# Patient Record
Sex: FEMALE | Race: WHITE | URBAN - METROPOLITAN AREA
[De-identification: names, ages, dates, MRNs, and addresses within clinical notes are randomized per-mention and may not be internally consistent; named-entity substitution may affect disease eponyms.]

---

## 2018-01-01 ENCOUNTER — OFFICE VISIT (OUTPATIENT)
Dept: ONCOLOGY | Facility: CLINIC | Age: 63
End: 2018-01-01
Attending: SURGERY
Payer: COMMERCIAL

## 2018-01-01 ENCOUNTER — APPOINTMENT (OUTPATIENT)
Dept: LAB | Facility: CLINIC | Age: 63
End: 2018-01-01
Attending: INTERNAL MEDICINE
Payer: COMMERCIAL

## 2018-01-01 ENCOUNTER — MYC MEDICAL ADVICE (OUTPATIENT)
Dept: ONCOLOGY | Facility: CLINIC | Age: 63
End: 2018-01-01

## 2018-01-01 ENCOUNTER — INFUSION THERAPY VISIT (OUTPATIENT)
Dept: ONCOLOGY | Facility: CLINIC | Age: 63
End: 2018-01-01
Attending: INTERNAL MEDICINE
Payer: COMMERCIAL

## 2018-01-01 ENCOUNTER — ONCOLOGY VISIT (OUTPATIENT)
Dept: ONCOLOGY | Facility: CLINIC | Age: 63
End: 2018-01-01
Attending: SURGERY
Payer: COMMERCIAL

## 2018-01-01 ENCOUNTER — DOCUMENTATION ONLY (OUTPATIENT)
Dept: GASTROENTEROLOGY | Facility: OUTPATIENT CENTER | Age: 63
End: 2018-01-01
Payer: COMMERCIAL

## 2018-01-01 ENCOUNTER — RADIANT APPOINTMENT (OUTPATIENT)
Dept: CT IMAGING | Facility: CLINIC | Age: 63
End: 2018-01-01
Attending: INTERNAL MEDICINE
Payer: COMMERCIAL

## 2018-01-01 ENCOUNTER — TELEPHONE (OUTPATIENT)
Dept: GASTROENTEROLOGY | Facility: OUTPATIENT CENTER | Age: 63
End: 2018-01-01

## 2018-01-01 ENCOUNTER — HOSPITAL ENCOUNTER (OUTPATIENT)
Dept: MRI IMAGING | Facility: CLINIC | Age: 63
Discharge: HOME | End: 2018-04-26
Attending: INTERNAL MEDICINE | Admitting: INTERNAL MEDICINE
Payer: COMMERCIAL

## 2018-01-01 ENCOUNTER — TELEPHONE (OUTPATIENT)
Dept: SURGERY | Facility: CLINIC | Age: 63
End: 2018-01-01

## 2018-01-01 ENCOUNTER — TELEPHONE (OUTPATIENT)
Dept: ONCOLOGY | Facility: CLINIC | Age: 63
End: 2018-01-01

## 2018-01-01 ENCOUNTER — ONCOLOGY VISIT (OUTPATIENT)
Dept: ONCOLOGY | Facility: CLINIC | Age: 63
End: 2018-01-01
Attending: PHYSICIAN ASSISTANT
Payer: COMMERCIAL

## 2018-01-01 ENCOUNTER — TRANSFERRED RECORDS (OUTPATIENT)
Dept: HEALTH INFORMATION MANAGEMENT | Facility: CLINIC | Age: 63
End: 2018-01-01

## 2018-01-01 ENCOUNTER — DOCUMENTATION ONLY (OUTPATIENT)
Dept: ONCOLOGY | Facility: CLINIC | Age: 63
End: 2018-01-01

## 2018-01-01 ENCOUNTER — DOCUMENTATION ONLY (OUTPATIENT)
Dept: OTHER | Facility: CLINIC | Age: 63
End: 2018-01-01

## 2018-01-01 ENCOUNTER — ANESTHESIA (OUTPATIENT)
Dept: SURGERY | Facility: AMBULATORY SURGERY CENTER | Age: 63
End: 2018-01-01

## 2018-01-01 ENCOUNTER — ANESTHESIA EVENT (OUTPATIENT)
Dept: SURGERY | Facility: AMBULATORY SURGERY CENTER | Age: 63
End: 2018-01-01

## 2018-01-01 ENCOUNTER — MYC MEDICAL ADVICE (OUTPATIENT)
Dept: SURGERY | Facility: CLINIC | Age: 63
End: 2018-01-01

## 2018-01-01 ENCOUNTER — SURGERY (OUTPATIENT)
Age: 63
End: 2018-01-01

## 2018-01-01 ENCOUNTER — HOSPITAL ENCOUNTER (OUTPATIENT)
Dept: PET IMAGING | Facility: CLINIC | Age: 63
Discharge: HOME | End: 2018-06-14
Attending: INTERNAL MEDICINE | Admitting: INTERNAL MEDICINE
Payer: COMMERCIAL

## 2018-01-01 ENCOUNTER — HOSPITAL ENCOUNTER (OUTPATIENT)
Facility: AMBULATORY SURGERY CENTER | Age: 63
End: 2018-05-02
Attending: SURGERY
Payer: COMMERCIAL

## 2018-01-01 ENCOUNTER — TELEPHONE (OUTPATIENT)
Dept: GASTROENTEROLOGY | Facility: CLINIC | Age: 63
End: 2018-01-01

## 2018-01-01 ENCOUNTER — HOSPITAL ENCOUNTER (OUTPATIENT)
Dept: NUCLEAR MEDICINE | Facility: CLINIC | Age: 63
Setting detail: NUCLEAR MEDICINE
Discharge: HOME | End: 2018-05-02
Attending: SURGERY | Admitting: SURGERY
Payer: COMMERCIAL

## 2018-01-01 ENCOUNTER — HEALTH MAINTENANCE LETTER (OUTPATIENT)
Age: 63
End: 2018-01-01

## 2018-01-01 ENCOUNTER — RADIANT APPOINTMENT (OUTPATIENT)
Dept: GENERAL RADIOLOGY | Facility: CLINIC | Age: 63
End: 2018-01-01
Attending: PHYSICIAN ASSISTANT
Payer: COMMERCIAL

## 2018-01-01 VITALS
DIASTOLIC BLOOD PRESSURE: 79 MMHG | HEART RATE: 68 BPM | WEIGHT: 202.2 LBS | RESPIRATION RATE: 16 BRPM | OXYGEN SATURATION: 97 % | SYSTOLIC BLOOD PRESSURE: 147 MMHG | TEMPERATURE: 97.9 F | BODY MASS INDEX: 32.64 KG/M2

## 2018-01-01 VITALS
TEMPERATURE: 99.1 F | HEART RATE: 86 BPM | SYSTOLIC BLOOD PRESSURE: 135 MMHG | WEIGHT: 205.6 LBS | OXYGEN SATURATION: 94 % | RESPIRATION RATE: 20 BRPM | BODY MASS INDEX: 33.04 KG/M2 | DIASTOLIC BLOOD PRESSURE: 85 MMHG | HEIGHT: 66 IN

## 2018-01-01 VITALS
TEMPERATURE: 98.8 F | HEART RATE: 79 BPM | OXYGEN SATURATION: 99 % | DIASTOLIC BLOOD PRESSURE: 90 MMHG | SYSTOLIC BLOOD PRESSURE: 140 MMHG | WEIGHT: 205.4 LBS | BODY MASS INDEX: 33.15 KG/M2 | RESPIRATION RATE: 16 BRPM

## 2018-01-01 VITALS
BODY MASS INDEX: 33.75 KG/M2 | HEIGHT: 66 IN | OXYGEN SATURATION: 98 % | HEART RATE: 65 BPM | SYSTOLIC BLOOD PRESSURE: 131 MMHG | WEIGHT: 210 LBS | TEMPERATURE: 97.4 F | DIASTOLIC BLOOD PRESSURE: 83 MMHG | RESPIRATION RATE: 14 BRPM

## 2018-01-01 VITALS
HEART RATE: 93 BPM | DIASTOLIC BLOOD PRESSURE: 72 MMHG | BODY MASS INDEX: 32.59 KG/M2 | SYSTOLIC BLOOD PRESSURE: 127 MMHG | RESPIRATION RATE: 16 BRPM | WEIGHT: 201.94 LBS | TEMPERATURE: 98.5 F | OXYGEN SATURATION: 93 %

## 2018-01-01 VITALS
BODY MASS INDEX: 32.6 KG/M2 | HEART RATE: 89 BPM | SYSTOLIC BLOOD PRESSURE: 127 MMHG | OXYGEN SATURATION: 93 % | WEIGHT: 202 LBS | DIASTOLIC BLOOD PRESSURE: 72 MMHG | TEMPERATURE: 98.5 F | RESPIRATION RATE: 16 BRPM

## 2018-01-01 VITALS
TEMPERATURE: 97.6 F | BODY MASS INDEX: 31.95 KG/M2 | OXYGEN SATURATION: 95 % | SYSTOLIC BLOOD PRESSURE: 140 MMHG | RESPIRATION RATE: 16 BRPM | WEIGHT: 198.8 LBS | DIASTOLIC BLOOD PRESSURE: 84 MMHG | HEART RATE: 74 BPM | HEIGHT: 66 IN

## 2018-01-01 VITALS
HEART RATE: 71 BPM | TEMPERATURE: 98.6 F | DIASTOLIC BLOOD PRESSURE: 83 MMHG | OXYGEN SATURATION: 97 % | BODY MASS INDEX: 33.04 KG/M2 | WEIGHT: 204.7 LBS | RESPIRATION RATE: 18 BRPM | SYSTOLIC BLOOD PRESSURE: 148 MMHG

## 2018-01-01 VITALS
TEMPERATURE: 99.4 F | WEIGHT: 207 LBS | RESPIRATION RATE: 18 BRPM | SYSTOLIC BLOOD PRESSURE: 135 MMHG | BODY MASS INDEX: 33.27 KG/M2 | OXYGEN SATURATION: 97 % | HEART RATE: 87 BPM | DIASTOLIC BLOOD PRESSURE: 85 MMHG | HEIGHT: 66 IN

## 2018-01-01 VITALS
OXYGEN SATURATION: 99 % | BODY MASS INDEX: 33.49 KG/M2 | TEMPERATURE: 98 F | HEART RATE: 74 BPM | RESPIRATION RATE: 18 BRPM | WEIGHT: 207.5 LBS | DIASTOLIC BLOOD PRESSURE: 94 MMHG | SYSTOLIC BLOOD PRESSURE: 161 MMHG

## 2018-01-01 VITALS
DIASTOLIC BLOOD PRESSURE: 93 MMHG | RESPIRATION RATE: 16 BRPM | OXYGEN SATURATION: 99 % | SYSTOLIC BLOOD PRESSURE: 143 MMHG | TEMPERATURE: 97.9 F | WEIGHT: 210.6 LBS | BODY MASS INDEX: 33.99 KG/M2 | HEART RATE: 65 BPM

## 2018-01-01 VITALS
TEMPERATURE: 99.8 F | BODY MASS INDEX: 33.43 KG/M2 | RESPIRATION RATE: 16 BRPM | SYSTOLIC BLOOD PRESSURE: 141 MMHG | OXYGEN SATURATION: 96 % | HEART RATE: 97 BPM | WEIGHT: 207 LBS | DIASTOLIC BLOOD PRESSURE: 91 MMHG

## 2018-01-01 VITALS
WEIGHT: 212.56 LBS | OXYGEN SATURATION: 96 % | TEMPERATURE: 98.8 F | BODY MASS INDEX: 34.16 KG/M2 | HEART RATE: 83 BPM | SYSTOLIC BLOOD PRESSURE: 140 MMHG | DIASTOLIC BLOOD PRESSURE: 81 MMHG | HEIGHT: 66 IN | RESPIRATION RATE: 16 BRPM

## 2018-01-01 VITALS
OXYGEN SATURATION: 96 % | SYSTOLIC BLOOD PRESSURE: 140 MMHG | TEMPERATURE: 98.4 F | WEIGHT: 201.4 LBS | BODY MASS INDEX: 32.51 KG/M2 | DIASTOLIC BLOOD PRESSURE: 86 MMHG | HEART RATE: 70 BPM

## 2018-01-01 VITALS
TEMPERATURE: 98.4 F | OXYGEN SATURATION: 95 % | SYSTOLIC BLOOD PRESSURE: 130 MMHG | BODY MASS INDEX: 33.2 KG/M2 | WEIGHT: 205.7 LBS | HEART RATE: 84 BPM | RESPIRATION RATE: 18 BRPM | DIASTOLIC BLOOD PRESSURE: 80 MMHG

## 2018-01-01 DIAGNOSIS — C43.9 METASTATIC MALIGNANT MELANOMA (H): ICD-10-CM

## 2018-01-01 DIAGNOSIS — C21.0 MELANOMA OF ANUS (H): Primary | ICD-10-CM

## 2018-01-01 DIAGNOSIS — F41.9 ANXIETY: ICD-10-CM

## 2018-01-01 DIAGNOSIS — E87.1 HYPONATREMIA: ICD-10-CM

## 2018-01-01 DIAGNOSIS — C21.0 MELANOMA OF ANUS (H): ICD-10-CM

## 2018-01-01 DIAGNOSIS — E05.90 HYPERTHYROIDISM: Primary | ICD-10-CM

## 2018-01-01 DIAGNOSIS — Z12.11 SPECIAL SCREENING FOR MALIGNANT NEOPLASMS, COLON: ICD-10-CM

## 2018-01-01 DIAGNOSIS — R53.83 FATIGUE, UNSPECIFIED TYPE: ICD-10-CM

## 2018-01-01 DIAGNOSIS — R05.9 COUGH: ICD-10-CM

## 2018-01-01 DIAGNOSIS — Z12.31 VISIT FOR SCREENING MAMMOGRAM: ICD-10-CM

## 2018-01-01 DIAGNOSIS — F41.9 ANXIETY: Primary | ICD-10-CM

## 2018-01-01 DIAGNOSIS — E03.2 DRUG-INDUCED HYPOTHYROIDISM: ICD-10-CM

## 2018-01-01 DIAGNOSIS — C21.0 MALIGNANT MELANOMA OF ANUS (H): ICD-10-CM

## 2018-01-01 DIAGNOSIS — C43.51 MALIGNANT MELANOMA OF ANAL SKIN (H): Primary | ICD-10-CM

## 2018-01-01 DIAGNOSIS — R05.3 PERSISTENT COUGH: ICD-10-CM

## 2018-01-01 DIAGNOSIS — C43.9 METASTATIC MELANOMA (H): ICD-10-CM

## 2018-01-01 DIAGNOSIS — C43.9 METASTATIC MALIGNANT MELANOMA (H): Primary | ICD-10-CM

## 2018-01-01 DIAGNOSIS — E03.2 DRUG-INDUCED HYPOTHYROIDISM: Primary | ICD-10-CM

## 2018-01-01 DIAGNOSIS — C43.9 METASTATIC MELANOMA (H): Primary | ICD-10-CM

## 2018-01-01 LAB
ACTH PLAS-MCNC: 15 PG/ML
ACTH PLAS-MCNC: 16 PG/ML
ALBUMIN SERPL-MCNC: 3.3 G/DL (ref 3.4–5)
ALBUMIN SERPL-MCNC: 3.3 G/DL (ref 3.4–5)
ALBUMIN SERPL-MCNC: 3.5 G/DL (ref 3.4–5)
ALBUMIN SERPL-MCNC: 3.6 G/DL (ref 3.4–5)
ALBUMIN SERPL-MCNC: 3.7 G/DL (ref 3.4–5)
ALBUMIN SERPL-MCNC: 3.7 G/DL (ref 3.4–5)
ALP SERPL-CCNC: 100 U/L (ref 40–150)
ALP SERPL-CCNC: 104 U/L (ref 40–150)
ALP SERPL-CCNC: 107 U/L (ref 40–150)
ALP SERPL-CCNC: 107 U/L (ref 40–150)
ALP SERPL-CCNC: 108 U/L (ref 40–150)
ALP SERPL-CCNC: 108 U/L (ref 40–150)
ALP SERPL-CCNC: 112 U/L (ref 40–150)
ALP SERPL-CCNC: 116 U/L (ref 40–150)
ALP SERPL-CCNC: 117 U/L (ref 40–150)
ALP SERPL-CCNC: 124 U/L (ref 40–150)
ALP SERPL-CCNC: 87 U/L (ref 40–150)
ALP SERPL-CCNC: 92 U/L (ref 40–150)
ALP SERPL-CCNC: 94 U/L (ref 40–150)
ALT SERPL W P-5'-P-CCNC: 17 U/L (ref 0–50)
ALT SERPL W P-5'-P-CCNC: 20 U/L (ref 0–50)
ALT SERPL W P-5'-P-CCNC: 21 U/L (ref 0–50)
ALT SERPL W P-5'-P-CCNC: 21 U/L (ref 0–50)
ALT SERPL W P-5'-P-CCNC: 22 U/L (ref 0–50)
ALT SERPL W P-5'-P-CCNC: 23 U/L (ref 0–50)
ALT SERPL W P-5'-P-CCNC: 23 U/L (ref 0–50)
ALT SERPL W P-5'-P-CCNC: 24 U/L (ref 0–50)
ALT SERPL W P-5'-P-CCNC: 24 U/L (ref 0–50)
ALT SERPL W P-5'-P-CCNC: 27 U/L (ref 0–50)
ALT SERPL W P-5'-P-CCNC: 29 U/L (ref 0–50)
ANION GAP SERPL CALCULATED.3IONS-SCNC: 10 MMOL/L (ref 3–14)
ANION GAP SERPL CALCULATED.3IONS-SCNC: 5 MMOL/L (ref 3–14)
ANION GAP SERPL CALCULATED.3IONS-SCNC: 6 MMOL/L (ref 3–14)
ANION GAP SERPL CALCULATED.3IONS-SCNC: 7 MMOL/L (ref 3–14)
ANION GAP SERPL CALCULATED.3IONS-SCNC: 7 MMOL/L (ref 3–14)
ANION GAP SERPL CALCULATED.3IONS-SCNC: 8 MMOL/L (ref 3–14)
ANION GAP SERPL CALCULATED.3IONS-SCNC: 9 MMOL/L (ref 3–14)
ANION GAP SERPL CALCULATED.3IONS-SCNC: 9 MMOL/L (ref 3–14)
AST SERPL W P-5'-P-CCNC: 11 U/L (ref 0–45)
AST SERPL W P-5'-P-CCNC: 13 U/L (ref 0–45)
AST SERPL W P-5'-P-CCNC: 13 U/L (ref 0–45)
AST SERPL W P-5'-P-CCNC: 14 U/L (ref 0–45)
AST SERPL W P-5'-P-CCNC: 15 U/L (ref 0–45)
AST SERPL W P-5'-P-CCNC: 15 U/L (ref 0–45)
AST SERPL W P-5'-P-CCNC: 16 U/L (ref 0–45)
AST SERPL W P-5'-P-CCNC: 16 U/L (ref 0–45)
AST SERPL W P-5'-P-CCNC: 18 U/L (ref 0–45)
AST SERPL W P-5'-P-CCNC: 19 U/L (ref 0–45)
AST SERPL W P-5'-P-CCNC: ABNORMAL U/L (ref 0–45)
BASOPHILS # BLD AUTO: 0 10E9/L (ref 0–0.2)
BASOPHILS # BLD AUTO: 0 10E9/L (ref 0–0.2)
BASOPHILS # BLD AUTO: 0.1 10E9/L (ref 0–0.2)
BASOPHILS NFR BLD AUTO: 0.6 %
BASOPHILS NFR BLD AUTO: 0.7 %
BASOPHILS NFR BLD AUTO: 0.7 %
BASOPHILS NFR BLD AUTO: 0.8 %
BASOPHILS NFR BLD AUTO: 0.9 %
BASOPHILS NFR BLD AUTO: 1 %
BILIRUB SERPL-MCNC: 0.3 MG/DL (ref 0.2–1.3)
BILIRUB SERPL-MCNC: 0.3 MG/DL (ref 0.2–1.3)
BILIRUB SERPL-MCNC: 0.4 MG/DL (ref 0.2–1.3)
BILIRUB SERPL-MCNC: 0.5 MG/DL (ref 0.2–1.3)
BILIRUB SERPL-MCNC: 0.5 MG/DL (ref 0.2–1.3)
BUN SERPL-MCNC: 18 MG/DL (ref 7–30)
BUN SERPL-MCNC: 18 MG/DL (ref 7–30)
BUN SERPL-MCNC: 19 MG/DL (ref 7–30)
BUN SERPL-MCNC: 20 MG/DL (ref 7–30)
BUN SERPL-MCNC: 20 MG/DL (ref 7–30)
BUN SERPL-MCNC: 21 MG/DL (ref 7–30)
BUN SERPL-MCNC: 22 MG/DL (ref 7–30)
BUN SERPL-MCNC: 23 MG/DL (ref 7–30)
CALCIUM SERPL-MCNC: 8.2 MG/DL (ref 8.5–10.1)
CALCIUM SERPL-MCNC: 8.3 MG/DL (ref 8.5–10.1)
CALCIUM SERPL-MCNC: 8.4 MG/DL (ref 8.5–10.1)
CALCIUM SERPL-MCNC: 8.5 MG/DL (ref 8.5–10.1)
CALCIUM SERPL-MCNC: 8.6 MG/DL (ref 8.5–10.1)
CALCIUM SERPL-MCNC: 8.6 MG/DL (ref 8.5–10.1)
CALCIUM SERPL-MCNC: 8.7 MG/DL (ref 8.5–10.1)
CALCIUM SERPL-MCNC: 8.7 MG/DL (ref 8.5–10.1)
CALCIUM SERPL-MCNC: 8.8 MG/DL (ref 8.5–10.1)
CALCIUM SERPL-MCNC: 8.8 MG/DL (ref 8.5–10.1)
CALCIUM SERPL-MCNC: 9 MG/DL (ref 8.5–10.1)
CALCIUM SERPL-MCNC: 9 MG/DL (ref 8.5–10.1)
CALCIUM SERPL-MCNC: 9.2 MG/DL (ref 8.5–10.1)
CHLORIDE SERPL-SCNC: 102 MMOL/L (ref 94–109)
CHLORIDE SERPL-SCNC: 103 MMOL/L (ref 94–109)
CHLORIDE SERPL-SCNC: 105 MMOL/L (ref 94–109)
CHLORIDE SERPL-SCNC: 107 MMOL/L (ref 94–109)
CHLORIDE SERPL-SCNC: 107 MMOL/L (ref 94–109)
CHLORIDE SERPL-SCNC: 108 MMOL/L (ref 94–109)
CHLORIDE SERPL-SCNC: 109 MMOL/L (ref 94–109)
CHLORIDE SERPL-SCNC: 110 MMOL/L (ref 94–109)
CHLORIDE SERPL-SCNC: 110 MMOL/L (ref 94–109)
CO2 SERPL-SCNC: 21 MMOL/L (ref 20–32)
CO2 SERPL-SCNC: 21 MMOL/L (ref 20–32)
CO2 SERPL-SCNC: 22 MMOL/L (ref 20–32)
CO2 SERPL-SCNC: 22 MMOL/L (ref 20–32)
CO2 SERPL-SCNC: 23 MMOL/L (ref 20–32)
CO2 SERPL-SCNC: 23 MMOL/L (ref 20–32)
CO2 SERPL-SCNC: 24 MMOL/L (ref 20–32)
CO2 SERPL-SCNC: 25 MMOL/L (ref 20–32)
CO2 SERPL-SCNC: 25 MMOL/L (ref 20–32)
CO2 SERPL-SCNC: 26 MMOL/L (ref 20–32)
CO2 SERPL-SCNC: 26 MMOL/L (ref 20–32)
COPATH REPORT: NORMAL
CORTIS SERPL-MCNC: 11.1 UG/DL (ref 4–22)
CREAT SERPL-MCNC: 0.68 MG/DL (ref 0.52–1.04)
CREAT SERPL-MCNC: 0.72 MG/DL (ref 0.52–1.04)
CREAT SERPL-MCNC: 0.73 MG/DL (ref 0.52–1.04)
CREAT SERPL-MCNC: 0.74 MG/DL (ref 0.52–1.04)
CREAT SERPL-MCNC: 0.75 MG/DL (ref 0.52–1.04)
CREAT SERPL-MCNC: 0.79 MG/DL (ref 0.52–1.04)
CREAT SERPL-MCNC: 0.84 MG/DL (ref 0.52–1.04)
CREAT SERPL-MCNC: 0.86 MG/DL (ref 0.52–1.04)
CREAT SERPL-MCNC: 0.88 MG/DL (ref 0.52–1.04)
CREAT SERPL-MCNC: 0.92 MG/DL (ref 0.52–1.04)
CREAT SERPL-MCNC: 0.94 MG/DL (ref 0.52–1.04)
CREAT SERPL-MCNC: 0.98 MG/DL (ref 0.52–1.04)
CREAT SERPL-MCNC: 1.01 MG/DL (ref 0.52–1.04)
DIFFERENTIAL METHOD BLD: ABNORMAL
DIFFERENTIAL METHOD BLD: ABNORMAL
DIFFERENTIAL METHOD BLD: NORMAL
EOSINOPHIL # BLD AUTO: 0.2 10E9/L (ref 0–0.7)
EOSINOPHIL # BLD AUTO: 0.3 10E9/L (ref 0–0.7)
EOSINOPHIL # BLD AUTO: 0.4 10E9/L (ref 0–0.7)
EOSINOPHIL # BLD AUTO: 0.5 10E9/L (ref 0–0.7)
EOSINOPHIL NFR BLD AUTO: 3.2 %
EOSINOPHIL NFR BLD AUTO: 3.2 %
EOSINOPHIL NFR BLD AUTO: 5.1 %
EOSINOPHIL NFR BLD AUTO: 5.3 %
EOSINOPHIL NFR BLD AUTO: 5.3 %
EOSINOPHIL NFR BLD AUTO: 5.4 %
EOSINOPHIL NFR BLD AUTO: 5.7 %
EOSINOPHIL NFR BLD AUTO: 6 %
EOSINOPHIL NFR BLD AUTO: 6.8 %
EOSINOPHIL NFR BLD AUTO: 7 %
EOSINOPHIL NFR BLD AUTO: 8.8 %
ERYTHROCYTE [DISTWIDTH] IN BLOOD BY AUTOMATED COUNT: 12.9 % (ref 10–15)
ERYTHROCYTE [DISTWIDTH] IN BLOOD BY AUTOMATED COUNT: 13 % (ref 10–15)
ERYTHROCYTE [DISTWIDTH] IN BLOOD BY AUTOMATED COUNT: 13.1 % (ref 10–15)
ERYTHROCYTE [DISTWIDTH] IN BLOOD BY AUTOMATED COUNT: 13.2 % (ref 10–15)
ERYTHROCYTE [DISTWIDTH] IN BLOOD BY AUTOMATED COUNT: 13.3 % (ref 10–15)
ERYTHROCYTE [DISTWIDTH] IN BLOOD BY AUTOMATED COUNT: 13.6 % (ref 10–15)
ERYTHROCYTE [DISTWIDTH] IN BLOOD BY AUTOMATED COUNT: 13.7 % (ref 10–15)
ERYTHROCYTE [DISTWIDTH] IN BLOOD BY AUTOMATED COUNT: 13.7 % (ref 10–15)
ERYTHROCYTE [DISTWIDTH] IN BLOOD BY AUTOMATED COUNT: 13.8 % (ref 10–15)
ERYTHROCYTE [DISTWIDTH] IN BLOOD BY AUTOMATED COUNT: 14.5 % (ref 10–15)
ERYTHROCYTE [DISTWIDTH] IN BLOOD BY AUTOMATED COUNT: 14.5 % (ref 10–15)
ERYTHROCYTE [DISTWIDTH] IN BLOOD BY AUTOMATED COUNT: NORMAL % (ref 10–15)
FLUAV H1 2009 PAND RNA SPEC QL NAA+PROBE: NEGATIVE
FLUAV H1 RNA SPEC QL NAA+PROBE: NEGATIVE
FLUAV H3 RNA SPEC QL NAA+PROBE: NEGATIVE
FLUAV RNA SPEC QL NAA+PROBE: NEGATIVE
FLUBV RNA SPEC QL NAA+PROBE: NEGATIVE
GFR SERPL CREATININE-BSD FRML MDRD: 56 ML/MIN/1.7M2
GFR SERPL CREATININE-BSD FRML MDRD: 58 ML/MIN/1.7M2
GFR SERPL CREATININE-BSD FRML MDRD: 61 ML/MIN/1.7M2
GFR SERPL CREATININE-BSD FRML MDRD: 62 ML/MIN/1.7M2
GFR SERPL CREATININE-BSD FRML MDRD: 66 ML/MIN/1.7M2
GFR SERPL CREATININE-BSD FRML MDRD: 67 ML/MIN/1.7M2
GFR SERPL CREATININE-BSD FRML MDRD: 70 ML/MIN/1.7M2
GFR SERPL CREATININE-BSD FRML MDRD: 74 ML/MIN/1.7M2
GFR SERPL CREATININE-BSD FRML MDRD: 78 ML/MIN/1.7M2
GFR SERPL CREATININE-BSD FRML MDRD: 80 ML/MIN/1.7M2
GFR SERPL CREATININE-BSD FRML MDRD: 82 ML/MIN/1.7M2
GFR SERPL CREATININE-BSD FRML MDRD: 82 ML/MIN/1.7M2
GFR SERPL CREATININE-BSD FRML MDRD: 88 ML/MIN/1.7M2
GLUCOSE BLDC GLUCOMTR-MCNC: 102 MG/DL (ref 70–99)
GLUCOSE SERPL-MCNC: 102 MG/DL (ref 70–99)
GLUCOSE SERPL-MCNC: 102 MG/DL (ref 70–99)
GLUCOSE SERPL-MCNC: 107 MG/DL (ref 70–99)
GLUCOSE SERPL-MCNC: 121 MG/DL (ref 70–99)
GLUCOSE SERPL-MCNC: 151 MG/DL (ref 70–99)
GLUCOSE SERPL-MCNC: 82 MG/DL (ref 70–99)
GLUCOSE SERPL-MCNC: 86 MG/DL (ref 70–99)
GLUCOSE SERPL-MCNC: 93 MG/DL (ref 70–99)
GLUCOSE SERPL-MCNC: 93 MG/DL (ref 70–99)
GLUCOSE SERPL-MCNC: 95 MG/DL (ref 70–99)
GLUCOSE SERPL-MCNC: 96 MG/DL (ref 70–99)
GLUCOSE SERPL-MCNC: 97 MG/DL (ref 70–99)
GLUCOSE SERPL-MCNC: 98 MG/DL (ref 70–99)
HADV DNA SPEC QL NAA+PROBE: NEGATIVE
HADV DNA SPEC QL NAA+PROBE: NEGATIVE
HCT VFR BLD AUTO: 40.3 % (ref 35–47)
HCT VFR BLD AUTO: 41.1 % (ref 35–47)
HCT VFR BLD AUTO: 41.6 % (ref 35–47)
HCT VFR BLD AUTO: 43.4 % (ref 35–47)
HCT VFR BLD AUTO: 43.7 % (ref 35–47)
HCT VFR BLD AUTO: 43.7 % (ref 35–47)
HCT VFR BLD AUTO: 44.4 % (ref 35–47)
HCT VFR BLD AUTO: 45.3 % (ref 35–47)
HCT VFR BLD AUTO: 45.6 % (ref 35–47)
HCT VFR BLD AUTO: 47.5 % (ref 35–47)
HCT VFR BLD AUTO: 48 % (ref 35–47)
HCT VFR BLD AUTO: NORMAL % (ref 35–47)
HGB BLD-MCNC: 13.2 G/DL (ref 11.7–15.7)
HGB BLD-MCNC: 13.4 G/DL (ref 11.7–15.7)
HGB BLD-MCNC: 13.6 G/DL (ref 11.7–15.7)
HGB BLD-MCNC: 14 G/DL (ref 11.7–15.7)
HGB BLD-MCNC: 14.1 G/DL (ref 11.7–15.7)
HGB BLD-MCNC: 14.3 G/DL (ref 11.7–15.7)
HGB BLD-MCNC: 14.4 G/DL (ref 11.7–15.7)
HGB BLD-MCNC: 14.5 G/DL (ref 11.7–15.7)
HGB BLD-MCNC: 15 G/DL (ref 11.7–15.7)
HGB BLD-MCNC: 15.3 G/DL (ref 11.7–15.7)
HGB BLD-MCNC: 15.5 G/DL (ref 11.7–15.7)
HGB BLD-MCNC: NORMAL G/DL (ref 11.7–15.7)
HMPV RNA SPEC QL NAA+PROBE: NEGATIVE
HPIV1 RNA SPEC QL NAA+PROBE: NEGATIVE
HPIV2 RNA SPEC QL NAA+PROBE: NEGATIVE
HPIV3 RNA SPEC QL NAA+PROBE: NEGATIVE
IMM GRANULOCYTES # BLD: 0 10E9/L (ref 0–0.4)
IMM GRANULOCYTES NFR BLD: 0.2 %
IMM GRANULOCYTES NFR BLD: 0.2 %
IMM GRANULOCYTES NFR BLD: 0.3 %
IMM GRANULOCYTES NFR BLD: 0.4 %
IMM GRANULOCYTES NFR BLD: 0.5 %
LYMPHOCYTES # BLD AUTO: 1.2 10E9/L (ref 0.8–5.3)
LYMPHOCYTES # BLD AUTO: 1.3 10E9/L (ref 0.8–5.3)
LYMPHOCYTES # BLD AUTO: 1.4 10E9/L (ref 0.8–5.3)
LYMPHOCYTES # BLD AUTO: 1.5 10E9/L (ref 0.8–5.3)
LYMPHOCYTES # BLD AUTO: 1.6 10E9/L (ref 0.8–5.3)
LYMPHOCYTES NFR BLD AUTO: 17.4 %
LYMPHOCYTES NFR BLD AUTO: 17.5 %
LYMPHOCYTES NFR BLD AUTO: 18.4 %
LYMPHOCYTES NFR BLD AUTO: 19.5 %
LYMPHOCYTES NFR BLD AUTO: 21.4 %
LYMPHOCYTES NFR BLD AUTO: 24 %
LYMPHOCYTES NFR BLD AUTO: 24.8 %
LYMPHOCYTES NFR BLD AUTO: 25.5 %
LYMPHOCYTES NFR BLD AUTO: 26.1 %
LYMPHOCYTES NFR BLD AUTO: 27 %
LYMPHOCYTES NFR BLD AUTO: 31.9 %
MCH RBC QN AUTO: 28.6 PG (ref 26.5–33)
MCH RBC QN AUTO: 28.6 PG (ref 26.5–33)
MCH RBC QN AUTO: 28.8 PG (ref 26.5–33)
MCH RBC QN AUTO: 28.9 PG (ref 26.5–33)
MCH RBC QN AUTO: 28.9 PG (ref 26.5–33)
MCH RBC QN AUTO: 29 PG (ref 26.5–33)
MCH RBC QN AUTO: 29 PG (ref 26.5–33)
MCH RBC QN AUTO: 29.4 PG (ref 26.5–33)
MCH RBC QN AUTO: 29.7 PG (ref 26.5–33)
MCH RBC QN AUTO: NORMAL PG (ref 26.5–33)
MCHC RBC AUTO-ENTMCNC: 31.9 G/DL (ref 31.5–36.5)
MCHC RBC AUTO-ENTMCNC: 32 G/DL (ref 31.5–36.5)
MCHC RBC AUTO-ENTMCNC: 32 G/DL (ref 31.5–36.5)
MCHC RBC AUTO-ENTMCNC: 32.3 G/DL (ref 31.5–36.5)
MCHC RBC AUTO-ENTMCNC: 32.4 G/DL (ref 31.5–36.5)
MCHC RBC AUTO-ENTMCNC: 32.6 G/DL (ref 31.5–36.5)
MCHC RBC AUTO-ENTMCNC: 32.6 G/DL (ref 31.5–36.5)
MCHC RBC AUTO-ENTMCNC: 32.7 G/DL (ref 31.5–36.5)
MCHC RBC AUTO-ENTMCNC: 32.8 G/DL (ref 31.5–36.5)
MCHC RBC AUTO-ENTMCNC: 32.9 G/DL (ref 31.5–36.5)
MCHC RBC AUTO-ENTMCNC: 32.9 G/DL (ref 31.5–36.5)
MCHC RBC AUTO-ENTMCNC: NORMAL G/DL (ref 31.5–36.5)
MCV RBC AUTO: 87 FL (ref 78–100)
MCV RBC AUTO: 88 FL (ref 78–100)
MCV RBC AUTO: 89 FL (ref 78–100)
MCV RBC AUTO: 90 FL (ref 78–100)
MCV RBC AUTO: 91 FL (ref 78–100)
MCV RBC AUTO: 91 FL (ref 78–100)
MCV RBC AUTO: NORMAL FL (ref 78–100)
MICROBIOLOGIST REVIEW: NORMAL
MONOCYTES # BLD AUTO: 0.3 10E9/L (ref 0–1.3)
MONOCYTES # BLD AUTO: 0.4 10E9/L (ref 0–1.3)
MONOCYTES # BLD AUTO: 0.5 10E9/L (ref 0–1.3)
MONOCYTES # BLD AUTO: 0.5 10E9/L (ref 0–1.3)
MONOCYTES NFR BLD AUTO: 3.8 %
MONOCYTES NFR BLD AUTO: 5.1 %
MONOCYTES NFR BLD AUTO: 5.7 %
MONOCYTES NFR BLD AUTO: 5.9 %
MONOCYTES NFR BLD AUTO: 5.9 %
MONOCYTES NFR BLD AUTO: 6 %
MONOCYTES NFR BLD AUTO: 6.1 %
MONOCYTES NFR BLD AUTO: 6.2 %
MONOCYTES NFR BLD AUTO: 6.2 %
MONOCYTES NFR BLD AUTO: 6.3 %
MONOCYTES NFR BLD AUTO: 6.4 %
NEUTROPHILS # BLD AUTO: 2.7 10E9/L (ref 1.6–8.3)
NEUTROPHILS # BLD AUTO: 3.3 10E9/L (ref 1.6–8.3)
NEUTROPHILS # BLD AUTO: 3.7 10E9/L (ref 1.6–8.3)
NEUTROPHILS # BLD AUTO: 3.8 10E9/L (ref 1.6–8.3)
NEUTROPHILS # BLD AUTO: 3.9 10E9/L (ref 1.6–8.3)
NEUTROPHILS # BLD AUTO: 4 10E9/L (ref 1.6–8.3)
NEUTROPHILS # BLD AUTO: 4 10E9/L (ref 1.6–8.3)
NEUTROPHILS # BLD AUTO: 5.2 10E9/L (ref 1.6–8.3)
NEUTROPHILS # BLD AUTO: 5.4 10E9/L (ref 1.6–8.3)
NEUTROPHILS # BLD AUTO: 5.5 10E9/L (ref 1.6–8.3)
NEUTROPHILS # BLD AUTO: 5.9 10E9/L (ref 1.6–8.3)
NEUTROPHILS NFR BLD AUTO: 55.4 %
NEUTROPHILS NFR BLD AUTO: 57.3 %
NEUTROPHILS NFR BLD AUTO: 60.1 %
NEUTROPHILS NFR BLD AUTO: 61.9 %
NEUTROPHILS NFR BLD AUTO: 62 %
NEUTROPHILS NFR BLD AUTO: 63 %
NEUTROPHILS NFR BLD AUTO: 65.6 %
NEUTROPHILS NFR BLD AUTO: 68.5 %
NEUTROPHILS NFR BLD AUTO: 70.2 %
NEUTROPHILS NFR BLD AUTO: 71.9 %
NEUTROPHILS NFR BLD AUTO: 72.3 %
NRBC # BLD AUTO: 0 10*3/UL
NRBC BLD AUTO-RTO: 0 /100
OSMOLALITY SERPL: 292 MMOL/KG (ref 280–301)
OSMOLALITY SERPL: 295 MMOL/KG (ref 280–301)
OSMOLALITY UR: 283 MMOL/KG (ref 100–1200)
PLATELET # BLD AUTO: 264 10E9/L (ref 150–450)
PLATELET # BLD AUTO: 272 10E9/L (ref 150–450)
PLATELET # BLD AUTO: 272 10E9/L (ref 150–450)
PLATELET # BLD AUTO: 283 10E9/L (ref 150–450)
PLATELET # BLD AUTO: 288 10E9/L (ref 150–450)
PLATELET # BLD AUTO: 288 10E9/L (ref 150–450)
PLATELET # BLD AUTO: 295 10E9/L (ref 150–450)
PLATELET # BLD AUTO: 299 10E9/L (ref 150–450)
PLATELET # BLD AUTO: 299 10E9/L (ref 150–450)
PLATELET # BLD AUTO: 304 10E9/L (ref 150–450)
PLATELET # BLD AUTO: 314 10E9/L (ref 150–450)
PLATELET # BLD AUTO: NORMAL 10E9/L (ref 150–450)
POTASSIUM SERPL-SCNC: 3.8 MMOL/L (ref 3.4–5.3)
POTASSIUM SERPL-SCNC: 3.8 MMOL/L (ref 3.4–5.3)
POTASSIUM SERPL-SCNC: 3.9 MMOL/L (ref 3.4–5.3)
POTASSIUM SERPL-SCNC: 3.9 MMOL/L (ref 3.4–5.3)
POTASSIUM SERPL-SCNC: 4 MMOL/L (ref 3.4–5.3)
POTASSIUM SERPL-SCNC: 4.2 MMOL/L (ref 3.4–5.3)
POTASSIUM SERPL-SCNC: 4.6 MMOL/L (ref 3.4–5.3)
POTASSIUM SERPL-SCNC: 5.8 MMOL/L (ref 3.4–5.3)
PROT SERPL-MCNC: 6.9 G/DL (ref 6.8–8.8)
PROT SERPL-MCNC: 7.1 G/DL (ref 6.8–8.8)
PROT SERPL-MCNC: 7.3 G/DL (ref 6.8–8.8)
PROT SERPL-MCNC: 7.3 G/DL (ref 6.8–8.8)
PROT SERPL-MCNC: 7.4 G/DL (ref 6.8–8.8)
PROT SERPL-MCNC: 7.4 G/DL (ref 6.8–8.8)
PROT SERPL-MCNC: 7.5 G/DL (ref 6.8–8.8)
PROT SERPL-MCNC: 7.5 G/DL (ref 6.8–8.8)
PROT SERPL-MCNC: 7.7 G/DL (ref 6.8–8.8)
RBC # BLD AUTO: 4.44 10E12/L (ref 3.8–5.2)
RBC # BLD AUTO: 4.63 10E12/L (ref 3.8–5.2)
RBC # BLD AUTO: 4.66 10E12/L (ref 3.8–5.2)
RBC # BLD AUTO: 4.84 10E12/L (ref 3.8–5.2)
RBC # BLD AUTO: 4.86 10E12/L (ref 3.8–5.2)
RBC # BLD AUTO: 4.97 10E12/L (ref 3.8–5.2)
RBC # BLD AUTO: 5 10E12/L (ref 3.8–5.2)
RBC # BLD AUTO: 5 10E12/L (ref 3.8–5.2)
RBC # BLD AUTO: 5.19 10E12/L (ref 3.8–5.2)
RBC # BLD AUTO: 5.35 10E12/L (ref 3.8–5.2)
RBC # BLD AUTO: 5.42 10E12/L (ref 3.8–5.2)
RBC # BLD AUTO: NORMAL 10E12/L (ref 3.8–5.2)
RHINOVIRUS RNA SPEC QL NAA+PROBE: NEGATIVE
RSV RNA SPEC QL NAA+PROBE: NEGATIVE
RSV RNA SPEC QL NAA+PROBE: NEGATIVE
SODIUM SERPL-SCNC: 132 MMOL/L (ref 133–144)
SODIUM SERPL-SCNC: 135 MMOL/L (ref 133–144)
SODIUM SERPL-SCNC: 135 MMOL/L (ref 133–144)
SODIUM SERPL-SCNC: 136 MMOL/L (ref 133–144)
SODIUM SERPL-SCNC: 138 MMOL/L (ref 133–144)
SODIUM SERPL-SCNC: 138 MMOL/L (ref 133–144)
SODIUM SERPL-SCNC: 139 MMOL/L (ref 133–144)
SODIUM SERPL-SCNC: 140 MMOL/L (ref 133–144)
SODIUM SERPL-SCNC: 141 MMOL/L (ref 133–144)
SODIUM SERPL-SCNC: 141 MMOL/L (ref 133–144)
SODIUM SERPL-SCNC: 142 MMOL/L (ref 133–144)
SODIUM UR-SCNC: 61 MMOL/L
SPECIMEN SOURCE: NORMAL
T4 FREE SERPL-MCNC: 0.24 NG/DL (ref 0.76–1.46)
T4 FREE SERPL-MCNC: 0.42 NG/DL (ref 0.76–1.46)
T4 FREE SERPL-MCNC: 0.96 NG/DL (ref 0.76–1.46)
T4 FREE SERPL-MCNC: 1 NG/DL (ref 0.76–1.46)
T4 FREE SERPL-MCNC: 1.08 NG/DL (ref 0.76–1.46)
T4 FREE SERPL-MCNC: 1.09 NG/DL (ref 0.76–1.46)
T4 FREE SERPL-MCNC: 1.8 NG/DL (ref 0.76–1.46)
T4 FREE SERPL-MCNC: 2.18 NG/DL (ref 0.76–1.46)
TSH SERPL DL<=0.005 MIU/L-ACNC: 0.01 MU/L (ref 0.4–4)
TSH SERPL DL<=0.005 MIU/L-ACNC: 0.4 MU/L (ref 0.4–4)
TSH SERPL DL<=0.005 MIU/L-ACNC: 105.94 MU/L (ref 0.4–4)
TSH SERPL DL<=0.005 MIU/L-ACNC: 2.39 MU/L (ref 0.4–4)
TSH SERPL DL<=0.005 MIU/L-ACNC: 23.6 MU/L (ref 0.4–4)
TSH SERPL DL<=0.005 MIU/L-ACNC: 24.67 MU/L (ref 0.4–4)
TSH SERPL DL<=0.005 MIU/L-ACNC: 32 MU/L (ref 0.4–4)
TSH SERPL DL<=0.005 MIU/L-ACNC: 34.49 MU/L (ref 0.4–4)
TSH SERPL DL<=0.005 MIU/L-ACNC: 62.86 MU/L (ref 0.4–4)
TSH SERPL DL<=0.005 MIU/L-ACNC: <0.01 MU/L (ref 0.4–4)
WBC # BLD AUTO: 4.9 10E9/L (ref 4–11)
WBC # BLD AUTO: 5.8 10E9/L (ref 4–11)
WBC # BLD AUTO: 5.9 10E9/L (ref 4–11)
WBC # BLD AUTO: 6 10E9/L (ref 4–11)
WBC # BLD AUTO: 6.3 10E9/L (ref 4–11)
WBC # BLD AUTO: 6.3 10E9/L (ref 4–11)
WBC # BLD AUTO: 6.4 10E9/L (ref 4–11)
WBC # BLD AUTO: 7.1 10E9/L (ref 4–11)
WBC # BLD AUTO: 7.7 10E9/L (ref 4–11)
WBC # BLD AUTO: 7.7 10E9/L (ref 4–11)
WBC # BLD AUTO: 8.5 10E9/L (ref 4–11)
WBC # BLD AUTO: NORMAL 10E9/L (ref 4–11)

## 2018-01-01 PROCEDURE — 99214 OFFICE O/P EST MOD 30 MIN: CPT | Mod: ZP | Performed by: INTERNAL MEDICINE

## 2018-01-01 PROCEDURE — 80053 COMPREHEN METABOLIC PANEL: CPT | Performed by: INTERNAL MEDICINE

## 2018-01-01 PROCEDURE — 25000128 H RX IP 250 OP 636: Mod: ZF | Performed by: INTERNAL MEDICINE

## 2018-01-01 PROCEDURE — 82962 GLUCOSE BLOOD TEST: CPT

## 2018-01-01 PROCEDURE — 84439 ASSAY OF FREE THYROXINE: CPT | Performed by: PHYSICIAN ASSISTANT

## 2018-01-01 PROCEDURE — 85025 COMPLETE CBC W/AUTO DIFF WBC: CPT | Performed by: INTERNAL MEDICINE

## 2018-01-01 PROCEDURE — 34300033 ZZH RX 343: Performed by: INTERNAL MEDICINE

## 2018-01-01 PROCEDURE — G0463 HOSPITAL OUTPT CLINIC VISIT: HCPCS | Mod: ZF

## 2018-01-01 PROCEDURE — 90471 IMMUNIZATION ADMIN: CPT

## 2018-01-01 PROCEDURE — 84439 ASSAY OF FREE THYROXINE: CPT | Performed by: INTERNAL MEDICINE

## 2018-01-01 PROCEDURE — 96413 CHEMO IV INFUSION 1 HR: CPT

## 2018-01-01 PROCEDURE — 83930 ASSAY OF BLOOD OSMOLALITY: CPT | Performed by: PHYSICIAN ASSISTANT

## 2018-01-01 PROCEDURE — A9552 F18 FDG: HCPCS | Performed by: INTERNAL MEDICINE

## 2018-01-01 PROCEDURE — 25000128 H RX IP 250 OP 636: Performed by: INTERNAL MEDICINE

## 2018-01-01 PROCEDURE — 36415 COLL VENOUS BLD VENIPUNCTURE: CPT

## 2018-01-01 PROCEDURE — 99214 OFFICE O/P EST MOD 30 MIN: CPT | Mod: ZP | Performed by: PHYSICIAN ASSISTANT

## 2018-01-01 PROCEDURE — 84443 ASSAY THYROID STIM HORMONE: CPT | Performed by: INTERNAL MEDICINE

## 2018-01-01 PROCEDURE — 82435 ASSAY OF BLOOD CHLORIDE: CPT | Performed by: INTERNAL MEDICINE

## 2018-01-01 PROCEDURE — G0009 ADMIN PNEUMOCOCCAL VACCINE: HCPCS

## 2018-01-01 PROCEDURE — 82947 ASSAY GLUCOSE BLOOD QUANT: CPT | Mod: ZF | Performed by: INTERNAL MEDICINE

## 2018-01-01 PROCEDURE — 80053 COMPREHEN METABOLIC PANEL: CPT | Performed by: PHYSICIAN ASSISTANT

## 2018-01-01 PROCEDURE — 25000128 H RX IP 250 OP 636: Mod: ZF | Performed by: PHYSICIAN ASSISTANT

## 2018-01-01 PROCEDURE — 83935 ASSAY OF URINE OSMOLALITY: CPT | Performed by: PHYSICIAN ASSISTANT

## 2018-01-01 PROCEDURE — 78816 PET IMAGE W/CT FULL BODY: CPT | Mod: PS

## 2018-01-01 PROCEDURE — 87633 RESP VIRUS 12-25 TARGETS: CPT | Performed by: PHYSICIAN ASSISTANT

## 2018-01-01 PROCEDURE — 84300 ASSAY OF URINE SODIUM: CPT | Performed by: PHYSICIAN ASSISTANT

## 2018-01-01 PROCEDURE — 84075 ASSAY ALKALINE PHOSPHATASE: CPT | Performed by: INTERNAL MEDICINE

## 2018-01-01 PROCEDURE — 34300033 ZZH RX 343: Performed by: SURGERY

## 2018-01-01 PROCEDURE — 84520 ASSAY OF UREA NITROGEN: CPT | Performed by: INTERNAL MEDICINE

## 2018-01-01 PROCEDURE — 84443 ASSAY THYROID STIM HORMONE: CPT | Performed by: PHYSICIAN ASSISTANT

## 2018-01-01 PROCEDURE — 82533 TOTAL CORTISOL: CPT | Performed by: PHYSICIAN ASSISTANT

## 2018-01-01 PROCEDURE — 40000556 ZZH STATISTIC PERIPHERAL IV START W US GUIDANCE: Mod: ZF

## 2018-01-01 PROCEDURE — 25000125 ZZHC RX 250: Mod: ZF | Performed by: RADIOLOGY

## 2018-01-01 PROCEDURE — 84155 ASSAY OF PROTEIN SERUM: CPT | Performed by: INTERNAL MEDICINE

## 2018-01-01 PROCEDURE — 90750 HZV VACC RECOMBINANT IM: CPT | Mod: ZF | Performed by: PHYSICIAN ASSISTANT

## 2018-01-01 PROCEDURE — 82565 ASSAY OF CREATININE: CPT | Performed by: INTERNAL MEDICINE

## 2018-01-01 PROCEDURE — 84132 ASSAY OF SERUM POTASSIUM: CPT | Performed by: INTERNAL MEDICINE

## 2018-01-01 PROCEDURE — 84295 ASSAY OF SERUM SODIUM: CPT | Performed by: INTERNAL MEDICINE

## 2018-01-01 PROCEDURE — 82024 ASSAY OF ACTH: CPT | Performed by: PHYSICIAN ASSISTANT

## 2018-01-01 PROCEDURE — 70553 MRI BRAIN STEM W/O & W/DYE: CPT

## 2018-01-01 PROCEDURE — 82247 BILIRUBIN TOTAL: CPT | Performed by: INTERNAL MEDICINE

## 2018-01-01 PROCEDURE — 82310 ASSAY OF CALCIUM: CPT | Performed by: INTERNAL MEDICINE

## 2018-01-01 PROCEDURE — 82040 ASSAY OF SERUM ALBUMIN: CPT | Performed by: INTERNAL MEDICINE

## 2018-01-01 PROCEDURE — 85025 COMPLETE CBC W/AUTO DIFF WBC: CPT | Performed by: PHYSICIAN ASSISTANT

## 2018-01-01 PROCEDURE — 25000581 ZZH RX MED A9270 GY (STAT IND- M) 250: Mod: ZF | Performed by: PHYSICIAN ASSISTANT

## 2018-01-01 PROCEDURE — 78195 LYMPH SYSTEM IMAGING: CPT

## 2018-01-01 PROCEDURE — 82374 ASSAY BLOOD CARBON DIOXIDE: CPT | Performed by: INTERNAL MEDICINE

## 2018-01-01 PROCEDURE — 90670 PCV13 VACCINE IM: CPT | Mod: ZF | Performed by: INTERNAL MEDICINE

## 2018-01-01 PROCEDURE — A9585 GADOBUTROL INJECTION: HCPCS | Performed by: INTERNAL MEDICINE

## 2018-01-01 PROCEDURE — 99213 OFFICE O/P EST LOW 20 MIN: CPT | Mod: ZP | Performed by: INTERNAL MEDICINE

## 2018-01-01 PROCEDURE — 84460 ALANINE AMINO (ALT) (SGPT): CPT | Performed by: INTERNAL MEDICINE

## 2018-01-01 PROCEDURE — A9520 TC99 TILMANOCEPT DIAG 0.5MCI: HCPCS | Performed by: SURGERY

## 2018-01-01 PROCEDURE — 40000141 ZZH STATISTIC PERIPHERAL IV START W/O US GUIDANCE: Mod: ZF

## 2018-01-01 RX ORDER — LIDOCAINE HYDROCHLORIDE AND EPINEPHRINE 10; 10 MG/ML; UG/ML
INJECTION, SOLUTION INFILTRATION; PERINEURAL PRN
Status: DISCONTINUED | OUTPATIENT
Start: 2018-01-01 | End: 2018-01-01 | Stop reason: HOSPADM

## 2018-01-01 RX ORDER — METHYLPREDNISOLONE SODIUM SUCCINATE 125 MG/2ML
125 INJECTION, POWDER, LYOPHILIZED, FOR SOLUTION INTRAMUSCULAR; INTRAVENOUS
Status: CANCELLED
Start: 2018-01-01

## 2018-01-01 RX ORDER — DIPHENHYDRAMINE HYDROCHLORIDE 50 MG/ML
50 INJECTION INTRAMUSCULAR; INTRAVENOUS
Status: CANCELLED
Start: 2018-01-01

## 2018-01-01 RX ORDER — EPINEPHRINE 0.3 MG/.3ML
0.3 INJECTION SUBCUTANEOUS EVERY 5 MIN PRN
Status: CANCELLED | OUTPATIENT
Start: 2018-01-01

## 2018-01-01 RX ORDER — MEPERIDINE HYDROCHLORIDE 25 MG/ML
25 INJECTION INTRAMUSCULAR; INTRAVENOUS; SUBCUTANEOUS EVERY 30 MIN PRN
Status: CANCELLED | OUTPATIENT
Start: 2018-01-01

## 2018-01-01 RX ORDER — ONDANSETRON 2 MG/ML
INJECTION INTRAMUSCULAR; INTRAVENOUS PRN
Status: DISCONTINUED | OUTPATIENT
Start: 2018-01-01 | End: 2018-01-01

## 2018-01-01 RX ORDER — ALBUTEROL SULFATE 0.83 MG/ML
2.5 SOLUTION RESPIRATORY (INHALATION)
Status: CANCELLED | OUTPATIENT
Start: 2018-01-01

## 2018-01-01 RX ORDER — EPINEPHRINE 1 MG/ML
0.3 INJECTION, SOLUTION INTRAMUSCULAR; SUBCUTANEOUS EVERY 5 MIN PRN
Status: CANCELLED | OUTPATIENT
Start: 2018-01-01

## 2018-01-01 RX ORDER — EPINEPHRINE 1 MG/ML
0.3 INJECTION, SOLUTION, CONCENTRATE INTRAVENOUS EVERY 5 MIN PRN
Status: CANCELLED | OUTPATIENT
Start: 2018-01-01

## 2018-01-01 RX ORDER — LORAZEPAM 0.5 MG/1
0.5 TABLET ORAL EVERY 4 HOURS PRN
Qty: 30 TABLET | Refills: 3 | Status: SHIPPED | OUTPATIENT
Start: 2018-01-01 | End: 2019-01-01

## 2018-01-01 RX ORDER — ACETAMINOPHEN 325 MG/1
650 TABLET ORAL
Status: DISCONTINUED | OUTPATIENT
Start: 2018-01-01 | End: 2018-01-01 | Stop reason: HOSPADM

## 2018-01-01 RX ORDER — PROPOFOL 10 MG/ML
INJECTION, EMULSION INTRAVENOUS CONTINUOUS PRN
Status: DISCONTINUED | OUTPATIENT
Start: 2018-01-01 | End: 2018-01-01

## 2018-01-01 RX ORDER — SODIUM CHLORIDE 9 MG/ML
1000 INJECTION, SOLUTION INTRAVENOUS CONTINUOUS PRN
Status: CANCELLED
Start: 2018-01-01

## 2018-01-01 RX ORDER — IOPAMIDOL 755 MG/ML
126 INJECTION, SOLUTION INTRAVASCULAR ONCE
Status: COMPLETED | OUTPATIENT
Start: 2018-01-01 | End: 2018-01-01

## 2018-01-01 RX ORDER — GUAIFENESIN 600 MG/1
TABLET, EXTENDED RELEASE ORAL
COMMUNITY
Start: 2018-01-01 | End: 2018-01-01

## 2018-01-01 RX ORDER — LORAZEPAM 2 MG/ML
0.5 INJECTION INTRAMUSCULAR EVERY 4 HOURS PRN
Status: CANCELLED
Start: 2018-01-01

## 2018-01-01 RX ORDER — FENTANYL CITRATE 50 UG/ML
INJECTION, SOLUTION INTRAMUSCULAR; INTRAVENOUS PRN
Status: DISCONTINUED | OUTPATIENT
Start: 2018-01-01 | End: 2018-01-01

## 2018-01-01 RX ORDER — ALBUTEROL SULFATE 90 UG/1
1-2 AEROSOL, METERED RESPIRATORY (INHALATION)
Status: CANCELLED
Start: 2018-01-01

## 2018-01-01 RX ORDER — SENNOSIDES 8.6 MG
650 CAPSULE ORAL EVERY 6 HOURS PRN
COMMUNITY
End: 2019-01-01

## 2018-01-01 RX ORDER — IOPAMIDOL 755 MG/ML
123 INJECTION, SOLUTION INTRAVASCULAR ONCE
Status: COMPLETED | OUTPATIENT
Start: 2018-01-01 | End: 2018-01-01

## 2018-01-01 RX ORDER — LEVOTHYROXINE SODIUM 100 UG/1
100 TABLET ORAL DAILY
Qty: 30 TABLET | Refills: 2 | Status: SHIPPED | OUTPATIENT
Start: 2018-01-01 | End: 2018-01-01

## 2018-01-01 RX ORDER — ACETAMINOPHEN 325 MG/1
975 TABLET ORAL ONCE
Status: COMPLETED | OUTPATIENT
Start: 2018-01-01 | End: 2018-01-01

## 2018-01-01 RX ORDER — FENTANYL CITRATE 50 UG/ML
25-50 INJECTION, SOLUTION INTRAMUSCULAR; INTRAVENOUS
Status: DISCONTINUED | OUTPATIENT
Start: 2018-01-01 | End: 2018-01-01 | Stop reason: HOSPADM

## 2018-01-01 RX ORDER — NALOXONE HYDROCHLORIDE 0.4 MG/ML
.1-.4 INJECTION, SOLUTION INTRAMUSCULAR; INTRAVENOUS; SUBCUTANEOUS
Status: DISCONTINUED | OUTPATIENT
Start: 2018-01-01 | End: 2018-01-01 | Stop reason: HOSPADM

## 2018-01-01 RX ORDER — LEVOTHYROXINE SODIUM 125 UG/1
125 TABLET ORAL DAILY
Qty: 30 TABLET | Refills: 1 | Status: SHIPPED | OUTPATIENT
Start: 2018-01-01 | End: 2019-01-01

## 2018-01-01 RX ORDER — GUAIFENESIN 600 MG/1
1200 TABLET, EXTENDED RELEASE ORAL 2 TIMES DAILY PRN
COMMUNITY

## 2018-01-01 RX ORDER — INFLUENZA A VIRUS A/GUANGDONG-MAONAN/SWL1536/2019 CNIC-1909 (H1N1) ANTIGEN (FORMALDEHYDE INACTIVATED), INFLUENZA A VIRUS A/HONG KONG/2671/2019 (H3N2) ANTIGEN (FORMALDEHYDE INACTIVATED), INFLUENZA B VIRUS B/PHUKET/3073/2013 ANTIGEN (FORMALDEHYDE INACTIVATED), AND INFLUENZA B VIRUS B/WASHINGTON/02/2019 ANTIGEN (FORMALDEHYDE INACTIVATED) 15; 15; 15; 15 UG/.5ML; UG/.5ML; UG/.5ML; UG/.5ML
INJECTION, SUSPENSION INTRAMUSCULAR
Status: ON HOLD | COMMUNITY
Start: 2018-01-01 | End: 2019-01-01

## 2018-01-01 RX ORDER — ONDANSETRON 2 MG/ML
4 INJECTION INTRAMUSCULAR; INTRAVENOUS EVERY 30 MIN PRN
Status: DISCONTINUED | OUTPATIENT
Start: 2018-01-01 | End: 2018-01-01 | Stop reason: HOSPADM

## 2018-01-01 RX ORDER — KETOROLAC TROMETHAMINE 30 MG/ML
INJECTION, SOLUTION INTRAMUSCULAR; INTRAVENOUS PRN
Status: DISCONTINUED | OUTPATIENT
Start: 2018-01-01 | End: 2018-01-01

## 2018-01-01 RX ORDER — SODIUM CHLORIDE, SODIUM LACTATE, POTASSIUM CHLORIDE, CALCIUM CHLORIDE 600; 310; 30; 20 MG/100ML; MG/100ML; MG/100ML; MG/100ML
INJECTION, SOLUTION INTRAVENOUS CONTINUOUS
Status: DISCONTINUED | OUTPATIENT
Start: 2018-01-01 | End: 2018-01-01 | Stop reason: HOSPADM

## 2018-01-01 RX ORDER — LEVOTHYROXINE SODIUM 100 UG/1
100 TABLET ORAL DAILY
Qty: 30 TABLET | Refills: 0 | Status: SHIPPED | OUTPATIENT
Start: 2018-01-01 | End: 2018-01-01

## 2018-01-01 RX ORDER — LORAZEPAM 0.5 MG/1
0.5 TABLET ORAL PRN
Qty: 20 TABLET | Refills: 0 | Status: SHIPPED | OUTPATIENT
Start: 2018-01-01 | End: 2018-01-01

## 2018-01-01 RX ORDER — LORAZEPAM 0.5 MG/1
0.5 TABLET ORAL PRN
Qty: 3 TABLET | Refills: 0 | Status: SHIPPED | OUTPATIENT
Start: 2018-01-01 | End: 2018-01-01

## 2018-01-01 RX ORDER — EPHEDRINE SULFATE 50 MG/ML
INJECTION, SOLUTION INTRAMUSCULAR; INTRAVENOUS; SUBCUTANEOUS PRN
Status: DISCONTINUED | OUTPATIENT
Start: 2018-01-01 | End: 2018-01-01

## 2018-01-01 RX ORDER — BUPIVACAINE HYDROCHLORIDE 2.5 MG/ML
INJECTION, SOLUTION INFILTRATION; PERINEURAL PRN
Status: DISCONTINUED | OUTPATIENT
Start: 2018-01-01 | End: 2018-01-01 | Stop reason: HOSPADM

## 2018-01-01 RX ORDER — BENZONATATE 100 MG/1
100 CAPSULE ORAL 2 TIMES DAILY PRN
Qty: 60 CAPSULE | Refills: 3 | Status: SHIPPED | OUTPATIENT
Start: 2018-01-01 | End: 2019-01-01

## 2018-01-01 RX ORDER — ONDANSETRON 4 MG/1
4 TABLET, ORALLY DISINTEGRATING ORAL EVERY 30 MIN PRN
Status: DISCONTINUED | OUTPATIENT
Start: 2018-01-01 | End: 2018-01-01 | Stop reason: HOSPADM

## 2018-01-01 RX ORDER — LORAZEPAM 0.5 MG/1
0.5 TABLET ORAL EVERY 4 HOURS PRN
Qty: 30 TABLET | Refills: 3 | Status: SHIPPED | OUTPATIENT
Start: 2018-01-01 | End: 2018-01-01

## 2018-01-01 RX ORDER — PROPOFOL 10 MG/ML
INJECTION, EMULSION INTRAVENOUS PRN
Status: DISCONTINUED | OUTPATIENT
Start: 2018-01-01 | End: 2018-01-01

## 2018-01-01 RX ORDER — DEXAMETHASONE SODIUM PHOSPHATE 4 MG/ML
INJECTION, SOLUTION INTRA-ARTICULAR; INTRALESIONAL; INTRAMUSCULAR; INTRAVENOUS; SOFT TISSUE PRN
Status: DISCONTINUED | OUTPATIENT
Start: 2018-01-01 | End: 2018-01-01

## 2018-01-01 RX ORDER — GADOBUTROL 604.72 MG/ML
10 INJECTION INTRAVENOUS ONCE
Status: COMPLETED | OUTPATIENT
Start: 2018-01-01 | End: 2018-01-01

## 2018-01-01 RX ORDER — OXYCODONE HYDROCHLORIDE 5 MG/1
5 TABLET ORAL EVERY 6 HOURS PRN
Qty: 6 TABLET | Refills: 0 | Status: SHIPPED | OUTPATIENT
Start: 2018-01-01 | End: 2018-01-01

## 2018-01-01 RX ORDER — PROCHLORPERAZINE MALEATE 10 MG
10 TABLET ORAL EVERY 6 HOURS PRN
Qty: 30 TABLET | Refills: 3 | Status: SHIPPED | OUTPATIENT
Start: 2018-01-01 | End: 2019-01-01

## 2018-01-01 RX ORDER — OXYCODONE HYDROCHLORIDE 5 MG/1
5 TABLET ORAL
Status: DISCONTINUED | OUTPATIENT
Start: 2018-01-01 | End: 2018-01-01 | Stop reason: HOSPADM

## 2018-01-01 RX ORDER — GABAPENTIN 300 MG/1
300 CAPSULE ORAL ONCE
Status: COMPLETED | OUTPATIENT
Start: 2018-01-01 | End: 2018-01-01

## 2018-01-01 RX ORDER — LEVOTHYROXINE SODIUM 125 UG/1
125 TABLET ORAL DAILY
Qty: 30 TABLET | Refills: 0 | Status: SHIPPED | OUTPATIENT
Start: 2018-01-01 | End: 2018-01-01

## 2018-01-01 RX ORDER — LEVOTHYROXINE SODIUM 100 UG/1
100 TABLET ORAL DAILY
Qty: 14 TABLET | Refills: 0 | Status: SHIPPED | OUTPATIENT
Start: 2018-01-01 | End: 2018-01-01

## 2018-01-01 RX ORDER — ETHYL CHLORIDE 100 %
AEROSOL, SPRAY (ML) TOPICAL ONCE
Status: COMPLETED | OUTPATIENT
Start: 2018-01-01 | End: 2018-01-01

## 2018-01-01 RX ORDER — IOPAMIDOL 755 MG/ML
0-135 INJECTION, SOLUTION INTRAVASCULAR ONCE
Status: COMPLETED | OUTPATIENT
Start: 2018-01-01 | End: 2018-01-01

## 2018-01-01 RX ORDER — ISOSULFAN BLUE 50 MG/5ML
INJECTION, SOLUTION SUBCUTANEOUS PRN
Status: DISCONTINUED | OUTPATIENT
Start: 2018-01-01 | End: 2018-01-01 | Stop reason: HOSPADM

## 2018-01-01 RX ORDER — CALCIUM CARBONATE 500(1250)
TABLET ORAL
COMMUNITY
End: 2018-01-01

## 2018-01-01 RX ORDER — CEFAZOLIN SODIUM 1 G/50ML
1 INJECTION, SOLUTION INTRAVENOUS SEE ADMIN INSTRUCTIONS
Status: CANCELLED | OUTPATIENT
Start: 2018-01-01

## 2018-01-01 RX ORDER — LIDOCAINE 40 MG/G
CREAM TOPICAL
Status: DISCONTINUED | OUTPATIENT
Start: 2018-01-01 | End: 2018-01-01 | Stop reason: HOSPADM

## 2018-01-01 RX ORDER — LEVOTHYROXINE SODIUM 112 UG/1
112 TABLET ORAL DAILY
Qty: 30 TABLET | Refills: 1 | Status: SHIPPED | OUTPATIENT
Start: 2018-01-01 | End: 2018-01-01

## 2018-01-01 RX ADMIN — SODIUM CHLORIDE 480 MG: 9 INJECTION, SOLUTION INTRAVENOUS at 11:30

## 2018-01-01 RX ADMIN — EPHEDRINE SULFATE 10 MG: 50 INJECTION, SOLUTION INTRAMUSCULAR; INTRAVENOUS; SUBCUTANEOUS at 13:16

## 2018-01-01 RX ADMIN — SODIUM CHLORIDE 240 MG: 9 INJECTION, SOLUTION INTRAVENOUS at 12:47

## 2018-01-01 RX ADMIN — FENTANYL CITRATE 50 MCG: 50 INJECTION, SOLUTION INTRAMUSCULAR; INTRAVENOUS at 13:41

## 2018-01-01 RX ADMIN — KETOROLAC TROMETHAMINE 30 MG: 30 INJECTION, SOLUTION INTRAMUSCULAR; INTRAVENOUS at 13:55

## 2018-01-01 RX ADMIN — DEXAMETHASONE SODIUM PHOSPHATE 4 MG: 4 INJECTION, SOLUTION INTRA-ARTICULAR; INTRALESIONAL; INTRAMUSCULAR; INTRAVENOUS; SOFT TISSUE at 13:04

## 2018-01-01 RX ADMIN — GADOBUTROL 10 ML: 604.72 INJECTION INTRAVENOUS at 16:44

## 2018-01-01 RX ADMIN — SODIUM CHLORIDE 250 ML: 9 INJECTION, SOLUTION INTRAVENOUS at 12:15

## 2018-01-01 RX ADMIN — IOPAMIDOL 127 ML: 755 INJECTION, SOLUTION INTRAVENOUS at 09:12

## 2018-01-01 RX ADMIN — Medication 100 MCG: at 13:30

## 2018-01-01 RX ADMIN — PNEUMOCOCCAL 13-VALENT CONJUGATE VACCINE 0.5 ML: 2.2; 2.2; 2.2; 2.2; 2.2; 4.4; 2.2; 2.2; 2.2; 2.2; 2.2; 2.2; 2.2 INJECTION, SUSPENSION INTRAMUSCULAR at 15:25

## 2018-01-01 RX ADMIN — IOPAMIDOL 123 ML: 755 INJECTION, SOLUTION INTRAVASCULAR at 09:04

## 2018-01-01 RX ADMIN — SODIUM CHLORIDE 250 ML: 9 INJECTION, SOLUTION INTRAVENOUS at 11:00

## 2018-01-01 RX ADMIN — SODIUM CHLORIDE 480 MG: 9 INJECTION, SOLUTION INTRAVENOUS at 15:20

## 2018-01-01 RX ADMIN — SODIUM CHLORIDE 480 MG: 9 INJECTION, SOLUTION INTRAVENOUS at 15:15

## 2018-01-01 RX ADMIN — SODIUM CHLORIDE 240 MG: 9 INJECTION, SOLUTION INTRAVENOUS at 11:30

## 2018-01-01 RX ADMIN — SODIUM CHLORIDE 480 MG: 9 INJECTION, SOLUTION INTRAVENOUS at 12:16

## 2018-01-01 RX ADMIN — SODIUM CHLORIDE 240 MG: 900 INJECTION, SOLUTION INTRAVENOUS at 11:52

## 2018-01-01 RX ADMIN — EPHEDRINE SULFATE 10 MG: 50 INJECTION, SOLUTION INTRAMUSCULAR; INTRAVENOUS; SUBCUTANEOUS at 13:25

## 2018-01-01 RX ADMIN — Medication: at 14:01

## 2018-01-01 RX ADMIN — IOPAMIDOL 126 ML: 755 INJECTION, SOLUTION INTRAVASCULAR at 09:08

## 2018-01-01 RX ADMIN — LIDOCAINE HYDROCHLORIDE AND EPINEPHRINE 10 ML: 10; 10 INJECTION, SOLUTION INFILTRATION; PERINEURAL at 13:49

## 2018-01-01 RX ADMIN — ISOSULFAN BLUE 5 MG: 50 INJECTION, SOLUTION SUBCUTANEOUS at 13:35

## 2018-01-01 RX ADMIN — EPHEDRINE SULFATE 5 MG: 50 INJECTION, SOLUTION INTRAMUSCULAR; INTRAVENOUS; SUBCUTANEOUS at 13:30

## 2018-01-01 RX ADMIN — TILMANOCEPT 0.5 MILLICURIE: KIT at 08:30

## 2018-01-01 RX ADMIN — GABAPENTIN 300 MG: 300 CAPSULE ORAL at 11:07

## 2018-01-01 RX ADMIN — SODIUM CHLORIDE, SODIUM LACTATE, POTASSIUM CHLORIDE, CALCIUM CHLORIDE: 600; 310; 30; 20 INJECTION, SOLUTION INTRAVENOUS at 11:23

## 2018-01-01 RX ADMIN — PROPOFOL 150 MCG/KG/MIN: 10 INJECTION, EMULSION INTRAVENOUS at 13:02

## 2018-01-01 RX ADMIN — SODIUM CHLORIDE 250 ML: 9 INJECTION, SOLUTION INTRAVENOUS at 11:51

## 2018-01-01 RX ADMIN — SODIUM CHLORIDE 240 MG: 9 INJECTION, SOLUTION INTRAVENOUS at 11:52

## 2018-01-01 RX ADMIN — SODIUM CHLORIDE 480 MG: 9 INJECTION, SOLUTION INTRAVENOUS at 09:11

## 2018-01-01 RX ADMIN — ACETAMINOPHEN 975 MG: 325 TABLET ORAL at 11:07

## 2018-01-01 RX ADMIN — ONDANSETRON 4 MG: 2 INJECTION INTRAMUSCULAR; INTRAVENOUS at 13:04

## 2018-01-01 RX ADMIN — BUPIVACAINE HYDROCHLORIDE 10 ML: 2.5 INJECTION, SOLUTION INFILTRATION; PERINEURAL at 13:48

## 2018-01-01 RX ADMIN — PROPOFOL 40 MG: 10 INJECTION, EMULSION INTRAVENOUS at 13:02

## 2018-01-01 RX ADMIN — SODIUM CHLORIDE 250 ML: 9 INJECTION, SOLUTION INTRAVENOUS at 13:36

## 2018-01-01 RX ADMIN — SODIUM CHLORIDE 480 MG: 9 INJECTION, SOLUTION INTRAVENOUS at 14:15

## 2018-01-01 RX ADMIN — FLUDEOXYGLUCOSE F-18 13.17 MCI.: 500 INJECTION, SOLUTION INTRAVENOUS at 08:22

## 2018-01-01 RX ADMIN — ZOSTER VACCINE RECOMBINANT, ADJUVANTED 0.5 ML: KIT at 13:06

## 2018-01-01 RX ADMIN — FENTANYL CITRATE 50 MCG: 50 INJECTION, SOLUTION INTRAMUSCULAR; INTRAVENOUS at 13:02

## 2018-01-01 RX ADMIN — SODIUM CHLORIDE 250 ML: 9 INJECTION, SOLUTION INTRAVENOUS at 11:30

## 2018-01-01 ASSESSMENT — ENCOUNTER SYMPTOMS
DIARRHEA: 1
HALLUCINATIONS: 0
DECREASED APPETITE: 0
NAIL CHANGES: 0
RECTAL PAIN: 1
HEARTBURN: 0
FATIGUE: 1
SMELL DISTURBANCE: 0
NECK MASS: 0
JAUNDICE: 0
POLYDIPSIA: 0
TASTE DISTURBANCE: 0
SINUS CONGESTION: 0
BLOOD IN STOOL: 0
NAUSEA: 0
SKIN CHANGES: 1
BOWEL INCONTINENCE: 1
NIGHT SWEATS: 0
INCREASED ENERGY: 0
SINUS PAIN: 0
ABDOMINAL PAIN: 0
POOR WOUND HEALING: 1
VOMITING: 0
HOARSE VOICE: 0
POLYPHAGIA: 0
FEVER: 0
BLOATING: 0
CONSTIPATION: 1
WEIGHT GAIN: 0
ALTERED TEMPERATURE REGULATION: 0
WEIGHT LOSS: 0
CHILLS: 0
TROUBLE SWALLOWING: 0
SORE THROAT: 0

## 2018-01-01 ASSESSMENT — PAIN SCALES - GENERAL
PAINLEVEL: NO PAIN (0)
PAINLEVEL: MILD PAIN (3)
PAINLEVEL: NO PAIN (0)
PAINLEVEL: MODERATE PAIN (5)
PAINLEVEL: NO PAIN (0)

## 2018-03-06 ENCOUNTER — TRANSFERRED RECORDS (OUTPATIENT)
Dept: HEALTH INFORMATION MANAGEMENT | Facility: CLINIC | Age: 63
End: 2018-03-06

## 2018-03-20 ENCOUNTER — TRANSFERRED RECORDS (OUTPATIENT)
Dept: HEALTH INFORMATION MANAGEMENT | Facility: CLINIC | Age: 63
End: 2018-03-20

## 2018-03-23 ENCOUNTER — PRE VISIT (OUTPATIENT)
Dept: ONCOLOGY | Facility: CLINIC | Age: 63
End: 2018-03-23

## 2018-03-23 NOTE — TELEPHONE ENCOUNTER
Date of appointment: Tuesday 4/3/18 with Dr. Wright, still waiting to see when we can get pt in with Dr. Valentine  Diagnosis/reason for appointment: Perianal Melanoma  Referring provider/facility: Self referral-2nd op  Who called: patient called    Recent Studies  Imaging: None  Pathology: [facility] 3/20/18 bx of perianal lesion  Labs: N/A  Previous chemo/radiation (if known): None    Records requested/received from: Records requested from [facility] sent to scanning, slides requested from [facility] ([facility] pathology called 3/28/18-slides were sent to [facility], they will send them to us after [facility] has read them)    Additional information: Patient has appt at [facility] on Thursday 3/29/18-will request records to be sent after that visit

## 2018-03-27 ENCOUNTER — TRANSFERRED RECORDS (OUTPATIENT)
Dept: HEALTH INFORMATION MANAGEMENT | Facility: CLINIC | Age: 63
End: 2018-03-27

## 2018-03-29 ENCOUNTER — TRANSFERRED RECORDS (OUTPATIENT)
Dept: HEALTH INFORMATION MANAGEMENT | Facility: CLINIC | Age: 63
End: 2018-03-29

## 2018-03-30 ENCOUNTER — TRANSFERRED RECORDS (OUTPATIENT)
Dept: HEALTH INFORMATION MANAGEMENT | Facility: CLINIC | Age: 63
End: 2018-03-30

## 2018-04-02 ENCOUNTER — TELEPHONE (OUTPATIENT)
Dept: SURGERY | Facility: CLINIC | Age: 63
End: 2018-04-02

## 2018-04-02 NOTE — TELEPHONE ENCOUNTER
[facility] Call Center    Phone Message    May a detailed message be left on voicemail: yes    Reason for Call: Symptoms or Concerns     If patient has red-flag symptoms, warm transfer to triage line    Current symptom or concern: Patient requesting details about tomorrow's consultation     Symptoms have been present for:     Has patient previously been seen for this? No    By :     Date:     Are there any new or worsening symptoms? No      Action Taken: Message routed to:  [facility] ([facility]

## 2018-04-02 NOTE — TELEPHONE ENCOUNTER
This RNCC called the patient to discuss her appointment for tomorrow with Dr. Wright. This RNCC informed the patient that her appointment would be 30 minute long and that she might have further imaging studies. Patient stated she had imaging (PET scan) done at [facility] last week and she has asked that all her scans and information from [facility] be sent directly to Dr. Wright's clinic, patient states she signed the release of information at [facility] for this information to be sent. Patient stated understanding of this plan of care. Patient verbalized back appointment time, date and location.

## 2018-04-03 ENCOUNTER — OFFICE VISIT (OUTPATIENT)
Dept: SURGERY | Facility: CLINIC | Age: 63
End: 2018-04-03
Payer: COMMERCIAL

## 2018-04-03 ENCOUNTER — APPOINTMENT (OUTPATIENT)
Dept: SURGERY | Facility: CLINIC | Age: 63
End: 2018-04-03
Payer: COMMERCIAL

## 2018-04-03 ENCOUNTER — ANESTHESIA EVENT (OUTPATIENT)
Dept: SURGERY | Facility: AMBULATORY SURGERY CENTER | Age: 63
End: 2018-04-03

## 2018-04-03 ENCOUNTER — ALLIED HEALTH/NURSE VISIT (OUTPATIENT)
Dept: SURGERY | Facility: CLINIC | Age: 63
End: 2018-04-03
Payer: COMMERCIAL

## 2018-04-03 VITALS
HEART RATE: 86 BPM | TEMPERATURE: 98.4 F | SYSTOLIC BLOOD PRESSURE: 144 MMHG | RESPIRATION RATE: 18 BRPM | DIASTOLIC BLOOD PRESSURE: 82 MMHG | BODY MASS INDEX: 34.62 KG/M2 | OXYGEN SATURATION: 97 % | HEIGHT: 66 IN | WEIGHT: 215.4 LBS

## 2018-04-03 VITALS
TEMPERATURE: 98.4 F | DIASTOLIC BLOOD PRESSURE: 91 MMHG | HEIGHT: 66 IN | SYSTOLIC BLOOD PRESSURE: 155 MMHG | HEART RATE: 86 BPM | WEIGHT: 215.3 LBS | OXYGEN SATURATION: 97 % | BODY MASS INDEX: 34.6 KG/M2

## 2018-04-03 DIAGNOSIS — C43.51: ICD-10-CM

## 2018-04-03 DIAGNOSIS — Z01.818 PREOP EXAMINATION: Primary | ICD-10-CM

## 2018-04-03 DIAGNOSIS — C43.51: Primary | ICD-10-CM

## 2018-04-03 RX ORDER — CALCIUM CARBONATE 500(1250)
1 TABLET ORAL EVERY EVENING
COMMUNITY

## 2018-04-03 ASSESSMENT — ENCOUNTER SYMPTOMS
RECTAL PAIN: 1
ABDOMINAL PAIN: 1
BRUISES/BLEEDS EASILY: 0
CONSTIPATION: 0
BLOOD IN STOOL: 1
VOMITING: 0
SWOLLEN GLANDS: 0
NAUSEA: 0
BLOATING: 0
BOWEL INCONTINENCE: 1
HEARTBURN: 0
DIARRHEA: 0
JAUNDICE: 0

## 2018-04-03 ASSESSMENT — PAIN SCALES - GENERAL: PAINLEVEL: MODERATE PAIN (4)

## 2018-04-03 NOTE — NURSING NOTE
Patient signed informed consent for surgery in clinic and this RNCC scanned it into chart.     This RN did patient teaching for bowel preporation of two fleets enemas that will need to be completed the day of surgery. Patient verbalized understanding of plan.

## 2018-04-03 NOTE — MR AVS SNAPSHOT
After Visit Summary   4/3/2018    Khloe Tamez    MRN: [id number]           Patient Information     Date Of Birth          1955        Visit Information        Provider Department      4/3/2018 10:00 AM Ronald Wright MD [facility] Colon and Rectal Surgery        Today's Diagnoses     Malignant melanoma of perianal skin (H)    -  1       Follow-ups after your visit        Additional Services     ONC/HEME ADULT REFERRAL           PAC Visit Referral (For [facility] Only)                 Your next 10 appointments already scheduled     Apr 05, 2018  3:00 PM CDT   (Arrive by 2:45 PM)   New Patient Visit with Nigel Valentine MD   [facility] ([facility])    [address] [place] [address]  [address]  [facility] [address] [phone]            Apr 13, 2018   Procedure with Ronald Wright MD   [facility] ([facility])    [address] [place] [address]  5th Floor  [facility] [address] [phone]           Located in the Clinics and Surgery Center at [address] [place] [address].   parking is very convenient and highly recommended.  is a $6 flat rate fee.  Both  and self parkers should enter the main arrival plaza from [facility]; parking attendants will direct you based on your parking preference.              Who to contact     Please call your clinic at [phone] to:    Ask questions about your health    Make or cancel appointments    Discuss your medicines    Learn about your test results    Speak to your doctor            Additional Information About Your Visit        MyChart Information     [employer] is an electronic gateway that provides easy, online access to your medical records. With [employer], you can request a clinic appointment, read your test results, renew a prescription or communicate with your care team.     To sign up for [employer] visit the website at  "[email or web address]   You will be asked to enter the access code listed below, as well as some personal information. Please follow the directions to create your username and password.     Your access code is: [email or web address]  Expires: 2018 11:36 AM     Your access code will  in 90 days. If you need help or a new code, please contact your [facility] or call [phone] for assistance.        Care EveryWhere ID     This is your Care EveryWhere ID. This could be used by other organizations to access your [place] medical records  [id number]        Your Vitals Were     Pulse Temperature Height Pulse Oximetry BMI (Body Mass Index)       86 98.4  F (36.9  C) (Oral) 5' 6\" 97% 34.75 kg/m2        Blood Pressure from Last 3 Encounters:   18 144/82   18 (!) 155/91    Weight from Last 3 Encounters:   18 215 lb 6.4 oz   18 215 lb 4.8 oz              We Performed the Following     ANOSCOPY W/WO BRUSH/WASH     ONC/HEME ADULT REFERRAL     PAC Visit Referral (For [facility] Only)     Liana-Operative Worksheet        Primary Care Provider Office Phone # Fax #    Aide Robertson [phone]       [place] [address] [place] [address]        Equal Access to Services     [facility]: Hadii katelyn wade hadasho Soraisa, waaxda luqadaha, qaybta kaalmada adeegyada, silvia fierro . So [facility] [phone].    ATENCIÓN: Si habla español, tiene a tim disposición servicios gratuitos de asistencia lingüística. Samantha al [phone].    We comply with applicable federal civil rights laws and [place] laws. We do not discriminate on the basis of race, color, national origin, age, disability, sex, sexual orientation, or gender identity.            Thank you!     Thank you for choosing [facility] COLON AND RECTAL SURGERY  for your care. Our goal is always to provide you with excellent care. Hearing back from our patients " is one way we can continue to improve our services. Please take a few minutes to complete the written survey that you may receive in the mail after your visit with us. Thank you!             Your Updated Medication List - Protect others around you: Learn how to safely use, store and throw away your medicines at www.disposemymeds.org.          This list is accurate as of 4/3/18  4:33 PM.  Always use your most recent med list.                   Brand Name Dispense Instructions for use Diagnosis    ALEVE PO      Take 220 mg by mouth every 12 hours        calcium carbonate 1250 MG tablet    OS-JANAY 500 mg [place]. Ca     Take 1 tablet by mouth every evening        MAGNESIUM PO      Take by mouth At Bedtime        TEA TREE EX      BODY WASH        VITAMIN D (CHOLECALCIFEROL) PO      Take by mouth every evening

## 2018-04-03 NOTE — NURSING NOTE
"Chief Complaint   Patient presents with     Rectal Problem     Melanoma from perianal lesion. Here to discuss surgery.       Vitals:    04/03/18 0924   BP: (!) 155/91   BP Location: Left arm   Patient Position: Chair   Cuff Size: Adult Large   Pulse: 86   Temp: 98.4  F (36.9  C)   TempSrc: Oral   SpO2: 97%   Weight: 215 lb 4.8 oz   Height: 5' 6\"       Body mass index is 34.75 kg/(m^2).    Rios TORREZ LPN                  "

## 2018-04-03 NOTE — PROGRESS NOTES
Colon and Rectal Surgery Clinic Note      RE: Khloe Tamez  : 1955  TATO: 4/3/2018      ID: [id number] referred for new anal melanoma    HPI: Ms. Tamez states she has had a long-standing lesion next to her anus and was told this was likely a hemorrhoid. This became painful around 2017 and she was referred to a surgeon. A shave biopsy was performed and returned a 3.5mm deep melanoma, positive deep margin. She was seen by providers at [facility], had bloodwork done and a CT PET scan which was reported to her as having an involved left inguinal lymph node. She is seeing us for a surgical opinion regarding management.    As far as symptoms, she describes daily anal discomfort, controlled with naproxen. She has 1 semiformed BM daily, with occassional bright blood in stool. Does have mucous drainage daily and wears a pad that she changes several times a day. Denies incontinence of gas, liquid, stool. Has had 1 Csxn, no vaginal deliveries. No perianal procedures.    Last colonoscopy 10 yrs ago (age 52), given 10 yr recall. No fam hx of colorectal cancer.  Father had melanoma of the toe, not intervened upon,  of other causes.  Has not had other skin cancers. Had complete skin exam by dermatologist which identified single back mole at [facility] (after melanoma diagnosis). Denies exposure to radiation or chemicals.    Records from patient portal from [facility]:  Labs: normal LFTs, normal CBC, normal BMP  Imaging: PET reportedly showed hypermetabolic anal canal lesion and mild avidity in left inguinal lymph node (images not available)    Examination was chaperoned by India Smith NP.    PEx:  NAD  Unlabored breathing on RA  No cervical or supraclavicular lymphadenopathy  Abdomen obese, soft, nonpalpable liver, no masses; well healed Csxn incision, no hernia  No inguinal adenopathy  Perianal exam - left lateral 2.5x2.0cm exophytic, pigmented lesion with ulceration, appears close but distingt from anal  verge. On digial exam mass is separate from muscles, no mucosal involvement on anoscopy.    Assessment/Recommendations:  62F with deep perianal melanoma (at least 3.5mm), and likely involvement of inguinal node. We will work to acquire scans/lab work from [place] to avoid having to repeat staging.    1. Have recommended local excision with possible tissue rearrangement for local control. Appears distinct from anal canal. Discussed with her that radical resection (APR) for melanoma has not been shown to improve survival, and in her case would not remove inguinal disease if that is truly involved. Excision is likely to also help with her anal drainage and perianal skin irritation. Have recommended thick barrier cream in interim for symptoms control.    2. We will review her PET scan. If indeed has aram involvement, recommendation would be systemic therapy to follow local excision, and have made referrals to Onc for discussion. Likely this would be immunotherapy, prefer to do local excision of primary before starting therapy.    3. She is due for 10 year screening colonoscopy, we will arrange for this in coming weeks.    We answered all of her and her 's questions.    Seen with Dr. Wright.     --  Sedrick Brito MD MPH  Fellow, Colon and Rectal Surgery  Pgr: [phone]        I saw and examined the patient, led the discussion and edited the resident note.  I agree with the assessment and plan as outlined.  I discussed the case by phone with Dr. Valentine who kindly agreed to see the patient on an expedited basis.  He and I will discuss to create a unified treatment plan but he agrees that local excision for local control is warranted.  We will review the PET-CT images when available; inguinal node is not definitely pathologic and I would consider US FNA if it is enlarged/identifiable.  I don't think a groin dissection is likely to be of therapeutic benefit or worth the associated morbidity. I expect the main  "treatment will be immunotherapy.       Total time 45 minutes, >50% counseling    Ronald Wright MD  Professor of Surgery           For details of past medical history, surgical history, family history, medications, allergies, and review of systems, please see details below.    Medical history:  No past medical history on file.    Surgical history:  Csxn 1992    Family history:  Father with cutaneous melanoma, age 75    Medications:  Current Outpatient Prescriptions   Medication Sig Dispense Refill     MAGNESIUM OXIDE PO        VITAMIN D, CHOLECALCIFEROL, PO Take by mouth daily       calcium carbonate (OS-JANAY 500 MG [place]. CA) 1250 MG tablet Take 1 tablet by mouth 2 times daily       NAPROXEN PO        Allergies:  The patienthas No Known Allergies.    Social history:  Social History   Substance Use Topics     Smoking status: Never Smoker     Smokeless tobacco: Never Used     Alcohol use No     Marital status: .  Works as caretaker.    Review of Systems:  Nursing Notes:   Brittany Kelly LPN  4/3/2018  9:28 AM  Signed  Chief Complaint   Patient presents with     Rectal Problem     Melanoma from perianal lesion. Here to discuss surgery.       Vitals:    04/03/18 0924   BP: (!) 155/91   BP Location: Left arm   Patient Position: Chair   Cuff Size: Adult Large   Pulse: 86   Temp: 98.4  F (36.9  C)   TempSrc: Oral   SpO2: 97%   Weight: 215 lb 4.8 oz   Height: 5' 6\"       Body mass index is 34.75 kg/(m^2).    Zong X, MORENAN                         Ronald Wright MD   Professor and Chief  Division of Colon and Rectal Surgery  [facility]      Referring Provider:  Binta [place] [address]     Primary Care Provider:  Aide Robertson    Answers for HPI/ROS submitted by the patient on 4/3/2018   General Symptoms: No  Skin Symptoms: No  HENT Symptoms: No  EYE SYMPTOMS: No  HEART SYMPTOMS: No  LUNG SYMPTOMS: No  INTESTINAL SYMPTOMS: " Yes  URINARY SYMPTOMS: No  GYNECOLOGIC SYMPTOMS: No  BREAST SYMPTOMS: No  SKELETAL SYMPTOMS: No  BLOOD SYMPTOMS: Yes  NERVOUS SYSTEM SYMPTOMS: No  MENTAL HEALTH SYMPTOMS: No  Heart burn or indigestion: No  Nausea: No  Vomiting: No  Abdominal pain: Yes  Bloating: No  Constipation: No  Diarrhea: No  Blood in stool: Yes  Black stools: No  Rectal or Anal pain: Yes  Fecal incontinence: Yes  Yellowing of skin or eyes: No  Vomit with blood: No  Change in stools: No  Anemia: No  Swollen glands: No  Easy bleeding or bruising: No  Edema or swelling: No

## 2018-04-03 NOTE — PATIENT INSTRUCTIONS
Preparing for Your Surgery      Name:  Khloe Tamez   MRN:  [id number]   :  1955   Today's Date:  4/3/2018     Arriving for surgery:  Surgery date:  2018  Arrival time:  1:00 pm  Please come to:     [facility]  [address] [place] [address]     Parking is available in front of the Clinics and Surgery Center building from 5:30AM to 8:00PM.  -  Proceed to the 5th floor to check into the Ambulatory Surgery Center.              >> There will be patient concierges on the 1st and 5th floor, for assistance or an escort, if you would like.              >> Please call [phone] with any questions.    What can I eat or drink?  -  You may have solid food or milk products until 8 hours prior to your surgery.  -  You may have water, apple juice or 7up/Sprite until 2 hours prior to your surgery.    Which medicines can I take?  -  Do NOT take these medications in the morning, the day of surgery:  Stop naproxen 3 days prior    -  Please take these medications the day of surgery:  NONE    How do I prepare myself?  -  Take two showers: one the night before surgery; and one the morning of surgery.         Use Scrubcare or Hibiclens to wash from neck down.  You may use your own shampoo and conditioner. No other hair products.   -  Do NOT use lotion, powder, deodorant, or antiperspirant the day of your surgery.  -  Do NOT wear any makeup, fingernail polish or jewelry.  -Do not bring your own medications to the hospital, except for inhalers and eye drops.  -  Bring your ID and insurance card.    Questions or Concerns:  If you have questions or concerns regarding the day of surgery, please call the Preoperative Assessment Center (PAC), Monday-Friday 7AM-7PM:  [phone].  After surgery please call your surgeons office.       AFTER YOUR SURGERY  Breathing exercises   Breathing exercises help you recover faster. Take deep breaths and let the air out slowly. This will:      Help you wake up after surgery.    Help prevent complications like pneumonia.  Preventing complications will help you go home sooner.   We may give you a breathing device (incentive spirometer) to encourage you to breathe deeply.   Nausea and vomiting   You may feel sick to your stomach after surgery; if so, let your nurse know.    Pain control:  After surgery, you may have pain. Our goal is to help you manage your pain. Pain medicine will help you feel comfortable enough to do activities that will help you heal.  These activities may include breathing exercises, walking and physical therapy.   To help your health care team treat your pain we will ask: 1) If you have pain  2) where it is located 3) describe your pain in your words  Methods of pain control include medications given by mouth, vein or by nerve block for some surgeries.  We may give you a pain control pump that will:  1) Deliver the medicine through a tube placed in your vein  2) Control the amount of medicine you receive  3) Allow you to push a button to deliver a dose of pain medicine  Sequential Compression Device (SCD) or Pneumo Boots:  You may need to wear SCD S on your legs or feet. These are wraps connected to a machine that pumps in air and releases it. The repeated pumping helps prevent blood clots from forming.

## 2018-04-03 NOTE — LETTER
4/3/2018       RE: Khloe Tamez  [address] [facility] [address]     Dear Colleague,    Thank you for referring your patient, Khloe Tamez, to the [facility] COLON AND RECTAL SURGERY at [facility]. Please see a copy of my visit note below.    Colon and Rectal Surgery Clinic Note      RE: Khloe Tamez  : 1955  TATO: 4/3/2018      ID: [id number] referred for new anal melanoma    HPI: Ms. Tamez states she has had a long-standing lesion next to her anus and was told this was likely a hemorrhoid. This became painful around 2017 and she was referred to a surgeon. A shave biopsy was performed and returned a 3.5mm deep melanoma, positive deep margin. She was seen by providers at [facility], had bloodwork done and a CT PET scan which was reported to her as having an involved left inguinal lymph node. She is seeing us for a surgical opinion regarding management.    As far as symptoms, she describes daily anal discomfort, controlled with naproxen. She has 1 semiformed BM daily, with occassional bright blood in stool. Does have mucous drainage daily and wears a pad that she changes several times a day. Denies incontinence of gas, liquid, stool. Has had 1 Csxn, no vaginal deliveries. No perianal procedures.    Last colonoscopy 10 yrs ago (age 52), given 10 yr recall. No fam hx of colorectal cancer.  Father had melanoma of the toe, not intervened upon,  of other causes.  Has not had other skin cancers. Had complete skin exam by dermatologist which identified single back mole at [facility] (after melanoma diagnosis). Denies exposure to radiation or chemicals.    Records from patient portal from [facility]:  Labs: normal LFTs, normal CBC, normal BMP  Imaging: PET reportedly showed hypermetabolic anal canal lesion and mild avidity in left inguinal lymph node (images not available)    Examination was chaperoned by India Smtih NP.    PEx:  NAD  Unlabored  breathing on RA  No cervical or supraclavicular lymphadenopathy  Abdomen obese, soft, nonpalpable liver, no masses; well healed Csxn incision, no hernia  No inguinal adenopathy  Perianal exam - left lateral 2.5x2.0cm exophytic, pigmented lesion with ulceration, appears close but distingt from anal verge. On digial exam mass is separate from muscles, no mucosal involvement on anoscopy.    Assessment/Recommendations:  62F with deep perianal melanoma (at least 3.5mm), and likely involvement of inguinal node. We will work to acquire scans/lab work from [place] to avoid having to repeat staging.    1. Have recommended local excision with possible tissue rearrangement for local control. Appears distinct from anal canal. Discussed with her that radical resection (APR) for melanoma has not been shown to improve survival, and in her case would not remove inguinal disease if that is truly involved. Excision is likely to also help with her anal drainage and perianal skin irritation. Have recommended thick barrier cream in interim for symptoms control.    2. We will review her PET scan. If indeed has aram involvement, recommendation would be systemic therapy to follow local excision, and have made referrals to Onc for discussion. Likely this would be immunotherapy, prefer to do local excision of primary before starting therapy.    3. She is due for 10 year screening colonoscopy, we will arrange for this in coming weeks.    We answered all of her and her 's questions.    Seen with Dr. Wright.     --  Sedrick Brito MD MPH  Fellow, Colon and Rectal Surgery  Pgr: [phone]        I saw and examined the patient, led the discussion and edited the resident note.  I agree with the assessment and plan as outlined.  I discussed the case by phone with Dr. Valentine who kindly agreed to see the patient on an expedited basis.  He and I will discuss to create a unified treatment plan but he agrees that local excision for local control  "is warranted.  We will review the PET-CT images when available; inguinal node is not definitely pathologic and I would consider US FNA if it is enlarged/identifiable.  I don't think a groin dissection is likely to be of therapeutic benefit or worth the associated morbidity. I expect the main treatment will be immunotherapy.       Total time 45 minutes, >50% counseling    Ronald Wright MD  Professor of Surgery           For details of past medical history, surgical history, family history, medications, allergies, and review of systems, please see details below.    Medical history:  No past medical history on file.    Surgical history:  Csxn 1992    Family history:  Father with cutaneous melanoma, age 75    Medications:  Current Outpatient Prescriptions   Medication Sig Dispense Refill     MAGNESIUM OXIDE PO        VITAMIN D, CHOLECALCIFEROL, PO Take by mouth daily       calcium carbonate (OS-JANAY 500 MG [place]. CA) 1250 MG tablet Take 1 tablet by mouth 2 times daily       NAPROXEN PO        Allergies:  The patienthas No Known Allergies.    Social history:  Social History   Substance Use Topics     Smoking status: Never Smoker     Smokeless tobacco: Never Used     Alcohol use No     Marital status: .  Works as caretaker.    Review of Systems:  Nursing Notes:   Brittany Kelly LPN  4/3/2018  9:28 AM  Signed  Chief Complaint   Patient presents with     Rectal Problem     Melanoma from perianal lesion. Here to discuss surgery.       Vitals:    04/03/18 0924   BP: (!) 155/91   BP Location: Left arm   Patient Position: Chair   Cuff Size: Adult Large   Pulse: 86   Temp: 98.4  F (36.9  C)   TempSrc: Oral   SpO2: 97%   Weight: 215 lb 4.8 oz   Height: 5' 6\"       Body mass index is 34.75 kg/(m^2).    Rios TORREZ LPN    Referring Provider:  Binta Muñoz MD  [address] [address]     Primary Care Provider:  Aide Robertson    Again, thank you for allowing me to participate in the " care of your patient.      Sincerely,    Ronald Wright MD

## 2018-04-03 NOTE — H&P
Pre-Operative H & P     CC:  Preoperative exam to assess for increased cardiopulmonary risk while undergoing surgery and anesthesia.    Date of Encounter: April 3, 2018   Primary Care Physician:  Aide Robertson   Reason for Visit/Surgery:  Malignant melanoma of perianal skin (H) [C43.51]      HPI  Khloe Tamez is a 62 year old female who presents for pre-operative H & P in preparation for an Examination Under Anesthesia Anus, Local Excision of Perianal Melanoma on 18 with  Dr. Wright for Malignant melanoma of perianal skin at the [facility].     Ms. Tamez has a long-standing lesion next to her anus and was told this was likely a hemorrhoid. This past  2017 she was experiencing more pain and had a biopsy of the area which showed a 3.5mm deep melanoma, positive deep margin. She was seen by providers at [place], had bloodwork done and a CT PET scan which was reported to her as having an involved left inguinal lymph node. She sought another opinion today with  Dr. Wright and the above surgery has been recommended.  She does have  daily anal discomfort, controlled with naproxen. She also notices occasional bright blood in stool and has mucous drainage daily and wears a pad that she changes several times a day.  Otherwise, she has no known cardiopulmonary disease.    History is obtained from the patient and  medical record including Care Everywhere.        Past Medical History  Past Medical History:   Diagnosis Date     Melanoma malignant, perianal skin (H)      Obesity (BMI 30.0-34.9)      Osteoarthritis         Past Surgical History  Past Surgical History:   Procedure Laterality Date      SECTION       COLONOSCOPY         Hx of Blood transfusions/reactions: No transfusion history       Personal or FH with difficulty with Anesthesia:  None    Prior to Admission Medications  Current Outpatient Prescriptions   Medication Sig Dispense Refill     MAGNESIUM OXIDE PO         VITAMIN D, CHOLECALCIFEROL, PO Take by mouth daily       calcium carbonate (OS-JANAY 500 MG [place]. [place]) 1250 MG tablet Take 1 tablet by mouth 2 times daily       NAPROXEN PO            Allergies  Review of patient's allergies indicates no known allergies.     Social History  Social History     Social History     Marital status:      Spouse name: N/A     Number of children: N/A     Years of education: N/A     Occupational History     Not on file.     Social History Main Topics     Smoking status: Never Smoker     Smokeless tobacco: Never Used     Alcohol use No     Drug use: No     Sexual activity: Not on file     Other Topics Concern     Not on file     Social History Narrative          Family History  No family history of bleeding, clotting disorders or complications with anesthesia.      ROS   The complete review of systems is negative other than noted in the HPI or here.   Constitutional: Denies  fevers/chills.    EENT: Denies difficulty opening mouth or swallowing.  Cardiovascular: Denies pain, tightness or squeezing in chest, upper abdomen, shoulder, or neck.  Denies BRYANT or orthopnea, palpitations or syncope.  Respiratory: Denies significant shortness of breath or cough.    GI: Denies frequent heartburn, nausea/vomiting     : Denies dysuria   Musculoskeletal: General OA   Skin: Denies rashes, infection or wounds.    Hematologic: Denies prolonged bleeding, anemia or blood clot history  Neurologic: Denies history of stroke, TIA, migraines, seizures, dizziness, numbness/tingling; denies short term memory loss  Psychiatric: Denies changes in mood or affect.      Cardiology Tests: (personally reviewed):   Review Results Below in A/P    Labs: (personally reviewed):  Outside Labs 3/29/18:  Hgb 14.5    Potassium 5.2  Cr 0.8      Physical Exam:  No LMP recorded.   Vital signs:  There were no vitals taken for this visit.    Constitutional: Awake, alert, cooperative, no apparent distress, and appears stated  age.  Eyes: Pupils equal, round and reactive to light, sclera clear, conjunctiva normal.  HENT: Normocephalic, oral pharynx with moist mucus membranes. No goiter appreciated.   Respiratory: Clear to auscultation bilaterally, no crackles or wheezing.  Cardiovascular: Regular rate and rhythm and no overt murmur noted.  No carotid bruits auscultated. No edema. Palpable pulses to radial  DP and PT arteries.   GI: Normal bowel sounds, soft, non-distended, non-tender, no masses palpated  Skin: Warm and dry.  No rashes at anticipated surgical site.   Musculoskeletal: Full extension of the neck.  No redness, warmth, or swelling of the joints noted. Gross motor strength is normal.    Neurologic: Awake, alert, oriented to name, place and time.  Gait is normal.   Neuropsychiatric: Calm, cooperative. Normal affect.     Assessment/Plan  Khloe Tamez is a 62 year old female who presents for pre-operative H & P in preparation for an Examination Under Anesthesia Anus, Local Excision of Perianal Melanoma on 4/13/18 with  Dr. Wright for Malignant melanoma of perianal skin at the [facility].    PAC referral for risk assessment and optimization for anesthesia with comorbid conditions of:    Pre-operative considerations:  1.  Cardiac:  Functional status METS >4   Risk of Major Adverse Cardiac event: 0.4%  -No known cardiac disease  2.  Pulm:   PEDRO LUIS risk:  Intermediate  -No known pulmonary disease    3.  GI:  Risk of PONV score =3 .  If > 2, anti-emetic intervention recommended.    Patient is optimized and is an acceptable candidate for the proposed procedure.  No further diagnostic evaluation is needed.      AVS given to patient regarding medication instructions,  surgery time/arrival time and NPO status.  Chely Kinney MS PA-C   Preoperative Assessment Center  [facility]  Clinic and Surgery Center  Phone: [phone]  Fax: [phone]

## 2018-04-03 NOTE — ANESTHESIA PREPROCEDURE EVALUATION
Anesthesia Evaluation     . Pt has had prior anesthetic. Type: General and MAC    No history of anesthetic complications          ROS/MED HX    ENT/Pulmonary:     (+)PEDRO LUIS risk factors snores loudly, obese, , . .    Neurologic:  - neg neurologic ROS     Cardiovascular:     (+) ----. : . . . :. . No previous cardiac testing       METS/Exercise Tolerance:  >4 METS   Hematologic:  - neg hematologic  ROS       Musculoskeletal:   (+) arthritis, , , -       GI/Hepatic:  - neg GI/hepatic ROS       Renal/Genitourinary:  - ROS Renal section negative       Endo:     (+) Obesity, .      Psychiatric:  - neg psychiatric ROS       Infectious Disease:  - neg infectious disease ROS       Malignancy:   (+) Malignancy History of Skin  Skin CA Active status post,         Other:    (+) no H/O Chronic Pain,                   Physical Exam  Normal systems: cardiovascular and pulmonary    Airway   Mallampati: I  TM distance: >3 FB  Neck ROM: full    Dental   (+) caps    Cardiovascular   Rhythm and rate: regular and normal      Pulmonary    breath sounds clear to auscultation    Other findings: Outside Labs 3/29/18:  Hgb 14.5    Potassium 5.2  Cr 0.8           PAC Discussion and Assessment    ASA Classification: 2  Case is suitable for: ASC  Anesthetic techniques and relevant risks discussed: MAC with GA as backup  Invasive monitoring and risk discussed:   Types:   Possibility and Risk of blood transfusion discussed:   NPO instructions given:   Additional anesthetic preparation and risks discussed:   Needs early admission to pre-op area:   Other:     PAC Resident/NP Anesthesia Assessment:  Khloe Tamez is a 62 year old female who presents for pre-operative H & P in preparation for an Examination Under Anesthesia Anus, Local Excision of Perianal Melanoma on 4/13/18 with  Dr. Wright for Malignant melanoma of perianal skin at the [facility].    PAC referral for risk assessment and optimization for anesthesia  with comorbid conditions of:    Pre-operative considerations:  1.  Cardiac:  Functional status METS >4   Risk of Major Adverse Cardiac event: 0.4%  -No known cardiac disease  2.  Pulm:   PEDRO LUIS risk:  Intermediate  -No known pulmonary disease    3.  GI:  Risk of PONV score =3 .  If > 2, anti-emetic intervention recommended.    Patient is optimized and is an acceptable candidate for the proposed procedure.  No further diagnostic evaluation is needed.    Chely Kinney MS, PA-C  04/03/18 2:08 PM        Mid-Level Provider/Resident:   Date:   Time:     Attending Anesthesiologist Anesthesia Assessment:  I discussed the patient with the JACQUELYN but I did not personally see the patient during this visit.   Patient currently medically optimized for the proposed procedure.   Final anesthetic plan and recommendations to be made by the attending anesthesiologist on the day of surgery.       Reviewed and Signed by PAC Anesthesiologist  Anesthesiologist: YULISA  Date: 4/3/18  Time:   Pass/Fail: Pass  Disposition:     PAC Pharmacist Assessment:        Pharmacist:   Date:   Time:      Anesthesia Plan      History & Physical Review  History and physical reviewed and following examination; no interval change.    ASA Status:  2 .    NPO Status:  > 8 hours    Plan for General and ETT with Intravenous induction. Maintenance will be TIVA.    PONV prophylaxis:  Ondansetron (or other 5HT-3)       Postoperative Care  Postoperative pain management:  Multi-modal analgesia.      Consents  Anesthetic plan, risks, benefits and alternatives discussed with:  Patient..                          .

## 2018-04-05 ENCOUNTER — ONCOLOGY VISIT (OUTPATIENT)
Dept: ONCOLOGY | Facility: CLINIC | Age: 63
End: 2018-04-05
Attending: INTERNAL MEDICINE
Payer: COMMERCIAL

## 2018-04-05 VITALS
HEIGHT: 66 IN | BODY MASS INDEX: 34.67 KG/M2 | TEMPERATURE: 99 F | DIASTOLIC BLOOD PRESSURE: 85 MMHG | WEIGHT: 215.7 LBS | OXYGEN SATURATION: 99 % | SYSTOLIC BLOOD PRESSURE: 143 MMHG | HEART RATE: 97 BPM

## 2018-04-05 DIAGNOSIS — C21.0 MALIGNANT MELANOMA OF ANUS (H): Primary | ICD-10-CM

## 2018-04-05 LAB
ALBUMIN SERPL-MCNC: 3.7 G/DL (ref 3.4–5)
ALP SERPL-CCNC: 103 U/L (ref 40–150)
ALT SERPL W P-5'-P-CCNC: 22 U/L (ref 0–50)
ANION GAP SERPL CALCULATED.3IONS-SCNC: 6 MMOL/L (ref 3–14)
AST SERPL W P-5'-P-CCNC: 11 U/L (ref 0–45)
BASOPHILS # BLD AUTO: 0 10E9/L (ref 0–0.2)
BASOPHILS NFR BLD AUTO: 0.5 %
BILIRUB SERPL-MCNC: 0.3 MG/DL (ref 0.2–1.3)
BUN SERPL-MCNC: 29 MG/DL (ref 7–30)
CALCIUM SERPL-MCNC: 8.6 MG/DL (ref 8.5–10.1)
CHLORIDE SERPL-SCNC: 111 MMOL/L (ref 94–109)
CO2 SERPL-SCNC: 26 MMOL/L (ref 20–32)
CREAT SERPL-MCNC: 0.75 MG/DL (ref 0.52–1.04)
DIFFERENTIAL METHOD BLD: NORMAL
EOSINOPHIL # BLD AUTO: 0.3 10E9/L (ref 0–0.7)
EOSINOPHIL NFR BLD AUTO: 3.4 %
ERYTHROCYTE [DISTWIDTH] IN BLOOD BY AUTOMATED COUNT: 13.6 % (ref 10–15)
GFR SERPL CREATININE-BSD FRML MDRD: 79 ML/MIN/1.7M2
GLUCOSE SERPL-MCNC: 93 MG/DL (ref 70–99)
HCT VFR BLD AUTO: 43.7 % (ref 35–47)
HGB BLD-MCNC: 14 G/DL (ref 11.7–15.7)
IMM GRANULOCYTES # BLD: 0 10E9/L (ref 0–0.4)
IMM GRANULOCYTES NFR BLD: 0.3 %
LDH SERPL L TO P-CCNC: 187 U/L (ref 81–234)
LYMPHOCYTES # BLD AUTO: 1.8 10E9/L (ref 0.8–5.3)
LYMPHOCYTES NFR BLD AUTO: 23.3 %
MCH RBC QN AUTO: 28.9 PG (ref 26.5–33)
MCHC RBC AUTO-ENTMCNC: 32 G/DL (ref 31.5–36.5)
MCV RBC AUTO: 90 FL (ref 78–100)
MONOCYTES # BLD AUTO: 0.4 10E9/L (ref 0–1.3)
MONOCYTES NFR BLD AUTO: 5.7 %
NEUTROPHILS # BLD AUTO: 5.2 10E9/L (ref 1.6–8.3)
NEUTROPHILS NFR BLD AUTO: 66.8 %
NRBC # BLD AUTO: 0 10*3/UL
NRBC BLD AUTO-RTO: 0 /100
PLATELET # BLD AUTO: 284 10E9/L (ref 150–450)
POTASSIUM SERPL-SCNC: 3.8 MMOL/L (ref 3.4–5.3)
PROT SERPL-MCNC: 7.4 G/DL (ref 6.8–8.8)
RBC # BLD AUTO: 4.84 10E12/L (ref 3.8–5.2)
SODIUM SERPL-SCNC: 143 MMOL/L (ref 133–144)
WBC # BLD AUTO: 7.7 10E9/L (ref 4–11)

## 2018-04-05 PROCEDURE — 83615 LACTATE (LD) (LDH) ENZYME: CPT | Performed by: INTERNAL MEDICINE

## 2018-04-05 PROCEDURE — 80053 COMPREHEN METABOLIC PANEL: CPT | Performed by: INTERNAL MEDICINE

## 2018-04-05 PROCEDURE — 99205 OFFICE O/P NEW HI 60 MIN: CPT | Mod: ZP | Performed by: INTERNAL MEDICINE

## 2018-04-05 PROCEDURE — 85025 COMPLETE CBC W/AUTO DIFF WBC: CPT | Performed by: INTERNAL MEDICINE

## 2018-04-05 PROCEDURE — G0463 HOSPITAL OUTPT CLINIC VISIT: HCPCS | Mod: ZF

## 2018-04-05 PROCEDURE — 36415 COLL VENOUS BLD VENIPUNCTURE: CPT

## 2018-04-05 ASSESSMENT — PAIN SCALES - GENERAL: PAINLEVEL: MODERATE PAIN (4)

## 2018-04-05 NOTE — NURSING NOTE
"Oncology Rooming Note    April 5, 2018 2:46 PM   Khloe Tamez is a 62 year old female who presents for:    Chief Complaint   Patient presents with     Oncology Clinic Visit     new melanoma     Initial Vitals: /85 (BP Location: Right arm, Patient Position: Sitting)  Pulse 97  Temp 99  F (37.2  C) (Oral)  Ht 1.676 m (5' 5.98\")  Wt 97.8 kg (215 lb 11.2 oz)  LMP  (Approximate)  SpO2 99%  BMI 34.83 kg/m2 Estimated body mass index is 34.83 kg/(m^2) as calculated from the following:    Height as of this encounter: 1.676 m (5' 5.98\").    Weight as of this encounter: 97.8 kg (215 lb 11.2 oz). Body surface area is 2.13 meters squared.  Moderate Pain (4) Comment: Data Unavailable   No LMP recorded (approximate). Patient is postmenopausal.  Allergies reviewed: Yes  Medications reviewed: No    Medications: Medication refills not needed today.  Pharmacy name entered into [facility]: [place], [place]    Clinical concerns: new      6 minutes for nursing intake (face to face time)     Rabia Ignacio RN              "

## 2018-04-05 NOTE — PROGRESS NOTES
[facility]  MEDICAL ONCOLOGY CONSULT  Apr 5, 2018    CHIEF COMPLAINT: Perianal melanoma    HISTORY OF PRESENT ILLNESS  Khloe Tamez is a 62 year old female with a new diagnosis of perianal melanoma. She is referred by Dr. Wright prior to planned surgical resection for multidisciplinary management.    On 3/27/18 patient had perianal lesion biopsied in [place] ([id number]). This showed malignant melanoma, positive for HMB45 and S100, and negative for CD45 and pancytokeratin. Breslow depth at least 3.5 mm, non-ulcerated, with 5 mitoses/mm2, TILs present with deep and peripheral margins positive; pT3a.    On 3/29/18, PET-CT showed a perianal FDG avid lesion (SUV max 10.8) and a superficial left inguinal lymph node measuring 0.9 cm (SUV max 4.1).    She presents now for consideration of next steps. Other than the irritation caused by the perianal lesion she denies any symptoms. Other than stress of diagnosis she has had mild headache, but no new focal neurologic deficits or seizures. No cough, shortness of breath at baseline, nausea, vomiting, or abdominal pain. She has had no fevers or chills. No new palpable lumps or bumps. KPS 90.    REVIEW OF SYSTEMS  A 12-point ROS negative except as in HPI.    Current Outpatient Prescriptions   Medication Sig Dispense Refill     VITAMIN D, CHOLECALCIFEROL, PO Take by mouth every evening        calcium carbonate (OS-JANAY 500 MG [place]. [place]) 1250 MG tablet Take 1 tablet by mouth every evening        Naproxen Sodium (ALEVE PO) Take 220 mg by mouth every 12 hours       MAGNESIUM PO Take by mouth At Bedtime       Tea Tree Oil (TEA TREE EX) BODY WASH         No Known Allergies  Immunization History   Administered Date(s) Administered     [id number] Novel Flu 12/16/2009     Hep B, Peds or Adolescent 11/03/2008, 12/08/2008, 03/13/2009     HepB-Adult 12/08/2008, 03/13/2009     Influenza (High Dose) 3 valent vaccine 10/13/2017     Influenza (IIV3) PF 11/06/2006, 09/29/2009,  "10/26/2010, 10/20/2011     Influenza Vaccine IM 3yrs+ 4 Valent IIV4 10/15/2014, 10/28/2015     TD (ADULT, 7+) 2000       Past Medical History:   Diagnosis Date     Melanoma malignant, perianal skin (H)      Obesity (BMI 30.0-34.9)      Osteoarthritis        Past Surgical History:   Procedure Laterality Date      SECTION       COLONOSCOPY         SOCIAL HISTORY  History   Smoking Status     Never Smoker   Smokeless Tobacco     Never Used      Alcohol use No    History   Drug Use No     FAMILY HISTORY  No family history of cancer or melanoma.    PHYSICAL EXAMINATION  /85 (BP Location: Right arm, Patient Position: Sitting)  Pulse 97  Temp 99  F (37.2  C) (Oral)  Ht 1.676 m (5' 5.98\")  Wt 97.8 kg (215 lb 11.2 oz)  LMP  (Approximate)  SpO2 99%  BMI 34.83 kg/m2  Wt Readings from Last 2 Encounters:   18 97.8 kg (215 lb 11.2 oz)   18 97.7 kg (215 lb 6.4 oz)     Physical Exam   Constitutional: She is oriented to person, place, and time. She appears well-developed and well-nourished.   HENT:   Mouth/Throat: Oropharynx is clear and moist.   Eyes: EOM are normal. Pupils are equal, round, and reactive to light. No scleral icterus.   Neck: Normal range of motion. Neck supple.   Cardiovascular: Regular rhythm.    Pulmonary/Chest: Effort normal and breath sounds normal. She has no wheezes.   Abdominal: Soft. She exhibits no mass.   Musculoskeletal: Normal range of motion. She exhibits no tenderness.   Lymphadenopathy:     She has no cervical adenopathy.   Neurological: She is alert and oriented to person, place, and time. No cranial nerve deficit.   Skin: Skin is warm and dry. No rash noted. No pallor.   Psychiatric: She has a normal mood and affect.   Nursing note and vitals reviewed.      ASSESSMENT AND PLAN    #1 Clinical stage III melanoma, perianal primary  It was a pleasure to meet Ms. Tamez today. She is a 62 year old woman with a new diagnosis of perianal melanoma. The lesion had " involved deep margin at 3.3 mm, so likely T3-T4 tumor. She has no clinically palpable lymph nodes, but has a 0.9 mm FDG avid lymph node in the drainage basin for the anus. I recommended she proceed with plan for wide local excision surgery and sentinel node biopsy with consideration of inguinal lymph node dissection.    We reviewed the lack of data for pre-operative immunotherapy in this context. We reviewed adjuvant checkpoint inhibitor therapy improves outcomes for patients with stage III melanoma. After complete resection 12-month RFS was 70.5% for nivolumab and 60.8% for ipilimumab in the Checkmate-238, versus 56% in the placebo group of the EORTC-60649 study.     We will also plan to review her case in melanoma tumor boards, and complete the staging evaluation with MRI brain.  FMLA form taken from patient to be filled and faxed/mailed to employer. Patient authorized me to send [facility] molecular studies and PD-L1 expression testing on the wide local specimen.     I will plan for her return in 4 weeks to review pathology and review follow-up and post-op immunotherapy options. Thank you for the consult..    Nigel Person M.D.   of Medicine  Hematology, Oncology and Transplantation      Malignant melanoma of anus (H)  - MR Brain w/o & w Contrast  - CBC with platelets differential  - Comprehensive metabolic panel  - Lactate Dehydrogenase    ADDENDUM: 4/9/18  Review of labs show normal BMP with slightly elevated chloride (111). Creatinine is normal. No liver function test abnormalities. CBC is normal and hemoglobin is 14 g/dL.

## 2018-04-05 NOTE — NURSING NOTE
Chief Complaint   Patient presents with     Lab Only     labs drawn with vpt by rn.     Labs drawn with vpt by rn.  Pt tolerated well.  VS taken.  Pt checked in for next appt.    Sandie Archer RN

## 2018-04-05 NOTE — LETTER
4/5/2018       RE: Khloe Tamez  [address] [facility] [id number]     Dear Colleague,    Thank you for referring your patient, Khloe Tamez, to the [facility]. Please see a copy of my visit note below.    [facility]  MEDICAL ONCOLOGY CONSULT  Apr 5, 2018    CHIEF COMPLAINT: Perianal melanoma    HISTORY OF PRESENT ILLNESS  Khloe Tamez is a 62 year old female with a new diagnosis of perianal melanoma. She is referred by Dr. Wright prior to planned surgical resection for multidisciplinary management.    On 3/27/18 patient had perianal lesion biopsied in [place] ([id number]). This showed malignant melanoma, positive for HMB45 and S100, and negative for CD45 and pancytokeratin. Breslow depth at least 3.5 mm, non-ulcerated, with 5 mitoses/mm2, TILs present with deep and peripheral margins positive; pT3a.    On 3/29/18, PET-CT showed a perianal FDG avid lesion (SUV max 10.8) and a superficial left inguinal lymph node measuring 0.9 cm (SUV max 4.1).    She presents now for consideration of next steps. Other than the irritation caused by the perianal lesion she denies any symptoms. Other than stress of diagnosis she has had mild headache, but no new focal neurologic deficits or seizures. No cough, shortness of breath at baseline, nausea, vomiting, or abdominal pain. She has had no fevers or chills. No new palpable lumps or bumps. KPS 90.    REVIEW OF SYSTEMS  A 12-point ROS negative except as in HPI.    Current Outpatient Prescriptions   Medication Sig Dispense Refill     VITAMIN D, CHOLECALCIFEROL, PO Take by mouth every evening        calcium carbonate (OS-JANAY 500 MG [place]. [place]) 1250 MG tablet Take 1 tablet by mouth every evening        Naproxen Sodium (ALEVE PO) Take 220 mg by mouth every 12 hours       MAGNESIUM PO Take by mouth At Bedtime       Tea Tree Oil (TEA TREE EX) BODY WASH         No Known Allergies  Immunization History   Administered Date(s) Administered      "[id number] Novel Flu 2009     Hep B, Peds or Adolescent 2008, 2008, 2009     HepB-Adult 2008, 2009     Influenza (High Dose) 3 valent vaccine 10/13/2017     Influenza (IIV3) PF 2006, 2009, 10/26/2010, 10/20/2011     Influenza Vaccine IM 3yrs+ 4 Valent IIV4 10/15/2014, 10/28/2015     TD (ADULT, 7+) 2000       Past Medical History:   Diagnosis Date     Melanoma malignant, perianal skin (H)      Obesity (BMI 30.0-34.9)      Osteoarthritis        Past Surgical History:   Procedure Laterality Date      SECTION       COLONOSCOPY         SOCIAL HISTORY  History   Smoking Status     Never Smoker   Smokeless Tobacco     Never Used      Alcohol use No    History   Drug Use No     FAMILY HISTORY  No family history of cancer or melanoma.    PHYSICAL EXAMINATION  /85 (BP Location: Right arm, Patient Position: Sitting)  Pulse 97  Temp 99  F (37.2  C) (Oral)  Ht 1.676 m (5' 5.98\")  Wt 97.8 kg (215 lb 11.2 oz)  LMP  (Approximate)  SpO2 99%  BMI 34.83 kg/m2  Wt Readings from Last 2 Encounters:   18 97.8 kg (215 lb 11.2 oz)   18 97.7 kg (215 lb 6.4 oz)     Physical Exam   Constitutional: She is oriented to person, place, and time. She appears well-developed and well-nourished.   HENT:   Mouth/Throat: Oropharynx is clear and moist.   Eyes: EOM are normal. Pupils are equal, round, and reactive to light. No scleral icterus.   Neck: Normal range of motion. Neck supple.   Cardiovascular: Regular rhythm.    Pulmonary/Chest: Effort normal and breath sounds normal. She has no wheezes.   Abdominal: Soft. She exhibits no mass.   Musculoskeletal: Normal range of motion. She exhibits no tenderness.   Lymphadenopathy:     She has no cervical adenopathy.   Neurological: She is alert and oriented to person, place, and time. No cranial nerve deficit.   Skin: Skin is warm and dry. No rash noted. No pallor.   Psychiatric: She has a normal mood and affect.   Nursing " note and vitals reviewed.      ASSESSMENT AND PLAN    #1 Clinical stage III melanoma, perianal primary  It was a pleasure to meet Ms. Tamez today. She is a 62 year old woman with a new diagnosis of perianal melanoma. The lesion had involved deep margin at 3.3 mm, so likely T3-T4 tumor. She has no clinically palpable lymph nodes, but has a 0.9 mm FDG avid lymph node in the drainage basin for the anus. I recommended she proceed with plan for wide local excision surgery and sentinel node biopsy with consideration of inguinal lymph node dissection.    We reviewed the lack of data for pre-operative immunotherapy in this context. We reviewed adjuvant checkpoint inhibitor therapy improves outcomes for patients with stage III melanoma. After complete resection 12-month RFS was 70.5% for nivolumab and 60.8% for ipilimumab in the Checkmate-238, versus 56% in the placebo group of the EORTC-02830 study.     We will also plan to review her case in melanoma tumor boards, and complete the staging evaluation with MRI brain.  FMLA form taken from patient to be filled and faxed/mailed to employer. Patient authorized me to send [facility] molecular studies and PD-L1 expression testing on the wide local specimen.     I will plan for her return in 4 weeks to review pathology and review follow-up and post-op immunotherapy options. Thank you for the consult..    Nigel Person M.D.   of Medicine  Hematology, Oncology and Transplantation      Malignant melanoma of anus (H)  - MR Brain w/o & w Contrast  - CBC with platelets differential  - Comprehensive metabolic panel  - Lactate Dehydrogenase    ADDENDUM: 4/9/18  Review of labs show normal BMP with slightly elevated chloride (111). Creatinine is normal. No liver function test abnormalities. CBC is normal and hemoglobin is 14 g/dL.      Again, thank you for allowing me to participate in the care of your patient.      Sincerely,    Nigel Valentine  MD

## 2018-04-05 NOTE — MR AVS SNAPSHOT
After Visit Summary   4/5/2018    Khloe Tamez    MRN: [id number]           Patient Information     Date Of Birth          1955        Visit Information        Provider Department      4/5/2018 3:00 PM Nigel Gautiher MD [facility]        Today's Diagnoses     Malignant melanoma of anus (H)    -  1      Care Instructions    Preventive Care:     Colorectal Cancer Screening: During our visit today, we discussed that it is recommended you receive colorectal cancer screening. Please call or make an appointment with your primary care provider to discuss this. You may also call the [facility] scheduling line ([phone]) to set up a colonoscopy appointment.            Follow-ups after your visit        Follow-up notes from your care team     Return in about 4 weeks (around 5/3/2018).      Your next 10 appointments already scheduled     Apr 13, 2018   Procedure with Ronald Wright MD   [facility] ([facility])    [address] [place] [address]  5th Floor  [facility] [address] [phone]           Located in the Clinics and Surgery Center at [address] [place] [address].   parking is very convenient and highly recommended.  is a $6 flat rate fee.  Both  and self parkers should enter the main arrival plaza from [facility]; parking attendants will direct you based on your parking preference.            May 03, 2018 11:00 AM CDT   (Arrive by 10:45 AM)   Return Visit with Nigel Valentine MD   [facility] ([facility])    [address] [place] [address]  [address]  [facility] [address] [phone]              Future tests that were ordered for you today     Open Future Orders        Priority Expected Expires Ordered    MR Brain w/o & w Contrast Routine  4/5/2019 4/5/2018            Who to contact     If you have questions or need follow up information  "about today's clinic visit or your schedule please contact [facility] directly at [phone].  Normal or non-critical lab and imaging results will be communicated to you by Swankhart, letter or phone within 4 business days after the clinic has received the results. If you do not hear from us within 7 days, please contact the clinic through Swankhart or phone. If you have a critical or abnormal lab result, we will notify you by phone as soon as possible.  Submit refill requests through INTTRA or call your pharmacy and they will forward the refill request to us. Please allow 3 business days for your refill to be completed.          Additional Information About Your Visit        SwankharFriendFeed Information     INTTRA lets you send messages to your doctor, view your test results, renew your prescriptions, schedule appointments and more. To sign up, go to [email or web address] . Click on \"Log in\" on the left side of the screen, which will take you to the Welcome page. Then click on \"Sign up Now\" on the right side of the page.     You will be asked to enter the access code listed below, as well as some personal information. Please follow the directions to create your username and password.     Your access code is: [email or web address]  Expires: 2018 11:36 AM     Your access code will  in 90 days. If you need help or a new code, please call your [facility] clinic or [phone].        Care EveryWhere ID     This is your Care EveryWhere ID. This could be used by other organizations to access your [facility] medical records  [id number]        Your Vitals Were     Pulse Temperature Height Last Period Pulse Oximetry BMI (Body Mass Index)    97 99  F (37.2  C) (Oral) 1.676 m (5' 5.98\") (Approximate) 99% 34.83 kg/m2       Blood Pressure from Last 3 Encounters:   18 143/85   18 144/82   18 (!) 155/91    Weight from Last 3 Encounters:   18 97.8 kg (215 lb 11.2 oz)   18 97.7 kg (215 lb " 6.4 oz)   04/03/18 97.7 kg (215 lb 4.8 oz)              We Performed the Following     CBC with platelets differential     Comprehensive metabolic panel     Lactate Dehydrogenase        Primary Care Provider Office Phone # Fax #    Aide Robertson [phone]       [facility] [place] [address] [address]        Equal Access to Services     [facility]: Hadii aad ku hadasho Soomaali, waaxda luqadaha, qaybta kaalmada adeegyada, waxay idiin hayaan adeeg kharash la'aan . So [facility] [phone].    ATENCIÓN: Si habla español, tiene a tim disposición servicios gratuitos de asistencia lingüística. Llame al [phone].    We comply with applicable federal civil rights laws and [place] laws. We do not discriminate on the basis of race, color, national origin, age, disability, sex, sexual orientation, or gender identity.            Thank you!     Thank you for choosing [facility]  for your care. Our goal is always to provide you with excellent care. Hearing back from our patients is one way we can continue to improve our services. Please take a few minutes to complete the written survey that you may receive in the mail after your visit with us. Thank you!             Your Updated Medication List - Protect others around you: Learn how to safely use, store and throw away your medicines at www.disposemymeds.org.          This list is accurate as of 4/5/18  4:30 PM.  Always use your most recent med list.                   Brand Name Dispense Instructions for use Diagnosis    ALEVE PO      Take 220 mg by mouth every 12 hours        calcium carbonate 1250 MG tablet    OS-JANAY 500 mg [place]. Ca     Take 1 tablet by mouth every evening        MAGNESIUM PO      Take by mouth At Bedtime        TEA TREE EX      BODY WASH        VITAMIN D (CHOLECALCIFEROL) PO      Take by mouth every evening

## 2018-04-05 NOTE — PATIENT INSTRUCTIONS
Preventive Care:     Colorectal Cancer Screening: During our visit today, we discussed that it is recommended you receive colorectal cancer screening. Please call or make an appointment with your primary care provider to discuss this. You may also call the [facility] scheduling line ([phone]) to set up a colonoscopy appointment.    
- - -

## 2018-04-10 DIAGNOSIS — C43.51: Primary | ICD-10-CM

## 2018-04-10 PROCEDURE — 00000346 ZZHCL STATISTIC REVIEW OUTSIDE SLIDES TC 88321: Performed by: INTERNAL MEDICINE

## 2018-04-10 NOTE — PROGRESS NOTES
This RNCC called to inform the patient that Dr. Wright would like the patient to see a Genetic Counselor given her family history of cancer.     Patient has a [employer] Counselor Chely Rai that patient would like use to discuss referrals and orders with to make sure they are covered by the patient's insurance.

## 2018-04-11 ENCOUNTER — DOCUMENTATION ONLY (OUTPATIENT)
Dept: ONCOLOGY | Facility: CLINIC | Age: 63
End: 2018-04-11

## 2018-04-11 ENCOUNTER — MYC MEDICAL ADVICE (OUTPATIENT)
Dept: SURGERY | Facility: CLINIC | Age: 63
End: 2018-04-11

## 2018-04-11 NOTE — PROGRESS NOTES
Form Request Documentation    Date Received in Clinic:  4/5/18  Name/Type of Form: FMLA  Questions that need to be addressed:   Current Employment Status: currently working on a full time basis, taking time off for surgery from 4/10/18 - 2/4 weeks post op   Amount of Leave Requested: Intermittent Leave beginning 4/10/18, may need continuous leave pending pathology results from surgery on 4/13/18    Other: None  Date Completed: 4/11/2018  Copy Mailed to patient: Yes on 4/11/2018  Disposition of Form: Fax to  Dept, Attn: Esthela at [phone]

## 2018-04-12 NOTE — TELEPHONE ENCOUNTER
This Rn called the patient to discuss her my chart questions. All questions were answered and patient verbalized understanding of plan of care.

## 2018-04-13 ENCOUNTER — ANESTHESIA (OUTPATIENT)
Dept: SURGERY | Facility: AMBULATORY SURGERY CENTER | Age: 63
End: 2018-04-13

## 2018-04-13 ENCOUNTER — SURGERY (OUTPATIENT)
Age: 63
End: 2018-04-13

## 2018-04-13 ENCOUNTER — HOSPITAL ENCOUNTER (OUTPATIENT)
Facility: AMBULATORY SURGERY CENTER | Age: 63
End: 2018-04-13
Attending: COLON & RECTAL SURGERY
Payer: COMMERCIAL

## 2018-04-13 VITALS
TEMPERATURE: 97.5 F | WEIGHT: 216.11 LBS | DIASTOLIC BLOOD PRESSURE: 89 MMHG | HEIGHT: 66 IN | OXYGEN SATURATION: 99 % | BODY MASS INDEX: 34.73 KG/M2 | RESPIRATION RATE: 16 BRPM | SYSTOLIC BLOOD PRESSURE: 127 MMHG

## 2018-04-13 DIAGNOSIS — C43.9 MALIGNANT MELANOMA, UNSPECIFIED SITE (H): Primary | ICD-10-CM

## 2018-04-13 LAB — COPATH REPORT: NORMAL

## 2018-04-13 RX ORDER — ONDANSETRON 4 MG/1
4 TABLET, ORALLY DISINTEGRATING ORAL EVERY 30 MIN PRN
Status: DISCONTINUED | OUTPATIENT
Start: 2018-04-13 | End: 2018-04-14 | Stop reason: HOSPADM

## 2018-04-13 RX ORDER — PROPOFOL 10 MG/ML
INJECTION, EMULSION INTRAVENOUS CONTINUOUS PRN
Status: DISCONTINUED | OUTPATIENT
Start: 2018-04-13 | End: 2018-04-13

## 2018-04-13 RX ORDER — PROPOFOL 10 MG/ML
INJECTION, EMULSION INTRAVENOUS PRN
Status: DISCONTINUED | OUTPATIENT
Start: 2018-04-13 | End: 2018-04-13

## 2018-04-13 RX ORDER — ACETAMINOPHEN 325 MG/1
975 TABLET ORAL ONCE
Status: DISCONTINUED | OUTPATIENT
Start: 2018-04-13 | End: 2018-04-13 | Stop reason: HOSPADM

## 2018-04-13 RX ORDER — FENTANYL CITRATE 50 UG/ML
25-50 INJECTION, SOLUTION INTRAMUSCULAR; INTRAVENOUS
Status: DISCONTINUED | OUTPATIENT
Start: 2018-04-13 | End: 2018-04-14 | Stop reason: HOSPADM

## 2018-04-13 RX ORDER — ONDANSETRON 2 MG/ML
4 INJECTION INTRAMUSCULAR; INTRAVENOUS EVERY 30 MIN PRN
Status: DISCONTINUED | OUTPATIENT
Start: 2018-04-13 | End: 2018-04-14 | Stop reason: HOSPADM

## 2018-04-13 RX ORDER — ONDANSETRON 4 MG/1
4 TABLET, ORALLY DISINTEGRATING ORAL
Status: DISCONTINUED | OUTPATIENT
Start: 2018-04-13 | End: 2018-04-14 | Stop reason: HOSPADM

## 2018-04-13 RX ORDER — BUPIVACAINE HYDROCHLORIDE AND EPINEPHRINE 2.5; 5 MG/ML; UG/ML
INJECTION, SOLUTION INFILTRATION; PERINEURAL PRN
Status: DISCONTINUED | OUTPATIENT
Start: 2018-04-13 | End: 2018-04-13 | Stop reason: HOSPADM

## 2018-04-13 RX ORDER — NALOXONE HYDROCHLORIDE 0.4 MG/ML
.1-.4 INJECTION, SOLUTION INTRAMUSCULAR; INTRAVENOUS; SUBCUTANEOUS
Status: DISCONTINUED | OUTPATIENT
Start: 2018-04-13 | End: 2018-04-14 | Stop reason: HOSPADM

## 2018-04-13 RX ORDER — FENTANYL CITRATE 50 UG/ML
25-50 INJECTION, SOLUTION INTRAMUSCULAR; INTRAVENOUS
Status: DISCONTINUED | OUTPATIENT
Start: 2018-04-13 | End: 2018-04-13 | Stop reason: HOSPADM

## 2018-04-13 RX ORDER — MEPERIDINE HYDROCHLORIDE 25 MG/ML
12.5 INJECTION INTRAMUSCULAR; INTRAVENOUS; SUBCUTANEOUS
Status: DISCONTINUED | OUTPATIENT
Start: 2018-04-13 | End: 2018-04-14 | Stop reason: HOSPADM

## 2018-04-13 RX ORDER — GABAPENTIN 300 MG/1
300 CAPSULE ORAL ONCE
Status: COMPLETED | OUTPATIENT
Start: 2018-04-13 | End: 2018-04-13

## 2018-04-13 RX ORDER — DEXAMETHASONE SODIUM PHOSPHATE 10 MG/ML
INJECTION, SOLUTION INTRAMUSCULAR; INTRAVENOUS PRN
Status: DISCONTINUED | OUTPATIENT
Start: 2018-04-13 | End: 2018-04-13

## 2018-04-13 RX ORDER — CEFOTETAN DISODIUM 2 G/20ML
2 INJECTION, POWDER, FOR SOLUTION INTRAMUSCULAR; INTRAVENOUS
Status: COMPLETED | OUTPATIENT
Start: 2018-04-13 | End: 2018-04-13

## 2018-04-13 RX ORDER — CEFOTETAN DISODIUM 2 G/20ML
2 INJECTION, POWDER, FOR SOLUTION INTRAMUSCULAR; INTRAVENOUS SEE ADMIN INSTRUCTIONS
Status: DISCONTINUED | OUTPATIENT
Start: 2018-04-13 | End: 2018-04-13 | Stop reason: HOSPADM

## 2018-04-13 RX ORDER — OXYCODONE HYDROCHLORIDE 5 MG/1
5 TABLET ORAL EVERY 4 HOURS PRN
Status: DISCONTINUED | OUTPATIENT
Start: 2018-04-13 | End: 2018-04-14 | Stop reason: HOSPADM

## 2018-04-13 RX ORDER — NAPROXEN 500 MG/1
500 TABLET ORAL 2 TIMES DAILY WITH MEALS
Qty: 60 TABLET | Refills: 0 | Status: SHIPPED | OUTPATIENT
Start: 2018-04-13 | End: 2018-01-01

## 2018-04-13 RX ORDER — OXYCODONE HYDROCHLORIDE 5 MG/1
5-10 TABLET ORAL
Qty: 30 TABLET | Refills: 0 | Status: SHIPPED | OUTPATIENT
Start: 2018-04-13 | End: 2018-01-01

## 2018-04-13 RX ORDER — LIDOCAINE 40 MG/G
CREAM TOPICAL
Status: DISCONTINUED | OUTPATIENT
Start: 2018-04-13 | End: 2018-04-13 | Stop reason: HOSPADM

## 2018-04-13 RX ORDER — ACETAMINOPHEN 325 MG/1
975 TABLET ORAL ONCE
Status: COMPLETED | OUTPATIENT
Start: 2018-04-13 | End: 2018-04-13

## 2018-04-13 RX ORDER — FENTANYL CITRATE 50 UG/ML
INJECTION, SOLUTION INTRAMUSCULAR; INTRAVENOUS PRN
Status: DISCONTINUED | OUTPATIENT
Start: 2018-04-13 | End: 2018-04-13

## 2018-04-13 RX ORDER — SODIUM CHLORIDE, SODIUM LACTATE, POTASSIUM CHLORIDE, CALCIUM CHLORIDE 600; 310; 30; 20 MG/100ML; MG/100ML; MG/100ML; MG/100ML
INJECTION, SOLUTION INTRAVENOUS CONTINUOUS
Status: DISCONTINUED | OUTPATIENT
Start: 2018-04-13 | End: 2018-04-14 | Stop reason: HOSPADM

## 2018-04-13 RX ORDER — LIDOCAINE HYDROCHLORIDE 20 MG/ML
INJECTION, SOLUTION INFILTRATION; PERINEURAL PRN
Status: DISCONTINUED | OUTPATIENT
Start: 2018-04-13 | End: 2018-04-13

## 2018-04-13 RX ORDER — SODIUM CHLORIDE, SODIUM LACTATE, POTASSIUM CHLORIDE, CALCIUM CHLORIDE 600; 310; 30; 20 MG/100ML; MG/100ML; MG/100ML; MG/100ML
INJECTION, SOLUTION INTRAVENOUS CONTINUOUS
Status: DISCONTINUED | OUTPATIENT
Start: 2018-04-13 | End: 2018-04-13 | Stop reason: HOSPADM

## 2018-04-13 RX ADMIN — DEXAMETHASONE SODIUM PHOSPHATE 4 MG: 10 INJECTION, SOLUTION INTRAMUSCULAR; INTRAVENOUS at 14:17

## 2018-04-13 RX ADMIN — Medication 100 MCG: at 14:19

## 2018-04-13 RX ADMIN — Medication 40 MG: at 14:02

## 2018-04-13 RX ADMIN — ONDANSETRON 4 MG: 2 INJECTION INTRAMUSCULAR; INTRAVENOUS at 15:00

## 2018-04-13 RX ADMIN — Medication 100 MCG: at 14:38

## 2018-04-13 RX ADMIN — LIDOCAINE HYDROCHLORIDE 100 MG: 20 INJECTION, SOLUTION INFILTRATION; PERINEURAL at 14:02

## 2018-04-13 RX ADMIN — ACETAMINOPHEN 975 MG: 325 TABLET ORAL at 12:19

## 2018-04-13 RX ADMIN — Medication 100 MCG: at 14:34

## 2018-04-13 RX ADMIN — Medication 100 MCG: at 14:31

## 2018-04-13 RX ADMIN — Medication 100 MCG: at 14:28

## 2018-04-13 RX ADMIN — PROPOFOL 200 MG: 10 INJECTION, EMULSION INTRAVENOUS at 14:02

## 2018-04-13 RX ADMIN — GABAPENTIN 300 MG: 300 CAPSULE ORAL at 12:19

## 2018-04-13 RX ADMIN — CEFOTETAN DISODIUM 2 G: 2 INJECTION, POWDER, FOR SOLUTION INTRAMUSCULAR; INTRAVENOUS at 13:57

## 2018-04-13 RX ADMIN — PROPOFOL 150 MCG/KG/MIN: 10 INJECTION, EMULSION INTRAVENOUS at 14:02

## 2018-04-13 RX ADMIN — BUPIVACAINE HYDROCHLORIDE AND EPINEPHRINE 30 ML: 2.5; 5 INJECTION, SOLUTION INFILTRATION; PERINEURAL at 14:30

## 2018-04-13 RX ADMIN — SODIUM CHLORIDE, SODIUM LACTATE, POTASSIUM CHLORIDE, CALCIUM CHLORIDE: 600; 310; 30; 20 INJECTION, SOLUTION INTRAVENOUS at 12:21

## 2018-04-13 RX ADMIN — FENTANYL CITRATE 50 MCG: 50 INJECTION, SOLUTION INTRAMUSCULAR; INTRAVENOUS at 14:02

## 2018-04-13 NOTE — DISCHARGE INSTRUCTIONS
"[facility]  Home Care Following Anesthesia  For 24 hours after surgery:  1. Get plenty of rest.  A responsible adult must stay with you for at least 24 hours after you leave the surgery center.  2. Do not drive or use heavy equipment.  If you have weakness or tingling, don't drive or use heavy equipment until this feeling goes away.   3. Do not drink alcohol.   4. Avoid strenuous or risky activities.  Ask for help when climbing stairs.  5. You may feel lightheaded.  IF so, sit for a few minutes before standing.  Have someone help you get up.   6. If you have nausea (feel sick to your stomach): Drink only clear liquids such as apple juice, ginger ale, broth or 7-Up.  Rest may also help.  Be sure to drink enough fluids.  Move to a regular diet as you feel able.   7. You may have a slight fever.  Call the doctor if your fever is over 100 F (37.7 C) (taken under the tongue) or lasts longer than 24 hours.  8. You may have a dry mouth, a sore throat, muscle aches or trouble sleeping. These should go away after 24 hours.  9. Do not make important or legal decisions.        Today you received a Marcaine or bupivacaine block to numb the nerves near your surgery site.  This is a block using local anesthetic or \"numbing\" medication injected around the nerves to anesthetize or \"numb\" the area supplied by those nerves.  This block is injected into the muscle layer near your surgical site.  The medication may numb the location where you had surgery for 6-18 hours, but may last up to 24 hours.  If your surgical site is an arm or leg you should be careful with your affected limb, since it is possible to injure your limb without being aware of it due to the numbing.  Until full feeling returns, you should guard against bumping or hitting your limb, and avoid extreme hot or cold temperatures on the skin.  As the block wears off, the feeling will return as a tingling or prickly sensation near your " surgical site.  You will experience more discomfort from your incision as the feeling returns.  You may want to take a pain pill (a narcotic or Tylenol if this was prescribed by your surgeon) when you start to experience mild pain before the pain beccomes more severe.  If your pain medications do not control your pain you should notifiy your surgeon.    Tips for taking pain medications  To get the best pain relief possible, remember these points:    Take pain medications as directed, before pain becomes severe.    Pain medication can upset your stomach: taking it with food may help.    Constipation is a common side effect of pain medication. Drink plenty of  fluids.    Eat foods high in fiber. Take a stool softener if recommended by your doctor or pharmacist.    Do not drink alcohol, drive or operate machinery while taking pain medications.    Ask about other ways to control pain, such as with heat, ice or relaxation.    Tylenol/Acetaminophen Consumption  To help encourage the safe use of acetaminophen, the makers of TYLENOL  have lowered the maximum daily dose for single-ingredient Extra Strength TYLENOL  (acetaminophen) products sold in the [place] from 8 pills per day (4,000 mg) to 6 pills per day (3,000 mg). The dosing interval has also changed from 2 pills every 4-6 hours to 2 pills every 6 hours.    If you feel your pain relief is insufficient, you may take Tylenol/Acetaminophen in addition to your narcotic pain medication.     Be careful not to exceed 3,000 mg of Tylenol/Acetaminophen in a 24 hour period from all sources.    If you are taking extra strength Tylenol/acetaminophen (500 mg), the maximum dose is 6 tablets in 24 hours.    If you are taking regular strength acetaminophen (325 mg), the maximum dose is 9 tablets in 24 hours.    Call a doctor for any of the followin. Signs of infection (fever, growing tenderness at the surgery site, a large amount of drainage or bleeding, severe pain, foul-smelling  drainage, redness, swelling).  2. It has been over 8 to 10 hours since surgery and you are still not able to urinate (pass water).  3. Headache for over 24 hours.  Your doctor is: Dr. Ronald Wright, Colon Rectal: [phone]               Or dial [phone] and ask for the resident on call for:  Colon Rectal  For emergency care, call the:  [facility]:  [phone] (TTY for hearing impaired: [phone])    No soaking in tub for longer than five minutes.   Avoid constipation, take stool softeners to avoid constipation.   Drink plenty of fluids, will see Dr. Wright in clinic.   May fold gauze and place in rectal area for comfort and to absorb any drainage.

## 2018-04-13 NOTE — IP AVS SNAPSHOT
MRN:[id number]                      After Visit Summary   4/13/2018    Khloe Tamez    MRN: [id number]           Thank you!     Thank you for choosing [facility] for your care. Our goal is always to provide you with excellent care. Hearing back from our patients is one way we can continue to improve our services. Please take a few minutes to complete the written survey that you may receive in the mail after you visit with us. Thank you!        Patient Information     Date Of Birth          1955        About your hospital stay     You were admitted on:  April 13, 2018 You last received care in the:  [facility]    You were discharged on:  April 13, 2018       Who to Call     For medical emergencies, please call 911.  For non-urgent questions about your medical care, please call your primary care provider or clinic, [phone]  For questions related to your surgery, please call your surgery clinic        Attending Provider     Provider Specialty    Ronald Wright MD Colon and Rectal Surgery       Primary Care Provider Office Phone # Fax #    Aide Robertson [phone]      Your next 10 appointments already scheduled     May 03, 2018  7:00 AM CDT   (Arrive by 6:45 AM)   MR BRAIN W/O & W CONTRAST with [address] [facility] ([facility])    [address] [place] [address] [phone]           Take your medicines as usual, unless your doctor tells you not to. Bring a list of your current medicines to your exam (including vitamins, minerals and over-the-counter drugs).  You may or may not receive intravenous (IV) contrast for this exam pending the discretion of the Radiologist.  You do not need to do anything special to prepare.  The MRI machine uses a strong magnet. Please wear clothes without metal (snaps, zippers). A sweatsuit works well, or we may give you a hospital gown.  Please  remove any body piercings and hair extensions before you arrive. You will also remove watches, jewelry, hairpins, wallets, dentures, partial dental plates and hearing aids. You may wear contact lenses, and you may be able to wear your rings. We have a safe place to keep your personal items, but it is safer to leave them at home.  **IMPORTANT** THE INSTRUCTIONS BELOW ARE ONLY FOR THOSE PATIENTS WHO HAVE BEEN PRESCRIBED SEDATION OR GENERAL ANESTHESIA DURING THEIR MRI PROCEDURE:  IF YOUR DOCTOR PRESCRIBED ORAL SEDATION (take medicine to help you relax during your exam):   You must get the medicine from your doctor (oral medication) before you arrive. Bring the medicine to the exam. Do not take it at home. You ll be told when to take it upon arriving for your exam.   Arrive one hour early. Bring someone who can take you home after the test. Your medicine will make you sleepy. After the exam, you may not drive, take a bus or take a taxi by yourself.  IF YOUR DOCTOR PRESCRIBED IV SEDATION:   Arrive one hour early. Bring someone who can take you home after the test. Your medicine will make you sleepy. After the exam, you may not drive, take a bus or take a taxi by yourself.   No eating 6 hours before your exam. You may have clear liquids up until 4 hours before your exam. (Clear liquids include water, clear tea, black coffee and fruit juice without pulp.)  IF YOUR DOCTOR PRESCRIBED ANESTHESIA (be asleep for your exam):   Arrive 1 1/2 hours early. Bring someone who can take you home after the test. You may not drive, take a bus or take a taxi by yourself.   No eating 8 hours before your exam. You may have clear liquids up until 4 hours before your exam. (Clear liquids include water, clear tea, black coffee and fruit juice without pulp.)   You will spend four to five hours in the recovery room.  Please call the Imaging Department at your exam site with any questions.            May 03, 2018 11:00 AM CDT   (Arrive by 10:45  "AM)   Return Visit with Nigel Valentine MD   [facility] ([facility])    [address]  [address]  [facility] [address] [phone]              Further instructions from your care team       [facility]  Home Care Following Anesthesia  For 24 hours after surgery:  1. Get plenty of rest.  A responsible adult must stay with you for at least 24 hours after you leave the surgery center.  2. Do not drive or use heavy equipment.  If you have weakness or tingling, don't drive or use heavy equipment until this feeling goes away.   3. Do not drink alcohol.   4. Avoid strenuous or risky activities.  Ask for help when climbing stairs.  5. You may feel lightheaded.  IF so, sit for a few minutes before standing.  Have someone help you get up.   6. If you have nausea (feel sick to your stomach): Drink only clear liquids such as apple juice, ginger ale, broth or 7-Up.  Rest may also help.  Be sure to drink enough fluids.  Move to a regular diet as you feel able.   7. You may have a slight fever.  Call the doctor if your fever is over 100 F (37.7 C) (taken under the tongue) or lasts longer than 24 hours.  8. You may have a dry mouth, a sore throat, muscle aches or trouble sleeping. These should go away after 24 hours.  9. Do not make important or legal decisions.        Today you received a Marcaine or bupivacaine block to numb the nerves near your surgery site.  This is a block using local anesthetic or \"numbing\" medication injected around the nerves to anesthetize or \"numb\" the area supplied by those nerves.  This block is injected into the muscle layer near your surgical site.  The medication may numb the location where you had surgery for 6-18 hours, but may last up to 24 hours.  If your surgical site is an arm or leg you should be careful with your affected limb, since it is possible to injure your limb without being aware " of it due to the numbing.  Until full feeling returns, you should guard against bumping or hitting your limb, and avoid extreme hot or cold temperatures on the skin.  As the block wears off, the feeling will return as a tingling or prickly sensation near your surgical site.  You will experience more discomfort from your incision as the feeling returns.  You may want to take a pain pill (a narcotic or Tylenol if this was prescribed by your surgeon) when you start to experience mild pain before the pain beccomes more severe.  If your pain medications do not control your pain you should notifiy your surgeon.    Tips for taking pain medications  To get the best pain relief possible, remember these points:    Take pain medications as directed, before pain becomes severe.    Pain medication can upset your stomach: taking it with food may help.    Constipation is a common side effect of pain medication. Drink plenty of  fluids.    Eat foods high in fiber. Take a stool softener if recommended by your doctor or pharmacist.    Do not drink alcohol, drive or operate machinery while taking pain medications.    Ask about other ways to control pain, such as with heat, ice or relaxation.    Tylenol/Acetaminophen Consumption  To help encourage the safe use of acetaminophen, the makers of TYLENOL  have lowered the maximum daily dose for single-ingredient Extra Strength TYLENOL  (acetaminophen) products sold in the [place] from 8 pills per day (4,000 mg) to 6 pills per day (3,000 mg). The dosing interval has also changed from 2 pills every 4-6 hours to 2 pills every 6 hours.    If you feel your pain relief is insufficient, you may take Tylenol/Acetaminophen in addition to your narcotic pain medication.     Be careful not to exceed 3,000 mg of Tylenol/Acetaminophen in a 24 hour period from all sources.    If you are taking extra strength Tylenol/acetaminophen (500 mg), the maximum dose is 6 tablets in 24 hours.    If you are taking  "regular strength acetaminophen (325 mg), the maximum dose is 9 tablets in 24 hours.    Call a doctor for any of the followin. Signs of infection (fever, growing tenderness at the surgery site, a large amount of drainage or bleeding, severe pain, foul-smelling drainage, redness, swelling).  2. It has been over 8 to 10 hours since surgery and you are still not able to urinate (pass water).  3. Headache for over 24 hours.  Your doctor is: Dr. Ronald Wright, Colon Rectal: [phone]               Or dial [phone] and ask for the resident on call for:  Colon Rectal  For emergency care, call the:  [facility]:  [phone] (TTY for hearing impaired: [phone])    No soaking in tub for longer than five minutes.   Avoid constipation, take stool softeners to avoid constipation.   Drink plenty of fluids, will see Dr. Wright in clinic.   May fold gauze and place in rectal area for comfort and to absorb any drainage.            Additional Information     If you use hormonal birth control (such as the pill, patch, ring or implants): You'll need a second form of birth control for 7 days (condoms, a diaphragm or contraceptive foam). While in the hospital, you received a medicine called Bridion. Your normal birth control will not work as well for a week after taking this medicine.          Pending Results     Date and Time Order Name Status Description    2018 1434 Surgical pathology exam In process             Admission Information     Date & Time Provider Department Dept. Phone    2018 Ronald Wright MD [facility] [phone]      Your Vitals Were     Blood Pressure Temperature Respirations Height Weight Pulse Oximetry    127/89 97.5  F (36.4  C) (Temporal) 16 1.676 m (5' 5.98\") 98 kg (216 lb 1.8 oz) 99%    BMI (Body Mass Index)                   34.9 kg/m2           MyChart Information     [employer] gives you secure access to your electronic health record. If " you see a primary care provider, you can also send messages to your care team and make appointments. If you have questions, please call your primary care clinic.  If you do not have a primary care provider, please call [phone] and they will assist you.      [employer] is an electronic gateway that provides easy, online access to your medical records. With [employer], you can request a clinic appointment, read your test results, renew a prescription or communicate with your care team.     To access your existing account, please contact your [facility] or call [phone] for assistance.        Care EveryWhere ID     This is your Care EveryWhere ID. This could be used by other organizations to access your [facility] medical records  [id number]        Equal Access to Services     SHELIA WALKER : Dajuan Cox, karina gallardo, zac lara, silvia guzmán. So [facility] [phone].    ATENCIÓN: Si habla español, tiene a tim disposición servicios gratuitos de asistencia lingüística. Llame al [phone].    We comply with applicable federal civil rights laws and [place] laws. We do not discriminate on the basis of race, color, national origin, age, disability, sex, sexual orientation, or gender identity.               Review of your medicines      START taking        Dose / Directions    naproxen 500 MG tablet   Commonly known as:  NAPROSYN   Used for:  Malignant melanoma, unspecified site (H)        Dose:  500 mg   Take 1 tablet (500 mg) by mouth 2 times daily (with meals) for 7-10 days.   Quantity:  60 tablet   Refills:  0       oxyCODONE IR 5 MG tablet   Commonly known as:  ROXICODONE   Used for:  Malignant melanoma, unspecified site (H)        Dose:  5-10 mg   Take 1-2 tablets (5-10 mg) by mouth every 3 hours as needed for severe pain   Quantity:  30 tablet   Refills:  0         CONTINUE these medicines which have NOT CHANGED         Dose / Directions    ALEVE PO        Dose:  220 mg   Take 220 mg by mouth every 12 hours   Refills:  0       calcium carbonate 1250 MG tablet   Commonly known as:  OS-JANAY 500 mg [place]. [place]        Dose:  1 tablet   Take 1 tablet by mouth every evening   Refills:  0       MAGNESIUM PO        Take by mouth At Bedtime   Refills:  0       TEA TREE EX        BODY WASH   Refills:  0       VITAMIN D (CHOLECALCIFEROL) PO        Take by mouth every evening   Refills:  0            Where to get your medicines      Some of these will need a paper prescription and others can be bought over the counter. Ask your nurse if you have questions.     Bring a paper prescription for each of these medications     naproxen 500 MG tablet    oxyCODONE IR 5 MG tablet                Protect others around you: Learn how to safely use, store and throw away your medicines at www.disposemymeds.org.        Information about OPIOIDS     PRESCRIPTION OPIOIDS: WHAT YOU NEED TO KNOW    Prescription opioids can be used to help relieve moderate to severe pain and are often prescribed following a surgery or injury, or for certain health conditions. These medications can be an important part of treatment but also come with serious risks. It is important to work with your health care provider to make sure you are getting the safest, most effective care.    WHAT ARE THE RISKS AND SIDE EFFECTS OF OPIOID USE?  Prescription opioids carry serious risks of addiction and overdose, especially with prolonged use. An opioid overdose, often marked by slowed breathing can cause sudden death. The use of prescription opioids can have a number of side effects as well, even when taken as directed:      Tolerance - meaning you might need to take more of a medication for the same pain relief    Physical dependence - meaning you have symptoms of withdrawal when a medication is stopped    Increased sensitivity to pain    Constipation    Nausea, vomiting, and dry  mouth    Sleepiness and dizziness    Confusion    Depression    Low levels of testosterone that can result in lower sex drive, energy, and strength    Itching and sweating    RISKS ARE GREATER WITH:    History of drug misuse, substance use disorder, or overdose    Mental health conditions (such as depression or anxiety)    Sleep apnea    Older age (65 years or older)    Pregnancy    Avoid alcohol while taking prescription opioids.   Also, unless specifically advised by your health care provider, medications to avoid include:    Benzodiazepines (such as Xanax or Valium)    Muscle relaxants (such as Soma or Flexeril)    Hypnotics (such as Ambien or Lunesta)    Other prescription opioids    KNOW YOUR OPTIONS:  Talk to your health care provider about ways to manage your pain that do not involve prescription opioids. Some of these options may actually work better and have fewer risks and side effects:    Pain relievers such as acetaminophen, ibuprofen, and naproxen    Some medications that are also used for depression or seizures    Physical therapy and exercise    Cognitive behavioral therapy, a psychological, goal-directed approach, in which patients learn how to modify physical, behavioral, and emotional triggers of pain and stress    IF YOU ARE PRESCRIBED OPIOIDS FOR PAIN:    Never take opioids in greater amounts or more often than prescribed    Follow up with your primary health care provider and work together to create a plan on how to manage your pain.    Talk about ways to help manage your pain that do not involve prescription opioids    Talk about all concerns and side effects    Help prevent misuse and abuse    Never sell or share prescription opioids    Never use another person's prescription opioids    Store prescription opioids in a secure place and out of reach of others (this may include visitors, children, friends, and family)    Visit www.cdc.gov/drugoverdose to learn about risks of opioid abuse and  overdose    If you believe you may be struggling with addiction, tell your health care provider and ask for guidance or call [facility]'s National Helpline at 2-776-326-HELP    LEARN MORE / www.cdc.gov/drugoverdose/prescribing/guideline.html    Safely dispose of unused prescription opioids: Find your local drug take-back programs and more information about the importance of safe disposal at www.doseofreality.mn.gov             Medication List: This is a list of all your medications and when to take them. Check marks below indicate your daily home schedule. Keep this list as a reference.      Medications           Morning Afternoon Evening Bedtime As Needed    ALEVE PO   Take 220 mg by mouth every 12 hours                                calcium carbonate 1250 MG tablet   Commonly known as:  OS-JANAY 500 mg [place]. [place]   Take 1 tablet by mouth every evening                                MAGNESIUM PO   Take by mouth At Bedtime                                naproxen 500 MG tablet   Commonly known as:  NAPROSYN   Take 1 tablet (500 mg) by mouth 2 times daily (with meals) for 7-10 days.                                oxyCODONE IR 5 MG tablet   Commonly known as:  ROXICODONE   Take 1-2 tablets (5-10 mg) by mouth every 3 hours as needed for severe pain                                TEA TREE EX   BODY WASH                                VITAMIN D (CHOLECALCIFEROL) PO   Take by mouth every evening

## 2018-04-13 NOTE — OP NOTE
Procedure Date: 04/13/2018      PREOPERATIVE DIAGNOSIS:  Perianal melanoma.      POSTOPERATIVE DIAGNOSIS:  Perianal melanoma.      PROCEDURE:  Wide local excision of a 2 x 2.5 cm perianal melanoma with anaplastic closure.      HISTORY:  This 62-year-old woman underwent a biopsy of an enlarging perianal lesion and was found to have a melanoma with a depth of invasion of 3.5 mm.  PET-CT showed a faintly positive unilateral groin node.  The patient was seen in consultation by Dr. Valentine, and we agreed to proceed with an initial wide excision followed by immunotherapy.  There was an open question as to whether or not a sentinel lymph node biopsy or groin dissection would be useful.  We discussed the risks and alternatives to surgery in detail.  I answered all the patient's questions to her stated satisfaction.  She expressed her understanding and provided written informed consent.      SURGEON:  Ronald Wright MD      ASSISTANTS:  Sedrick Brito MD, and Leobardo Gasca MD      DESCRIPTION OF PROCEDURE:  After induction of adequate endotracheal anesthesia, the patient was placed in the prone jackknife position, and the buttocks were taped apart.  The perianal skin was prepped and draped in a sterile fashion.  A time-out was performed and the patient and procedure confirmed.  Examination under anesthesia demonstrated a normal anal canal on digital examination and on anoscopy.  There was a 2 x 2.5 cm, exophytic, pigmented lesion in the right posterolateral position adjacent to the anal canal, but not impinging into the anal canal.  I marked out a 1 cm resection margin around the tumor and planned a full-thickness excision oriented in the posterolateral direction, so the excision was actually ellipsoid in shape and measured approximately 6 cm in length.  The skin was incised and taken down to the subcutaneous tissues.  As we worked medially, we stayed in the dissection plane just above the superficial external sphincter muscle,  which we preserved.  In the medial-most part of the dissection, in order to ensure a free margin, we resected a small amount of the distal-most internal anal sphincter.  The tissue was pinned out on a board in the operating room and was marked as follows:  Single purple stitch--left posterolateral margin, single white stitch--medial margin, double white stitch-- anterior margin, double purple stitch--posterior margin.  Beneath, there was some residual soft tissue above the sphincter mechanism underneath the medial margin melanoma, and this was excised separately and sent in a separate jar as deep margin.  We next performed a plastic reconstruction.  We first reapproximated the distal internal anal sphincter with 3-0 Vicryls.  The external sphincter was imbricated to help bring the wound edges together with 3-0 Vicryl.  The dermis was reapproximated with interrupted 3-0 Vicryls.  We then closed the skin with interrupted 4-0 Monocryl mattress sutures followed by Dermabond.  A fluff dressing was applied and the procedure terminated.      Estimated blood loss was less than 5 mL.  The patient tolerated the procedure well and without evident complications.  Sponge, needle and instrument counts were reported as correct at the conclusion of the case x2.         LEA DMAON MD             D: 2018   T: 2018   MT: [email or web address]      Name:     ROXANNA ASTUDILLO   MRN:      [id number]        Account:        [id number]   :      1955           Procedure Date: 2018      Document: [id number]       cc: SB Robertson MD       [facility] [place] MUÑIZ MD

## 2018-04-13 NOTE — BRIEF OP NOTE
[facility]    Brief Operative Note    Pre-operative diagnosis: Perianal Melanoma  Post-operative diagnosis Same  Procedure: Procedure(s):  Examination Under Anesthesia Anus, Local Excision of Perianal Melanoma   - Wound Class: II-Clean Contaminated  Surgeon: Surgeon(s) and Role:     * Ronald Wright MD - Primary     * Sedrick Brito MD - Assisting     * Leobardo Gasca MD - Resident, Assisting  Anesthesia: General  Estimated blood loss: Minimal  Drains: None  Specimens:   ID Type Source Tests Collected by Time Destination   A : Deep Margin Tissue Anus SURGICAL PATHOLOGY EXAM Ronald Wright MD 4/13/2018  2:31 PM    B : Left Posterior-Lateral Liana-anal Melanoma Suture Single Purple Left Posterior, Single White Medial, Double White Anterior, Double Purple Posterior Tissue Anus SURGICAL PATHOLOGY EXAM Ronald Wright MD 4/13/2018  2:32 PM      Findings:   4x12 cm complex wound closure, excision with 1 cm margins  Complications: None.  Implants: None.

## 2018-04-13 NOTE — ANESTHESIA POSTPROCEDURE EVALUATION
Patient: Khloe Tamez    Procedure(s):  Examination Under Anesthesia Anus, Local Excision of Perianal Melanoma   - Wound Class: II-Clean Contaminated    Diagnosis:Perianal Melanoma  Diagnosis Additional Information: No value filed.    Anesthesia Type:  MAC    Note:  Anesthesia Post Evaluation    Patient location during evaluation: PACU  Patient participation: Able to fully participate in evaluation  Level of consciousness: awake  Pain management: adequate  Airway patency: patent  Cardiovascular status: acceptable  Respiratory status: acceptable  Hydration status: acceptable  PONV: none     Anesthetic complications: None          Last vitals:  Vitals:    04/13/18 1525 04/13/18 1530 04/13/18 1545   BP: 133/71 132/59    Resp: 14 15 16   Temp: 37.2  C (99  F) 36.5  C (97.7  F) 36.4  C (97.5  F)   SpO2:  99% 99%         Electronically Signed By: Elton Stevens MD  April 13, 2018  3:50 PM

## 2018-04-13 NOTE — IP AVS SNAPSHOT
[facility]    [address] [place] [address]    Phone:  [phone]    Fax:  [phone]                                       After Visit Summary   4/13/2018    Khloe Tamez    MRN: [id number]           After Visit Summary Signature Page     I have received my discharge instructions, and my questions have been answered. I have discussed any challenges I see with this plan with the nurse or doctor.    ..........................................................................................................................................  Patient/Patient Representative Signature      ..........................................................................................................................................  Patient Representative Print Name and Relationship to Patient    ..................................................               ................................................  Date                                            Time    ..........................................................................................................................................  Reviewed by Signature/Title    ...................................................              ..............................................  Date                                                            Time

## 2018-04-13 NOTE — ANESTHESIA CARE TRANSFER NOTE
Patient: Khloe Tamez    Procedure(s):  Examination Under Anesthesia Anus, Local Excision of Perianal Melanoma   - Wound Class: II-Clean Contaminated    Diagnosis: Perianal Melanoma  Diagnosis Additional Information: No value filed.    Anesthesia Type:   MAC     Note:  Airway :Face Mask  Patient transferred to:PACU  Comments: VSS/WNL. Responds well.Handoff Report: Identifed the Patient, Identified the Reponsible Provider, Reviewed the pertinent medical history, Discussed the surgical course, Reviewed Intra-OP anesthesia mangement and issues during anesthesia, Set expectations for post-procedure period and Allowed opportunity for questions and acknowledgement of understanding      Vitals: (Last set prior to Anesthesia Care Transfer)    CRNA VITALS  4/13/2018 1439 - 4/13/2018 1514      4/13/2018             Pulse: 84    SpO2: (!)  82 %    Resp Rate (observed): 12                Electronically Signed By: JELENA Montes De Oca CRNA  April 13, 2018  3:14 PM

## 2018-04-17 NOTE — TELEPHONE ENCOUNTER
Called patient to reschedule MRI.  Patient is rescheduled to 4/26/18 at 4:00 pm. In speaking with patient, she states she is claustrophobic.  Informed patient that I will send a message to Dr. Valentine's RN coordinator to send a sedative prescription.  Informed patient she will need to  her prescription at our pharmacy and bring it with to her appointment.  Informed patient she will need to arrive one hour early for her scan, and will need a  .  Patient verbalized understanding.  Message sent to Dr. Valentine's coordinator.

## 2018-04-17 NOTE — TELEPHONE ENCOUNTER
----- Message from Suzanna Wise RN sent at 4/16/2018  5:11 PM CDT -----  Please see if you can  her MRI to the same day as alton Briseno

## 2018-04-26 PROBLEM — C21.0: Status: ACTIVE | Noted: 2018-01-01

## 2018-04-26 NOTE — PROGRESS NOTES
"Khloe Tamez is a 62-year-old woman I was asked to see at the request of Dr. Wright for evaluation of an anal melanoma.  The patient states that her physician noticed that she had a \"external hemorrhoid\" in the fall.  This became more painful and she was eventually referred to a surgeon who performed a biopsy which demonstrated a 3.5 mm nonulcerative melanoma.  Mitotic index was 5 per mm2 tissue and she had a positive margin.  After this she had a PET CT scan, which revealed hypermetabolic activity in the left anal region as well as a left inguinal lymph node that was hypermetabolic and slightly enlarged.  She describes the lesions primarily being on the left side.  The PET CT scan did not show any other suspicious metastatic disease.  She underwent a wide excision by Dr. Wright on 04/13.  He described getting 1 cm margins around the tumor.  The pathology from this operation is still pending.  She subsequently had a dehiscence of this wound.  She is now here to talk about management of her left femoral lymph node.      PAST MEDICAL HISTORY:  No other major medical problems.  She states that her father may have had melanoma.      PHYSICAL EXAMINATION:  She is a well-appearing woman in no apparent distress.  Examination of both regions revealed no palpable or hard lymph nodes.  Examination of her anus demonstrates an open granulating wound with no residual pigmentation.  The wound is on the left perianal side.      IMPRESSION:  Anal melanoma.      PLAN:  I am concerned about this hypermetabolic left inguinal lymph node.  I did recommend a sentinel lymph node biopsy.  This will require injection around the skin in her perianal area with radioactive tracer and blue dye.  She understands and wishes to proceed.      TT:  30 minutes.  CT:  20 minutes.      cc:   Nigel Valentine MD   [address]  [id number]      Ronald Wright MD    Physicians    [address] [place] [address] " [address] [place] [address]  [id number]

## 2018-04-26 NOTE — MR AVS SNAPSHOT
After Visit Summary   4/26/2018    Khloe Tamez    MRN: [id number]           Patient Information     Date Of Birth          1955        Visit Information        Provider Department      4/26/2018 8:15 AM Adams Shah MD [facility]        Today's Diagnoses     Melanoma of anus (H)    -  1       Follow-ups after your visit        Your next 10 appointments already scheduled     May 02, 2018  8:00 AM CDT   NM LYMPHOSCINTIGRAPHY INJECTION AND SCAN with UUNM3   [facility] [place], Nuclear Medicine ([facility], [facility] [place] [facility])    [address] [address] [phone]           Please bring a list of your medicines to the exam. (Include vitamins, minerals and over-the-counter drugs.) You should wear comfortable clothes. Leave your valuables at home. Please bring related prior results and films.  Tell your doctor:   If you are breastfeeding or may be pregnant.   If you have had a barium test within the past few days. Barium may change the results of certain exams.   If you think you may need sedation (medicine to help you relax).  You may eat and drink as normal.  Please call your Imaging Department at your exam site with any questions.            May 02, 2018   Procedure with Adams Shah MD   [facility] ([facility])    [address] [place], [place] [id number] [phone]           Located in the Clinics and Surgery Center at [address] [place] [address].   parking is very convenient and highly recommended.  is a $6 flat rate fee.  Both  and self parkers should enter the main arrival plaza from [facility]; parking attendants will direct you based on your parking preference.            May 03, 2018 11:00 AM CDT   (Arrive by 10:45 AM)   Return Visit with Nigel Valentine MD   [facility] ([facility] and  "Surgery Center)    [address]  [address]  [facility] [address] [phone]              Who to contact     If you have questions or need follow up information about today's clinic visit or your schedule please contact [facility] directly at [phone].  Normal or non-critical lab and imaging results will be communicated to you by MyChart, letter or phone within 4 business days after the clinic has received the results. If you do not hear from us within 7 days, please contact the clinic through [facility] or phone. If you have a critical or abnormal lab result, we will notify you by phone as soon as possible.  Submit refill requests through [facility] or call your pharmacy and they will forward the refill request to us. Please allow 3 business days for your refill to be completed.          Additional Information About Your Visit        MyChart Information     [facility] gives you secure access to your electronic health record. If you see a primary care provider, you can also send messages to your care team and make appointments. If you have questions, please call your primary care clinic.  If you do not have a primary care provider, please call [phone] and they will assist you.        Care EveryWhere ID     This is your Care EveryWhere ID. This could be used by other organizations to access your [facility] medical records  [id number]        Your Vitals Were     Pulse Temperature Respirations Height Pulse Oximetry BMI (Body Mass Index)    83 98.8  F (37.1  C) (Oral) 16 1.676 m (5' 6\") 96% 34.31 kg/m2       Blood Pressure from Last 3 Encounters:   04/26/18 140/81   04/13/18 127/89   04/05/18 143/85    Weight from Last 3 Encounters:   04/26/18 96.4 kg (212 lb 9 oz)   04/13/18 98 kg (216 lb 1.8 oz)   04/05/18 97.8 kg (215 lb 11.2 oz)              We Performed the Following     Liana-Operative Worksheet (Breast Center - Plastics)        Primary Care Provider Office Phone # Fax #    Aide " MD Marcelo [phone]       [facility] [address] [address]        Equal Access to Services     SHELIA WALKER : Dajuan Cox, karina gallardo, zac cavazosmasilvia yarbroughmiriamtaylor guzmán. So [facility] [phone].    ATENCIÓN: Si habla español, tiene a tim disposición servicios gratuitos de asistencia lingüística. Llame al [phone].    We comply with applicable federal civil rights laws and [place] laws. We do not discriminate on the basis of race, color, national origin, age, disability, sex, sexual orientation, or gender identity.            Thank you!     Thank you for choosing [facility]  for your care. Our goal is always to provide you with excellent care. Hearing back from our patients is one way we can continue to improve our services. Please take a few minutes to complete the written survey that you may receive in the mail after your visit with us. Thank you!             Your Updated Medication List - Protect others around you: Learn how to safely use, store and throw away your medicines at www.disposemymeds.org.          This list is accurate as of 4/26/18 11:59 PM.  Always use your most recent med list.                   Brand Name Dispense Instructions for use Diagnosis    ALEVE PO      Take 220 mg by mouth every 12 hours        calcium carbonate 500 tablet    OS-JANAY 500 mg [place]. Ca     Take 1 tablet by mouth every evening        LORazepam 0.5 MG tablet    ATIVAN    3 tablet    Take 1 tablet (0.5 mg) by mouth as needed for anxiety (1 mg Ativan 1 hour prior. Take additional 0.5 mg 30 minutes prior if still feeling anxious.)    Anxiety       MAGNESIUM PO      Take by mouth At Bedtime        TYLENOL ARTHRITIS PAIN 650 MG CR tablet   Generic drug:  acetaminophen      Take 650 mg by mouth every 6 hours as needed for mild pain or fever        VITAMIN D (CHOLECALCIFEROL) PO      Take by mouth every evening

## 2018-04-26 NOTE — NURSING NOTE
"Oncology Rooming Note    April 26, 2018 8:27 AM   Khloe Tamez is a 62 year old female who presents for:    Chief Complaint   Patient presents with     Oncology Clinic Visit     Return: Melanoma     Initial Vitals: /81  Pulse 83  Temp 98.8  F (37.1  C) (Oral)  Resp 16  Ht 1.676 m (5' 6\")  Wt 96.4 kg (212 lb 9 oz)  SpO2 96%  BMI 34.31 kg/m2 Estimated body mass index is 34.31 kg/(m^2) as calculated from the following:    Height as of this encounter: 1.676 m (5' 6\").    Weight as of this encounter: 96.4 kg (212 lb 9 oz). Body surface area is 2.12 meters squared.  Mild Pain (3) Comment: incision site   No LMP recorded. Patient is postmenopausal.  Allergies reviewed: Yes  Medications reviewed: Yes    Medications: Medication refills not needed today.  Pharmacy name entered into [facility]: [place], [place]    Clinical concerns: no new concerns today Dr. Shah was notified.    10 minutes for nursing intake (face to face time)     Aubrey Eduardo CMA              "

## 2018-05-02 NOTE — ANESTHESIA POSTPROCEDURE EVALUATION
Patient: Khloe Tamez    Procedure(s):  [place] Lymph Node Biopsy x3, Lymphatic mapping - Wound Class: I-Clean    Diagnosis:Anal Melanoma  Diagnosis Additional Information: No value filed.    Anesthesia Type:  MAC    Note:  Anesthesia Post Evaluation    Patient location during evaluation: bedside  Patient participation: Able to fully participate in evaluation  Level of consciousness: awake  Pain management: adequate  Airway patency: patent  Cardiovascular status: acceptable  Respiratory status: acceptable  Hydration status: acceptable  PONV: controlled     Anesthetic complications: None          Last vitals:  Vitals:    05/02/18 1405 05/02/18 1415 05/02/18 1430   BP: 117/68 118/63 131/83   Pulse: 73 66 65   Resp: 12 12 14   Temp: 36.3  C (97.4  F)  36.3  C (97.4  F)   SpO2: 97% 97% 98%         Electronically Signed By: Perry Steven MD, MD  May 2, 2018  2:50 PM

## 2018-05-02 NOTE — DISCHARGE INSTRUCTIONS
[facility]  Home Care Following Anesthesia  For 24 hours after surgery:  1. Get plenty of rest.  A responsible adult must stay with you for at least 24 hours after you leave the surgery center.  2. Do not drive or use heavy equipment.  If you have weakness or tingling, don't drive or use heavy equipment until this feeling goes away.   3. Do not drink alcohol.   4. Avoid strenuous or risky activities.  Ask for help when climbing stairs.  5. You may feel lightheaded.  IF so, sit for a few minutes before standing.  Have someone help you get up.   6. If you have nausea (feel sick to your stomach): Drink only clear liquids such as apple juice, ginger ale, broth or 7-Up.  Rest may also help.  Be sure to drink enough fluids.  Move to a regular diet as you feel able.   7. You may have a slight fever.  Call the doctor if your fever is over 100 F (37.7 C) (taken under the tongue) or lasts longer than 24 hours.  8. You may have a dry mouth, a sore throat, muscle aches or trouble sleeping. These should go away after 24 hours.  9. Do not make important or legal decisions.    Tips for taking pain medications  To get the best pain relief possible, remember these points:    Take pain medications as directed, before pain becomes severe.    Pain medication can upset your stomach: taking it with food may help.    Constipation is a common side effect of pain medication. Drink plenty of  fluids.    Eat foods high in fiber. Take a stool softener if recommended by your doctor or pharmacist.    Do not drink alcohol, drive or operate machinery while taking pain medications.    Ask about other ways to control pain, such as with heat, ice or relaxation.    Tylenol/Acetaminophen Consumption  To help encourage the safe use of acetaminophen, the makers of TYLENOL  have lowered the maximum daily dose for single-ingredient Extra Strength TYLENOL  (acetaminophen) products sold in the [place] from 8 pills per day  (4,000 mg) to 6 pills per day (3,000 mg). The dosing interval has also changed from 2 pills every 4-6 hours to 2 pills every 6 hours.    If you feel your pain relief is insufficient, you may take Tylenol/Acetaminophen in addition to your narcotic pain medication.     Be careful not to exceed 3,000 mg of Tylenol/Acetaminophen in a 24 hour period from all sources.    If you are taking extra strength Tylenol/acetaminophen (500 mg), the maximum dose is 6 tablets in 24 hours.    If you are taking regular strength acetaminophen (325 mg), the maximum dose is 9 tablets in 24 hours.    Call a doctor for any of the followin. Signs of infection (fever, growing tenderness at the surgery site, a large amount of drainage or bleeding, severe pain, foul-smelling drainage, redness, swelling).  2. It has been over 8 to 10 hours since surgery and you are still not able to urinate (pass water).  3. Headache for over 24 hours.  Your doctor is:       Dr. Adams Shah, [facility]: [phone]               Or dial [phone] and ask for the resident on call for:  [facility]  For emergency care, call the:  [facility]:  [phone] (TTY for hearing impaired: [phone])

## 2018-05-02 NOTE — OP NOTE
Procedure Date: 2018      PREOPERATIVE DIAGNOSIS:  Anal melanoma.      POSTOPERATIVE DIAGNOSIS:  Anal melanoma.      PROCEDURE:  Lymphatic mapping and left femoral sentinel lymph node biopsy x3.      ATTENDING SURGEON:  Adams Shah MD      ANESTHESIA:  Local with IV sedation.      INDICATIONS FOR SURGERY:  The patient is a 62-year-old woman who was found to have an anal melanoma.  She underwent a PET scan, which revealed a hypermetabolic lymph node in her left groin without any evidence of distant metastasis.  She underwent a wide local excision of her anal melanoma by Dr. Wright.  She now presents for her sentinel lymph node biopsy.      PROCEDURE IN DETAIL:  After informed consent, the patient was brought to the operating room and given IV sedation.  I injected isosulfan blue intradermally around her excision site.  The patient's left groin was then prepped and draped in the usual fashion.  A transcutaneous hot spot was identified in the left groin, and local anesthetic was administered, and I made an incision in her left groin crease.  The Bovie cautery was used to incise the subcutaneous tissues.  We found 3 radioactive blue lymph nodes.  The first lymph node was also black.  It was removed and had ex vivo counts of 200 counts per second.  The second lymph node was also black and had ex vivo counts of 110 counts per second.  The third sentinel lymph node was not black and had ex vivo counts of 40.  After removal of all 3 sentinel lymph nodes, there were no additional blue radioactive or palpable lymph nodes.  The dermis was closed with an interrupted 3-0 Vicryl suture.  The skin was closed with a running 4-0 PDS subcuticular stitch.  Dermabond was placed, and the patient was taken to the recovery room in stable condition.         ADAMS SHAH MD             D: 2018   T: 2018   MT: aroldo      Name:     ROXANNA ASTUDILLO   MRN:      [id number]        Account:        [id number]   :       1955           Procedure Date: 05/02/2018      Document: [id number]

## 2018-05-02 NOTE — IP AVS SNAPSHOT
[facility]    [address] [place] [address]    Phone:  [phone]    Fax:  [phone]                                       After Visit Summary   5/2/2018    Khloe Tamez    MRN: [id number]           After Visit Summary Signature Page     I have received my discharge instructions, and my questions have been answered. I have discussed any challenges I see with this plan with the nurse or doctor.    ..........................................................................................................................................  Patient/Patient Representative Signature      ..........................................................................................................................................  Patient Representative Print Name and Relationship to Patient    ..................................................               ................................................  Date                                            Time    ..........................................................................................................................................  Reviewed by Signature/Title    ...................................................              ..............................................  Date                                                            Time

## 2018-05-02 NOTE — ANESTHESIA CARE TRANSFER NOTE
Patient: Khloe Tamez    Procedure(s):  [place] Lymph Node Biopsy x3, Lymphatic mapping - Wound Class: I-Clean    Diagnosis: Anal Melanoma  Diagnosis Additional Information: No value filed.    Anesthesia Type:   MAC     Note:  Airway :Room Air  Patient transferred to:Phase II  Comments: Patient awake and breathing spont. VSS. No complaints of pain or nausea. Report to RNHandoff Report: Identifed the Patient, Identified the Reponsible Provider, Reviewed the pertinent medical history, Discussed the surgical course, Reviewed Intra-OP anesthesia mangement and issues during anesthesia, Set expectations for post-procedure period and Allowed opportunity for questions and acknowledgement of understanding      Vitals: (Last set prior to Anesthesia Care Transfer)    CRNA VITALS  5/2/2018 1329 - 5/2/2018 1407      5/2/2018             Pulse: 78    Ht Rate: 87    SpO2: 94 %    Resp Rate (set): 10                Electronically Signed By: JELENA Dickinson CRNA  May 2, 2018  2:07 PM

## 2018-05-02 NOTE — IP AVS SNAPSHOT
MRN:[id number]                      After Visit Summary   5/2/2018    Khloe Tamez    MRN: [id number]           Thank you!     Thank you for choosing [facility] for your care. Our goal is always to provide you with excellent care. Hearing back from our patients is one way we can continue to improve our services. Please take a few minutes to complete the written survey that you may receive in the mail after you visit with us. Thank you!        Patient Information     Date Of Birth          1955        About your hospital stay     You were admitted on:  May 2, 2018 [facility]    You were discharged on:  May 2, 2018       Who to Call     For medical emergencies, please call 911.  For non-urgent questions about your medical care, please call your primary care provider or clinic, [phone]  For questions related to your surgery, please call your surgery clinic        Attending Provider     Provider Adams Richardson MD General Surgery       Primary Care Provider Office Phone # Fax #    Aide Robertson [phone]      After Care Instructions     Diet Instructions       Resume pre-procedure diet            Discharge Instructions       1. Patient to follow up with appointment in 2 weeks. Your pathology report will be reviewed with you in person in clinic.  2. Do not drive while taking narcotic pain medication.   3. May take plain Tylenol as needed for pain.  4. Avoid non-steroidal anti-inflammatory medications (Advil, Ibuprofen, Naproxen, aspirin, etc) for 5-7 days.   5. Caution with putting ice on the incision as it will be numb you will not realize it if you leave the ice for too long and can damage your skin.  6. If you develop any fever/chills, worsening pain, redness, swelling, or drainage from your wound please call the clinic (Monday through Friday 8:00am-5:00pm [phone] Paty GASPAR) or on-call surgical  "oncology resident (nights and weekends [phone] and ask \"I would like to page the Surgical Oncology Resident on call.\")   7. No lifting over 15 lbs and no strenuous physical activity for 2 weeks.  No bed rest; up and walking around is good for preventing blood clots in the legs.  8. Keep incision clean and dry. Okay to shower in 2 days. lease refrain from submerging in a bathtub or swimming pool. Please refrain from applying alcohol or peroxide to the incision.            Do not - immerse incision in water until sutures removed       Do not immerse incision in water for 2 weeks            No driving or operating machinery        until the day after procedure or while taking narcotics            No lifting        No lifting over 20 lbs and no strenuous physical activity for 2 weeks            Shower       No shower for 24 hours post procedure. May shower Postoperative Day (POD)  2            Weight bearing status - As tolerated                 Your next 10 appointments already scheduled     May 10, 2018  7:45 AM CDT   [facility] with  [facility] D[facility]   [facility] ([facility])    [address] [place] [address]  [address] [place] [address] [id number] [phone]            May 10, 2018  8:30 AM CDT   (Arrive by 8:15 AM)   Return Visit with Nigel Valentine MD   [facility] ([facility])    [address] [place] [address] [id number] [phone]              Further instructions from your care team       [facility]  Home Care Following Anesthesia  For 24 hours after surgery:  1. Get plenty of rest.  A responsible adult must stay with you for at least 24 hours after you leave the surgery center.  2. Do not drive or use heavy equipment.  If you have weakness or tingling, don't drive or use heavy equipment until this feeling goes away.   3. Do not drink alcohol.   4. Avoid " strenuous or risky activities.  Ask for help when climbing stairs.  5. You may feel lightheaded.  IF so, sit for a few minutes before standing.  Have someone help you get up.   6. If you have nausea (feel sick to your stomach): Drink only clear liquids such as apple juice, ginger ale, broth or 7-Up.  Rest may also help.  Be sure to drink enough fluids.  Move to a regular diet as you feel able.   7. You may have a slight fever.  Call the doctor if your fever is over 100 F (37.7 C) (taken under the tongue) or lasts longer than 24 hours.  8. You may have a dry mouth, a sore throat, muscle aches or trouble sleeping. These should go away after 24 hours.  9. Do not make important or legal decisions.    Tips for taking pain medications  To get the best pain relief possible, remember these points:    Take pain medications as directed, before pain becomes severe.    Pain medication can upset your stomach: taking it with food may help.    Constipation is a common side effect of pain medication. Drink plenty of  fluids.    Eat foods high in fiber. Take a stool softener if recommended by your doctor or pharmacist.    Do not drink alcohol, drive or operate machinery while taking pain medications.    Ask about other ways to control pain, such as with heat, ice or relaxation.    Tylenol/Acetaminophen Consumption  To help encourage the safe use of acetaminophen, the makers of TYLENOL  have lowered the maximum daily dose for single-ingredient Extra Strength TYLENOL  (acetaminophen) products sold in the [place] from 8 pills per day (4,000 mg) to 6 pills per day (3,000 mg). The dosing interval has also changed from 2 pills every 4-6 hours to 2 pills every 6 hours.    If you feel your pain relief is insufficient, you may take Tylenol/Acetaminophen in addition to your narcotic pain medication.     Be careful not to exceed 3,000 mg of Tylenol/Acetaminophen in a 24 hour period from all sources.    If you are taking extra strength  "Tylenol/acetaminophen (500 mg), the maximum dose is 6 tablets in 24 hours.    If you are taking regular strength acetaminophen (325 mg), the maximum dose is 9 tablets in 24 hours.    Call a doctor for any of the followin. Signs of infection (fever, growing tenderness at the surgery site, a large amount of drainage or bleeding, severe pain, foul-smelling drainage, redness, swelling).  2. It has been over 8 to 10 hours since surgery and you are still not able to urinate (pass water).  3. Headache for over 24 hours.  Your doctor is:       Dr. Adams Shah, [facility]: [phone]               Or dial [phone] and ask for the resident on call for:  [facility]  For emergency care, call the:  [place] [facility]:  [phone] (TTY for hearing impaired: [phone])                Pending Results     No orders found from 2018 to 5/3/2018.            Admission Information     Date & Time Provider Department Dept. Phone    2018 Adams Shah MD [facility] [phone]      Your Vitals Were     Blood Pressure Pulse Temperature Respirations Height Weight    117/68 73 97.4  F (36.3  C) (Oral) 12 1.676 m (5' 6\") 95.3 kg (210 lb)    Pulse Oximetry BMI (Body Mass Index)                97% 33.89 kg/m2          [employer] Information     [employer] gives you secure access to your electronic health record. If you see a primary care provider, you can also send messages to your care team and make appointments. If you have questions, please call your primary care clinic.  If you do not have a primary care provider, please call [phone] and they will assist you.      [employer] is an electronic gateway that provides easy, online access to your medical records. With [employer], you can request a clinic appointment, read your test results, renew a prescription or communicate with your care team.     To access your existing account, please contact your [facility] or call [phone] for assistance.        Care EveryWhere ID     This is your Care EveryWhere ID. This could be used by other organizations to access your [place] medical records  [id number]        Equal Access to Services     SHELIA WALKER : Hadii aad ku hadspensercody Kenny, waaxda luqadaha, qaybta kaalmada adenicola, silvia hargrovehallie soteloclaudio israeltaylor guzmán. So [facility] [phone].    ATENCIÓN: Si habla español, tiene a tim disposición servicios gratuitos de asistencia lingüística. Llame al [phone].    We comply with applicable federal civil rights laws and [place] laws. We do not discriminate on the basis of race, color, national origin, age, disability, sex, sexual orientation, or gender identity.               Review of your medicines      START taking        Dose / Directions    oxyCODONE IR 5 MG tablet   Commonly known as:  ROXICODONE   Used for:  Malignant melanoma of anal skin (H)        Dose:  5 mg   Take 1 tablet (5 mg) by mouth every 6 hours as needed for pain or other (Moderate to Severe)   Quantity:  6 tablet   Refills:  0         CONTINUE these medicines which have NOT CHANGED        Dose / Directions    ALEVE PO        Dose:  220 mg   Take 220 mg by mouth every 12 hours   Refills:  0       calcium carbonate 500 tablet   Commonly known as:  OS-JANAY 500 mg [place]. Ca        Dose:  1 tablet   Take 1 tablet by mouth every evening   Refills:  0       LORazepam 0.5 MG tablet   Commonly known as:  ATIVAN   Used for:  Anxiety        Dose:  0.5 mg   Take 1 tablet (0.5 mg) by mouth as needed for anxiety (1 mg Ativan 1 hour prior. Take additional 0.5 mg 30 minutes prior if still feeling anxious.)   Quantity:  3 tablet   Refills:  0       MAGNESIUM PO        Take by mouth At Bedtime   Refills:  0       TYLENOL ARTHRITIS PAIN 650 MG CR tablet   Generic drug:  acetaminophen        Dose:  650 mg   Take 650 mg by mouth every 6 hours as needed for mild pain or fever   Refills:  0       VITAMIN D  (CHOLECALCIFEROL) PO        Take by mouth every evening   Refills:  0            Where to get your medicines      Some of these will need a paper prescription and others can be bought over the counter. Ask your nurse if you have questions.     Bring a paper prescription for each of these medications     oxyCODONE IR 5 MG tablet                Protect others around you: Learn how to safely use, store and throw away your medicines at www.disposemymeds.org.        Information about OPIOIDS     PRESCRIPTION OPIOIDS: WHAT YOU NEED TO KNOW   You have a prescription for an opioid (narcotic) pain medicine. Opioids can cause addiction. If you have a history of chemical dependency of any type, you are at a higher risk of becoming addicted to opioids. Only take this medicine after all other options have been tried. Take it for as short a time and as few doses as possible.     Do not:    Drive. If you drive while taking these medicines, you could be arrested for driving under the influence (DUI).    Operate heavy machinery    Do any other dangerous activities while taking these medicines.     Drink any alcohol while taking these medicines.      Take with any other medicines that contain acetaminophen. Read all labels carefully. Look for the word  acetaminophen  or  Tylenol.  Ask your pharmacist if you have questions or are unsure.    Store your pills in a secure place, locked if possible. We will not replace any lost or stolen medicine. If you don t finish your medicine, please throw away (dispose) as directed by your pharmacist. The [place] Pollution Control Agency has more information about safe disposal: https://www.pca.[facility].[place].us/living-green/managing-unwanted-medications    All opioids tend to cause constipation. Drink plenty of water and eat foods that have a lot of fiber, such as fruits, vegetables, prune juice, apple juice and high-fiber cereal. Take a laxative (Miralax, milk of magnesia, Colace, Senna) if you don t  move your bowels at least every other day.              Medication List: This is a list of all your medications and when to take them. Check marks below indicate your daily home schedule. Keep this list as a reference.      Medications           Morning Afternoon Evening Bedtime As Needed    ALEVE PO   Take 220 mg by mouth every 12 hours                                calcium carbonate 500 tablet   Commonly known as:  OS-JANAY 500 mg [place]. Ca   Take 1 tablet by mouth every evening                                LORazepam 0.5 MG tablet   Commonly known as:  ATIVAN   Take 1 tablet (0.5 mg) by mouth as needed for anxiety (1 mg Ativan 1 hour prior. Take additional 0.5 mg 30 minutes prior if still feeling anxious.)                                MAGNESIUM PO   Take by mouth At Bedtime                                oxyCODONE IR 5 MG tablet   Commonly known as:  ROXICODONE   Take 1 tablet (5 mg) by mouth every 6 hours as needed for pain or other (Moderate to Severe)                                TYLENOL ARTHRITIS PAIN 650 MG CR tablet   Take 650 mg by mouth every 6 hours as needed for mild pain or fever   Generic drug:  acetaminophen                                VITAMIN D (CHOLECALCIFEROL) PO   Take by mouth every evening

## 2018-05-02 NOTE — BRIEF OP NOTE
[facility]    Brief Operative Note    Pre-operative diagnosis: Anal Melanoma  Post-operative diagnosis Same  Procedure: Procedure(s):  [place] Lymph Node Biopsy x3, Lymphatic mapping - Wound Class: I-Clean  Surgeon: Surgeon(s) and Role:     * Adams Shah MD - Primary  Peter Stern MD - Resident   Anesthesia: Monitor Anesthesia Care   Estimated blood loss: 5cc  Drains: None  Specimens:   ID Type Source Tests Collected by Time Destination   A : Left Femoral [place] Lymph Node #1 Tissue Lymph Node SURGICAL PATHOLOGY EXAM Adams Shah MD 5/2/2018  1:32 PM    B : Left Femoral [place] Lymph Node #2 Tissue Lymph Node SURGICAL PATHOLOGY EXAM Adams Shah MD 5/2/2018  1:43 PM    C : Left Femoral [place] Lymph Node #3 Tissue Lymph Node SURGICAL PATHOLOGY EXAM Adams Shah MD 5/2/2018  1:45 PM      Findings:   3 blue and hot lymph nodes removed from left groin. 2/3 appeared to have melanocytic mets.  Complications: None.  Implants: None.

## 2018-05-10 NOTE — LETTER
5/10/2018       RE: Khloe Tamez  [address] [facility] [id number]     Dear Colleague,    Thank you for referring your patient, Khloe Tamez, to the [facility]. Please see a copy of my visit note below.    [facility]  MEDICAL ONCOLOGY CONSULT  May 10, 2018    CHIEF COMPLAINT: Perianal melanoma    Melanoma History:.  1. 3/27/18 patient had perianal lesion biopsied in [place] ([id number]). This showed malignant melanoma, positive for HMB45 and S100, and negative for CD45 and pancytokeratin. Breslow depth at least 3.5 mm, non-ulcerated, with 5 mitoses/mm2, TILs present with deep and peripheral margins positive; pT3a.  2. 3/29/18, PET-CT showed a perianal FDG avid lesion (SUV max 10.8) and a superficial left inguinal lymph node measuring 0.9 cm (SUV max 4.1).  3. 4/13/18, she has wide local excision of a 2 x 2.5 cm perianal melanoma with anaplastic closure, under Dr. Wright. Pathology showed a nodular melanoma, Breslow depth 6.0 mm to mark's level IV, there were more than 10 mitoses per mm2, with ulceration, and lymphovascular invasion present. No perineural invasion or microsatellitosis. Tumor infiltrating lymphocytes present, non-brisk. AJCC microstage: pT4b.  4. 4/26/18, she has MRI brain, which is negative for metastatic disease.  5. 5/2/18, she returns to OR under Dr. Shah for left femoral sentinel lymph node biopsy, with 2 (of 3) involved lymph nodes. IHC positive for Melan-A, tyrosinase, and HMB-45. No definite extranodal extension.      HISTORY OF PRESENT ILLNESS  Khloe Tamez is a 62 year old female from [place], [place] with resected perianal melanoma. She presents after sentinel lymph node biopsy for consideration of next steps. She has recovered well from her surgeries, denies cough, shortness of breath, nausea, vomiting, or abdominal pain. She has had no fevers or chills. No new palpable lumps or bumps. No new neurologic complaints. KPS 90.    REVIEW  OF SYSTEMS  A 12-point ROS negative except as in HPI.    Past Medical History:   Diagnosis Date     Melanoma malignant, perianal skin (H)      Obesity (BMI 30.0-34.9)      Osteoarthritis      Past Surgical History:   Procedure Laterality Date     BIOPSY NODE SENTINEL Left 2018    Procedure: BIOPSY NODE SENTINEL;  [place] Lymph Node Biopsy x3, Lymphatic mapping;  Surgeon: Adams Shah MD;  Location: [facility] OR      SECTION       COLONOSCOPY       EXAM UNDER ANESTHESIA ANUS N/A 2018    Procedure: EXAM UNDER ANESTHESIA ANUS;  Examination Under Anesthesia Anus, Local Excision of Perianal Melanoma;  Surgeon: Ronald Wright MD;  Location: [facility] OR     EXCISE LESION RECTUM N/A 2018    Procedure: EXCISE LESION RECTUM;;  Surgeon: Ronald Wright MD;  Location: [facility] OR       SOCIAL HISTORY  She has worked as a caregiver, currently works at assisted living facility. Lifelong nonsmoker. No significant alcohol. She has twins.      FAMILY HISTORY  Father had a skin cancer, unsure if melanoma. Paternal aunt had stomach cancer.    PHYSICAL EXAMINATION  BP (!) 143/93 (BP Location: Right arm, Patient Position: Sitting, Cuff Size: Adult Regular)  Pulse 65  Temp 97.9  F (36.6  C) (Oral)  Resp 16  Wt 95.5 kg (210 lb 9.6 oz)  SpO2 99%  BMI 33.99 kg/m2  Wt Readings from Last 2 Encounters:   05/10/18 95.5 kg (210 lb 9.6 oz)   18 95.3 kg (210 lb)     Physical Exam   Constitutional: She is oriented to person, place, and time. She appears well-developed and well-nourished.   HENT:   Mouth/Throat: Oropharynx is clear and moist.   Eyes: EOM are normal. Pupils are equal, round, and reactive to light. No scleral icterus.   Neck: Normal range of motion. Neck supple.   Cardiovascular: Regular rhythm.    Pulmonary/Chest: Effort normal and breath sounds normal. She has no wheezes.   Abdominal: Soft. She exhibits no mass.   Musculoskeletal: Normal range of motion. She exhibits no tenderness.   Lymphadenopathy:     She has  no cervical adenopathy.     She has no axillary adenopathy.        Right: No inguinal and no supraclavicular adenopathy present.        Left: No inguinal and no supraclavicular adenopathy present.   Neurological: She is alert and oriented to person, place, and time. No cranial nerve deficit.   Skin: Skin is warm and dry. No rash noted. No pallor.   Psychiatric: She has a normal mood and affect.   Nursing note and vitals reviewed.      ASSESSMENT AND PLAN  #1 Resected Stage IIIC nodular melanoma, pT4b pN2b, perianal primary  It was a pleasure to meet Ms. Tamez today. She is a 62 year old woman with resected perianal melanoma. She had an imaging detectable 0.9 mm FDG avid lymph node in the sentinel node and this returned positive. In total 2 of 3 lymph nodes were involved.    I again reviewed adjuvant checkpoint inhibitor therapy as my recommendation for further therapy. Adjuvant nivolumab has been shown to improve outcomes for patients with stage III melanoma. After complete resection 12-month RFS was 70.5% for nivolumab and 60.8% for ipilimumab in the Checkmate-238, versus 56% in the placebo group of the EORTC-51978 study.     We will plan to send [facility] molecular studies and PD-L1 expression testing on the wide local specimen. The order has been placed and is showing up as needing to receive specimen. We will follow-up on this.     In the meantime she will likely try to receive therapy closer to home. Otherwise, I have recommended she begin adjuvant nivolumab here later this month.    Multiple questions answered.    Nigel Person M.D.   of Medicine  Hematology, Oncology and Transplantation

## 2018-05-10 NOTE — LETTER
5/10/2018       RE: Khloe Tamez  [address] [id number]     Dear Colleague,    Thank you for referring your patient, Khloe Tamez, to the [facility] at [facility]. Please see a copy of my visit note below.    HISTORY OF PRESENT ILLNESS:  Khloe Tamez is here for a postoperative visit after undergoing a lymphatic mapping and sentinel lymph node biopsy.  She had 2 of 3 lymph nodes that were positive for metastatic melanoma.  She has seen Dr. Valentine who has recommended adjuvant immune therapy.      PHYSICAL EXAMINATION:  Both of her incisions are healing well with no evidence of infection.      IMPRESSION:  Postop check.      PLAN:  I told her that I would not recommend a femoral lymph node dissection at this time.  I think this would increase her risk of complications and delay her ultimate adjuvant therapy.  She is at risk for having recurrence in her groin but I think her distant recurrence is unfortunately even higher.  I will see her in the future if any problems arise.      cc:   Ronald Wright MD     Physicians    [address] [facility], [address] [address]      [place] [address]         Again, thank you for allowing me to participate in the care of your patient.      Sincerely,    Adams Shah MD

## 2018-05-10 NOTE — MR AVS SNAPSHOT
After Visit Summary   5/10/2018    Khloe Tamez    MRN: [id number]           Patient Information     Date Of Birth          1955        Visit Information        Provider Department      5/10/2018 8:30 AM Nigel Gauthier MD [facility]        Today's Diagnoses     Metastatic malignant melanoma (H)    -  1       Follow-ups after your visit        Follow-up notes from your care team     Return in about 3 weeks (around 5/31/2018).      Your next 10 appointments already scheduled     May 31, 2018  7:30 AM CDT   M[facility]ic Lab Draw with [facility] MASONIC LAB DRAW   [facility] Lab Draw ([facility])    [address]  [address]  [facility] [address]   [phone]            May 31, 2018  8:00 AM CDT   (Arrive by 7:45 AM)   Return Visit with Nigel Valentine MD   [facility] ([facility])    [address]  [address]  [facility] [address]   [phone]            May 31, 2018 11:00 AM CDT   Infusion 62 with [facility] ONCOLOGY INFUSION, UC 18 ATC   [facility] ([facility])    [address]  [address]  [facility] [address]   [phone]            Jun 14, 2018 10:15 AM CDT   M[facility] Lab Draw with [facility] LAB DRAW   [facility] Lab Draw ([facility])    [address]  [address]  [facility] [address]   [phone]            Jun 14, 2018 11:00 AM CDT   (Arrive by 10:45 AM)   Return Visit with Nigel Valentine MD   [facility] ([facility])    [address] [place] [address]  [address]  [facility] [address]   [phone]            Jun 14, 2018 11:30 AM CDT   Infusion 62 with UC ONCOLOGY INFUSION, UC 12 ATC   [facility] ([facility])    [address] [place] [address]  [address]  [facility] [address]    [phone]              Who to contact     If you have questions or need follow up information about today's clinic visit or your schedule please contact [facility] directly at [phone].  Normal or non-critical lab and imaging results will be communicated to you by MyChart, letter or phone within 4 business days after the clinic has received the results. If you do not hear from us within 7 days, please contact the clinic through MyChart or phone. If you have a critical or abnormal lab result, we will notify you by phone as soon as possible.  Submit refill requests through [employer] or call your pharmacy and they will forward the refill request to us. Please allow 3 business days for your refill to be completed.          Additional Information About Your Visit        [employer] Information     [employer] gives you secure access to your electronic health record. If you see a primary care provider, you can also send messages to your care team and make appointments. If you have questions, please call your primary care clinic.  If you do not have a primary care provider, please call [phone] and they will assist you.        Care EveryWhere ID     This is your Care EveryWhere ID. This could be used by other organizations to access your [place] medical records  [id number]        Your Vitals Were     Pulse Temperature Respirations Pulse Oximetry BMI (Body Mass Index)       65 97.9  F (36.6  C) (Oral) 16 99% 33.99 kg/m2        Blood Pressure from Last 3 Encounters:   05/10/18 (!) 143/93   05/02/18 131/83   04/26/18 140/81    Weight from Last 3 Encounters:   05/10/18 95.5 kg (210 lb 9.6 oz)   05/02/18 95.3 kg (210 lb)   04/26/18 96.4 kg (212 lb 9 oz)              We Performed the Following     CBC with platelets differential     Comprehensive metabolic panel        Primary Care Provider Office Phone # Fax #    Aide Roebrtson [phone]       [address]  [address]  [facility] [address]        Equal Access to Services     [facility] : Hadii katelyn wade martin Cox, wamarcida luqadaha, qaybta kaprabhusilvia yarbroughmiriamtaylor guzmán. So [facility] [phone].    ATENCIÓN: Si habla español, tiene a tim disposición servicios gratuitos de asistencia lingüística. Marijaame al [phone].    We comply with applicable federal civil rights laws and [place] laws. We do not discriminate on the basis of race, color, national origin, age, disability, sex, sexual orientation, or gender identity.            Thank you!     Thank you for choosing [facility]  for your care. Our goal is always to provide you with excellent care. Hearing back from our patients is one way we can continue to improve our services. Please take a few minutes to complete the written survey that you may receive in the mail after your visit with us. Thank you!             Your Updated Medication List - Protect others around you: Learn how to safely use, store and throw away your medicines at www.disposemymeds.org.          This list is accurate as of 5/10/18 11:59 PM.  Always use your most recent med list.                   Brand Name Dispense Instructions for use Diagnosis    ALEVE PO      Take 220 mg by mouth every 12 hours        calcium carbonate 500 MG tablet    OS-JANAY 500 mg [place]. Ca     Take 1 tablet by mouth every evening        LORazepam 0.5 MG tablet    ATIVAN    3 tablet    Take 1 tablet (0.5 mg) by mouth as needed for anxiety (1 mg Ativan 1 hour prior. Take additional 0.5 mg 30 minutes prior if still feeling anxious.)    Anxiety       MAGNESIUM PO      Take by mouth At Bedtime        oxyCODONE IR 5 MG tablet    ROXICODONE    6 tablet    Take 1 tablet (5 mg) by mouth every 6 hours as needed for pain or other (Moderate to Severe)    Malignant melanoma of anal skin (H)       TYLENOL ARTHRITIS PAIN 650 MG CR tablet   Generic drug:  acetaminophen      Take 650 mg by mouth  every 6 hours as needed for mild pain or fever        VITAMIN D (CHOLECALCIFEROL) PO      Take by mouth every evening

## 2018-05-10 NOTE — NURSING NOTE
Chief Complaint   Patient presents with     Blood Draw     no labs ordered in epic.  vital signs taken.     No labs ordered in epic.  Vital signs taken.  Pt checked in to next appointment.  Myriam Mathis RN

## 2018-05-10 NOTE — PROGRESS NOTES
HISTORY OF PRESENT ILLNESS:  Khloe Tamez is here for a postoperative visit after undergoing a lymphatic mapping and sentinel lymph node biopsy.  She had 2 of 3 lymph nodes that were positive for metastatic melanoma.  She has seen Dr. Valentine who has recommended adjuvant immune therapy.      PHYSICAL EXAMINATION:  Both of her incisions are healing well with no evidence of infection.      IMPRESSION:  Postop check.      PLAN:  I told her that I would not recommend a femoral lymph node dissection at this time.  I think this would increase her risk of complications and delay her ultimate adjuvant therapy.  She is at risk for having recurrence in her groin but I think her distant recurrence is unfortunately even higher.  I will see her in the future if any problems arise.      cc:   Ronald Wright MD     Physicians    [address], [facility] [address]   [place] [address]      Nigel Valentine MD    [address] [place] [address]

## 2018-05-10 NOTE — NURSING NOTE
"Oncology Rooming Note    May 10, 2018 8:53 AM   Khloe Tamez is a 62 year old female who presents for:    Chief Complaint   Patient presents with     Blood Draw     no labs ordered in epic.  vital signs taken.     Oncology Clinic Visit     Return for Melanoma , lab     Initial Vitals: BP (!) 143/93 (BP Location: Right arm, Patient Position: Sitting, Cuff Size: Adult Regular)  Pulse 65  Temp 97.9  F (36.6  C) (Oral)  Resp 16  Wt 95.5 kg (210 lb 9.6 oz)  SpO2 99%  BMI 33.99 kg/m2 Estimated body mass index is 33.99 kg/(m^2) as calculated from the following:    Height as of 5/2/18: 1.676 m (5' 6\").    Weight as of this encounter: 95.5 kg (210 lb 9.6 oz). Body surface area is 2.11 meters squared.  No Pain (0) Comment: Data Unavailable   No LMP recorded. Patient is postmenopausal.  Allergies reviewed: Yes  Medications reviewed: Yes    Medications: Medication refills not needed today.  Pharmacy name entered into [facility]: [place], [place]    Clinical concerns: results  Murphy was notified.    6  minutes for nursing intake (face to face time)     Dipika Marion MA              "

## 2018-05-10 NOTE — MR AVS SNAPSHOT
After Visit Summary   5/10/2018    Khloe Tamez    MRN: [id number]           Patient Information     Date Of Birth          1955        Visit Information        Provider Department      5/10/2018 9:15 AM Adams Sahh MD [facility]        Today's Diagnoses     Melanoma of anus (H)    -  1       Follow-ups after your visit        Future tests that were ordered for you today     Open Standing Orders        Priority Remaining Interval Expires Ordered    CBC with platelets differential Routine 9/10 Every 3 Months 5/9/2019 5/10/2018    Comprehensive metabolic panel Routine 9/10 Every 3 Months 5/9/2019 5/10/2018            Who to contact     If you have questions or need follow up information about today's clinic visit or your schedule please contact [facility] directly at [phone].  Normal or non-critical lab and imaging results will be communicated to you by MyChart, letter or phone within 4 business days after the clinic has received the results. If you do not hear from us within 7 days, please contact the clinic through MyChart or phone. If you have a critical or abnormal lab result, we will notify you by phone as soon as possible.  Submit refill requests through [employer] or call your pharmacy and they will forward the refill request to us. Please allow 3 business days for your refill to be completed.          Additional Information About Your Visit        MyChart Information     [employer] gives you secure access to your electronic health record. If you see a primary care provider, you can also send messages to your care team and make appointments. If you have questions, please call your primary care clinic.  If you do not have a primary care provider, please call [phone] and they will assist you.        Care EveryWhere ID     This is your Care EveryWhere ID. This could be used by other organizations to access your [place] medical records  [id number]          Blood Pressure from Last 3 Encounters:   05/10/18 (!) 143/93   05/02/18 131/83   04/26/18 140/81    Weight from Last 3 Encounters:   05/10/18 95.5 kg (210 lb 9.6 oz)   05/02/18 95.3 kg (210 lb)   04/26/18 96.4 kg (212 lb 9 oz)              Today, you had the following     No orders found for display       Primary Care Provider Office Phone # Fax #    Aide Robertson [phone]       [facility] [address] [address]        Equal Access to Services     [facility] : Hadii katelyn Cox, wamarcida paulina, qaybta kaalmada marco, silvia fierro . So [facility] [phone].    ATENCIÓN: Si habla español, tiene a tim disposición servicios gratuitos de asistencia lingüística. Llame al [phone].    We comply with applicable federal civil rights laws and [place] laws. We do not discriminate on the basis of race, color, national origin, age, disability, sex, sexual orientation, or gender identity.            Thank you!     Thank you for choosing [facility]  for your care. Our goal is always to provide you with excellent care. Hearing back from our patients is one way we can continue to improve our services. Please take a few minutes to complete the written survey that you may receive in the mail after your visit with us. Thank you!             Your Updated Medication List - Protect others around you: Learn how to safely use, store and throw away your medicines at www.disposemymeds.org.          This list is accurate as of 5/10/18 11:59 PM.  Always use your most recent med list.                   Brand Name Dispense Instructions for use Diagnosis    ALEVE PO      Take 220 mg by mouth every 12 hours        calcium carbonate 500 MG tablet    OS-JANAY 500 mg [place]. Ca     Take 1 tablet by mouth every evening        LORazepam 0.5 MG tablet    ATIVAN    3 tablet    Take 1 tablet (0.5 mg) by mouth as needed for anxiety (1 mg Ativan 1  hour prior. Take additional 0.5 mg 30 minutes prior if still feeling anxious.)    Anxiety       MAGNESIUM PO      Take by mouth At Bedtime        oxyCODONE IR 5 MG tablet    ROXICODONE    6 tablet    Take 1 tablet (5 mg) by mouth every 6 hours as needed for pain or other (Moderate to Severe)    Malignant melanoma of anal skin (H)       TYLENOL ARTHRITIS PAIN 650 MG CR tablet   Generic drug:  acetaminophen      Take 650 mg by mouth every 6 hours as needed for mild pain or fever        VITAMIN D (CHOLECALCIFEROL) PO      Take by mouth every evening

## 2018-05-28 NOTE — PROGRESS NOTES
[facility]  MEDICAL ONCOLOGY CONSULT  May 10, 2018    CHIEF COMPLAINT: Perianal melanoma    Melanoma History:.  1. 3/27/18 patient had perianal lesion biopsied in [place] ([id number]). This showed malignant melanoma, positive for HMB45 and S100, and negative for CD45 and pancytokeratin. Breslow depth at least 3.5 mm, non-ulcerated, with 5 mitoses/mm2, TILs present with deep and peripheral margins positive; pT3a.  2. 3/29/18, PET-CT showed a perianal FDG avid lesion (SUV max 10.8) and a superficial left inguinal lymph node measuring 0.9 cm (SUV max 4.1).  3. 4/13/18, she has wide local excision of a 2 x 2.5 cm perianal melanoma with anaplastic closure, under Dr. Wright. Pathology showed a nodular melanoma, Breslow depth 6.0 mm to mark's level IV, there were more than 10 mitoses per mm2, with ulceration, and lymphovascular invasion present. No perineural invasion or microsatellitosis. Tumor infiltrating lymphocytes present, non-brisk. AJCC microstage: pT4b.  4. 4/26/18, she has MRI brain, which is negative for metastatic disease.  5. 5/2/18, she returns to OR under Dr. Shah for left femoral sentinel lymph node biopsy, with 2 (of 3) involved lymph nodes. IHC positive for Melan-A, tyrosinase, and HMB-45. No definite extranodal extension.      HISTORY OF PRESENT ILLNESS  Khloe Tamez is a 62 year old female from [place], [place] with resected perianal melanoma. She presents after sentinel lymph node biopsy for consideration of next steps. She has recovered well from her surgeries, denies cough, shortness of breath, nausea, vomiting, or abdominal pain. She has had no fevers or chills. No new palpable lumps or bumps. No new neurologic complaints. KPS 90.    REVIEW OF SYSTEMS  A 12-point ROS negative except as in HPI.    Past Medical History:   Diagnosis Date     Melanoma malignant, perianal skin (H)      Obesity (BMI 30.0-34.9)      Osteoarthritis      Past Surgical History:   Procedure Laterality  Date     BIOPSY NODE SENTINEL Left 2018    Procedure: BIOPSY NODE SENTINEL;  [place] Lymph Node Biopsy x3, Lymphatic mapping;  Surgeon: Adams Shah MD;  [facility]      SECTION       COLONOSCOPY       EXAM UNDER ANESTHESIA ANUS N/A 2018    Procedure: EXAM UNDER ANESTHESIA ANUS;  Examination Under Anesthesia Anus, Local Excision of Perianal Melanoma;  Surgeon: Ronald Wright MD;  [facility]     EXCISE LESION RECTUM N/A 2018    Procedure: EXCISE LESION RECTUM;;  Surgeon: Ronald Wright MD;  [facility]       SOCIAL HISTORY  She has worked as a caregiver, currently works at assisted living facility. Lifelong nonsmoker. No significant alcohol. She has twins.      FAMILY HISTORY  Father had a skin cancer, unsure if melanoma. Paternal aunt had stomach cancer.    PHYSICAL EXAMINATION  BP (!) 143/93 (BP Location: Right arm, Patient Position: Sitting, Cuff Size: Adult Regular)  Pulse 65  Temp 97.9  F (36.6  C) (Oral)  Resp 16  Wt 95.5 kg (210 lb 9.6 oz)  SpO2 99%  BMI 33.99 kg/m2  Wt Readings from Last 2 Encounters:   05/10/18 95.5 kg (210 lb 9.6 oz)   18 95.3 kg (210 lb)     Physical Exam   Constitutional: She is oriented to person, place, and time. She appears well-developed and well-nourished.   HENT:   Mouth/Throat: Oropharynx is clear and moist.   Eyes: EOM are normal. Pupils are equal, round, and reactive to light. No scleral icterus.   Neck: Normal range of motion. Neck supple.   Cardiovascular: Regular rhythm.    Pulmonary/Chest: Effort normal and breath sounds normal. She has no wheezes.   Abdominal: Soft. She exhibits no mass.   Musculoskeletal: Normal range of motion. She exhibits no tenderness.   Lymphadenopathy:     She has no cervical adenopathy.     She has no axillary adenopathy.        Right: No inguinal and no supraclavicular adenopathy present.        Left: No inguinal and no supraclavicular adenopathy present.   Neurological: She is alert and oriented  to person, place, and time. No cranial nerve deficit.   Skin: Skin is warm and dry. No rash noted. No pallor.   Psychiatric: She has a normal mood and affect.   Nursing note and vitals reviewed.      ASSESSMENT AND PLAN  #1 Resected Stage IIIC nodular melanoma, pT4b pN2b, perianal primary  It was a pleasure to meet Ms. Tamez today. She is a 62 year old woman with resected perianal melanoma. She had an imaging detectable 0.9 mm FDG avid lymph node in the sentinel node and this returned positive. In total 2 of 3 lymph nodes were involved.    I again reviewed adjuvant checkpoint inhibitor therapy as my recommendation for further therapy. Adjuvant nivolumab has been shown to improve outcomes for patients with stage III melanoma. After complete resection 12-month RFS was 70.5% for nivolumab and 60.8% for ipilimumab in the Checkmate-238, versus 56% in the placebo group of the [id number] study.     We will plan to send [facility] molecular studies and PD-L1 expression testing on the wide local specimen. The order has been placed and is showing up as needing to receive specimen. We will follow-up on this.     In the meantime she will likely try to receive therapy closer to home. Otherwise, I have recommended she begin adjuvant nivolumab here later this month.    Multiple questions answered.    Nigel Person M.D.   of Medicine  Hematology, Oncology and Transplantation

## 2018-05-31 NOTE — LETTER
5/31/2018       RE: Khloe Tamez  [address] [facility] [address]     Dear Colleague,    Thank you for referring your patient, Khloe Tamez, to the [facility]. Please see a copy of my visit note below.    [facility]  MEDICAL ONCOLOGY PROGRESS NOTE  May 31, 2018    CHIEF COMPLAINT: Perianal melanoma    Melanoma History:.  1. 3/27/18 patient had perianal lesion biopsied in [place] ([id number]). This showed malignant melanoma, positive for HMB45 and S100, and negative for CD45 and pancytokeratin. Breslow depth at least 3.5 mm, non-ulcerated, with 5 mitoses/mm2, TILs present with deep and peripheral margins positive; pT3a.  2. 3/29/18, PET-CT showed a perianal FDG avid lesion (SUV max 10.8) and a superficial left inguinal lymph node measuring 0.9 cm (SUV max 4.1).  3. 4/13/18, she has wide local excision of a 2 x 2.5 cm perianal melanoma with anaplastic closure, under Dr. Wright. Pathology showed a nodular melanoma, Breslow depth 6.0 mm to mark's level IV, there were more than 10 mitoses per mm2, with ulceration, and lymphovascular invasion present. No perineural invasion or microsatellitosis. Tumor infiltrating lymphocytes present, non-brisk. AJCC microstage: pT4b.  4. 4/26/18, she has MRI brain, which is negative for metastatic disease.  5. 5/2/18, she returns to OR under Dr. Shah for left femoral sentinel lymph node biopsy, with 2 (of 3) involved lymph nodes. IHC positive for Melan-A, tyrosinase, and HMB-45. No definite extranodal extension.      HISTORY OF PRESENT ILLNESS  Khloe Tamez is a 62 year old female from [place], [place] with resected perianal melanoma. She presents today to start adjuvant nivolumab for resected perianal melanoma with lymph node involvement. Today she feels well, but in the last week she has noticed a firmness in the left groin. The firm area feels like a lymph node to her. Otherwise, she had recovered well from her surgeries and  did not require drain placement. She denies cough, shortness of breath, nausea, vomiting, or abdominal pain. She has had no fevers or chills. No new palpable lumps or bumps. No new neurologic complaints. KPS 90.    REVIEW OF SYSTEMS  A 12-point ROS negative except as in HPI.    Past Medical History:   Diagnosis Date     Melanoma malignant, perianal skin (H)      Obesity (BMI 30.0-34.9)      Osteoarthritis      Past Surgical History:   Procedure Laterality Date     BIOPSY NODE SENTINEL Left 2018    Procedure: BIOPSY NODE SENTINEL;  [place] Lymph Node Biopsy x3, Lymphatic mapping;  Surgeon: Adams Shah MD;  [facility]      SECTION       COLONOSCOPY       EXAM UNDER ANESTHESIA ANUS N/A 2018    Procedure: EXAM UNDER ANESTHESIA ANUS;  Examination Under Anesthesia Anus, Local Excision of Perianal Melanoma;  Surgeon: Ronald Wright MD;  [facility]     EXCISE LESION RECTUM N/A 2018    Procedure: EXCISE LESION RECTUM;;  Surgeon: Ronald Wright MD;  [facility]       SOCIAL HISTORY  She has worked as a caregiver, currently works at assisted living facility. Lifelong nonsmoker. No significant alcohol. She has twins.      FAMILY HISTORY  Father had a skin cancer, unsure if melanoma. Paternal aunt had stomach cancer.    PHYSICAL EXAMINATION  BP (!) 161/94 (BP Location: Right arm, Patient Position: Sitting, Cuff Size: Adult Regular)  Pulse 74  Temp 98  F (36.7  C) (Oral)  Resp 18  Wt 94.1 kg (207 lb 8 oz)  SpO2 99%  BMI 33.49 kg/m2    Physical Exam   Constitutional: She is oriented to person, place, and time. She appears well-developed and well-nourished.   HENT:   Mouth/Throat: Oropharynx is clear and moist.   Eyes: EOM are normal. Pupils are equal, round, and reactive to light. No scleral icterus.   Neck: Normal range of motion. Neck supple.   Cardiovascular: Regular rhythm.    Pulmonary/Chest: Effort normal and breath sounds normal. She has no wheezes.   Abdominal: Soft. She  exhibits no mass.   Musculoskeletal: Normal range of motion. She exhibits no tenderness.   Lymphadenopathy:     She has no cervical adenopathy.        Right: No inguinal and no supraclavicular adenopathy present.        Left: Inguinal adenopathy present. No supraclavicular adenopathy present.   Neurological: She is alert and oriented to person, place, and time. No cranial nerve deficit.   Skin: Skin is warm and dry. No rash noted. No pallor.   Psychiatric: She has a normal mood and affect.   Nursing note and vitals reviewed.      ASSESSMENT AND PLAN  #1 Resected Stage IIIC nodular melanoma, pT4b pN2b, perianal primary  It was a pleasure to see Ms. Tamez today. She is a 62 year old woman with resected perianal melanoma. We will proceed with adjuvant nivolumab every 2 weeks for at least 1 cycle, with plan to transition to q4week administration for duration of therapy of 1 year.     We briefly reviewed the expected toxicities include hypothyroidism in about 15% of patients. There is also a possibility diarrhea or pneumonitis, though typically in less than 10% of patients. FoundationOne molecular studies and PD-L1 expression placed and currently pending.    We will obtain a new baseline PET-CT scan, which will also help us assess the left groin when she returns in 2 weeks. She will get in touch were she to experience any new or worsening symptoms.    Multiple questions answered.    Nigel Person M.D.   of Medicine  Hematology, Oncology and Transplantation    Again, thank you for allowing me to participate in the care of your patient.      Sincerely,    Nigel Valentine MD

## 2018-05-31 NOTE — MR AVS SNAPSHOT
After Visit Summary   5/31/2018    Khloe Tamez    MRN: [id number]           Patient Information     Date Of Birth          1955        Visit Information        Provider Department      5/31/2018 11:00 AM  18 ATC;  ONCOLOGY INFUSION [facility]        Today's Diagnoses     Melanoma of anus (H)    -  1      Care Instructions    Contact Numbers    [facility] [place]: [phone]  [facility] Triage and after hours / weekends / holidays:  [phone]      Please call the triage or after hours line if you experience a temperature greater than or equal to 100.5, shaking chills, have uncontrolled nausea, vomiting and/or diarrhea, dizziness, shortness of breath, chest pain, bleeding, unexplained bruising, or if you have any other new/concerning symptoms, questions or concerns.      If you are having any concerning symptoms or wish to speak to a provider before your next infusion visit, please call your care coordinator or triage to notify them so we can adequately serve you.     If you need a refill on a narcotic prescription or other medication, please call before your infusion appointment.           May 2018   [place] Monday Tuesday Wednesday Thursday Friday Saturday             1     2     NM LYMPHOSCIN INJ & SCAN    8:00 AM   (120 min.)   [id number]   [facility] [place], Nuclear Medicine     Outpatient Visit   10:07 AM   [facility]     BIOPSY NODE SENTINEL   12:25 PM   Adams Shah MD    OR 3     4     5       6     7     8     9     10     [facility] MASONIC LAB DRAW    7:45 AM   (15 min.)    MASONIC LAB DRAW   [facility] Draw     [facility] RETURN    8:15 AM   (30 min.)   Nigel Gauthier MD   [facility] POST-OP    9:00 AM   (15 min.)   Adams Shah MD   [facility] 11     12       13     14     15     16     17     18     19       20     21     22     23     24     25     26       27     28     29     30      31     [facility] LAB DRAW    7:30 AM   (15 min.)    M[facility] LAB DRAW   [facility] Masonic Lab Draw     [facility] RETURN    7:45 AM   (30 min.)   Nigel Gauthier MD   [facility] 60   11:00 AM   (60 min.)   [facility] ONCOL[facility]Y INFUSION   [facility]                      June 2018 Sunday Monday Tuesday Wednesday Thursday Friday Saturday                            1     2       3     4     5     6     7     8     9       10     11     12     13     14     [facility] LAB DRAW   10:15 AM   (15 min.)    [facility] LAB DRAW   [facility] Mason[facility] Lab Draw     [facility] RETURN   10:45 AM   (30 min.)   Nigel Gauthier MD   [facility] ONC INFUSION 60   11:30 AM   (60 min.)    ONCOLOGY INFUSION   [facility] 15     16       17     18     19     20     21     22     23       24     25     26     27     28     29     30                  Lab Results:  Recent Results (from the past 12 hour(s))   Comprehensive metabolic panel    Collection Time: 05/31/18  7:38 AM   Result Value Ref Range    Sodium 142 133 - 144 mmol/L    Potassium 3.8 3.4 - 5.3 mmol/L    Chloride 109 94 - 109 mmol/L    Carbon Dioxide 25 20 - 32 mmol/L    Anion Gap 8 3 - 14 mmol/L    Glucose 95 70 - 99 mg/dL    Urea Nitrogen 19 7 - 30 mg/dL    Creatinine 0.88 0.52 - 1.04 mg/dL    GFR Estimate 66 >60 mL/min/1.7m2    GFR Estimate If Black 80 >60 mL/min/1.7m2    Calcium 8.8 8.5 - 10.1 mg/dL    Bilirubin Total 0.4 0.2 - 1.3 mg/dL    Albumin 3.6 3.4 - 5.0 g/dL    Protein Total 7.3 6.8 - 8.8 g/dL    Alkaline Phosphatase 107 40 - 150 U/L    ALT 27 0 - 50 U/L    AST 15 0 - 45 U/L   TSH with free T4 reflex    Collection Time: 05/31/18  7:38 AM   Result Value Ref Range    TSH 2.39 0.40 - 4.00 mU/L               Follow-ups after your visit        Your next 10 appointments already scheduled     Jun 14, 2018 10:15 AM [facility]   M[facility]ic Lab Draw with  MASONIC LAB DRAW   [facility] ([facility])    [address]  [address]  [place] [id number] [phone]            Jun 14, 2018 11:00 AM CDT   (Arrive by 10:45 AM)   Return Visit with Nigel Valentine MD   [facility] ([facility])    [address]  [address]  [place] [id number] [phone]            Jun 14, 2018 11:30 AM CDT   Infusion 60 with [facility] ONCOLOGY INFUSION, [facility] 12 ATC   [facility] ([facility])    [address]  [address]  [place] [id number] [phone]              Future tests that were ordered for you today     Open Future Orders        Priority Expected Expires Ordered    PET Oncology Whole Body Routine  5/31/2019 5/31/2018            Who to contact     If you have questions or need follow up information about today's clinic visit or your schedule please contact [facility] CANCER [facility] directly at [phone].  Normal or non-critical lab and imaging results will be communicated to you by [email or web address], letter or phone within 4 business days after the clinic has received the results. If you do not hear from us within 7 days, please contact the clinic through [employer] or phone. If you have a critical or abnormal lab result, we will notify you by phone as soon as possible.  Submit refill requests through [employer] or call your pharmacy and they will forward the refill request to us. Please allow 3 business days for your refill to be completed.          Additional Information About Your Visit        [email or web address] Information     [employer] gives you secure access to your electronic health record. If you see a primary care provider, you can also send messages to your care team and make appointments. If you have questions, please call your primary care clinic.  If you do not have a primary care provider, please call [phone] and they will assist you.        Care  EveryWhere ID     This is your Care EveryWhere ID. This could be used by other organizations to access your [facility] medical records  [id number]         Blood Pressure from Last 3 Encounters:   05/31/18 (!) 161/94   05/10/18 (!) 143/93   05/02/18 131/83    Weight from Last 3 Encounters:   05/31/18 94.1 kg (207 lb 8 oz)   05/10/18 95.5 kg (210 lb 9.6 oz)   05/02/18 95.3 kg (210 lb)              We Performed the Following     Comprehensive metabolic panel     TSH with free T4 reflex          Today's Medication Changes          These changes are accurate as of 5/31/18 12:08 PM.  If you have any questions, ask your nurse or doctor.               Start taking these medicines.        Dose/Directions    prochlorperazine 10 MG tablet   Commonly known as:  COMPAZINE   Used for:  Melanoma of anus (H)        Dose:  10 mg   Take 1 tablet (10 mg) by mouth every 6 hours as needed (Nausea/Vomiting)   Quantity:  30 tablet   Refills:  3         These medicines have changed or have updated prescriptions.        Dose/Directions    * LORazepam 0.5 MG tablet   Commonly known as:  ATIVAN   This may have changed:  You were already taking a medication with the same name, and this prescription was added. Make sure you understand how and when to take each.   Used for:  Melanoma of anus (H)        Dose:  0.5 mg   Take 1 tablet (0.5 mg) by mouth every 4 hours as needed (Anxiety, Nausea/Vomiting or Sleep)   Quantity:  30 tablet   Refills:  3       * LORazepam 0.5 MG tablet   Commonly known as:  ATIVAN   This may have changed:  Another medication with the same name was added. Make sure you understand how and when to take each.   Used for:  Anxiety        Dose:  0.5 mg   Take 1 tablet (0.5 mg) by mouth as needed for anxiety (1 mg Ativan 1 hour prior. Take additional 0.5 mg 30 minutes prior if still feeling anxious.)   Quantity:  20 tablet   Refills:  0       * Notice:  This list has 2 medication(s) that are the same as other medications  prescribed for you. Read the directions carefully, and ask your doctor or other care provider to review them with you.         Where to get your medicines      These medications were sent to [place], [place] - [address]  [address], [facility] [address]    Hours:  TRANSPLANT PHONE NUMBER [phone] Phone:  [phone]     prochlorperazine 10 MG tablet         Some of these will need a paper prescription and others can be bought over the counter.  Ask your nurse if you have questions.     Bring a paper prescription for each of these medications     LORazepam 0.5 MG tablet    LORazepam 0.5 MG tablet                Primary Care Provider Office Phone # Fax #    Aide Robertson [phone]       [facility] [address] [address]        Equal Access to Services     SHELIA WALKER : Hadii aad ku hadasho Soomaali, waaxda luqadaha, qaybta kaalmada adeclaudioyada, silvia fierro . So [facility] [phone].    ATENCIÓN: Si habla español, tiene a tim disposición servicios gratuitos de asistencia lingüística. Samantha al [phone].    We comply with applicable federal civil rights laws and [place] laws. We do not discriminate on the basis of race, color, national origin, age, disability, sex, sexual orientation, or gender identity.            Thank you!     Thank you for choosing [facility]  for your care. Our goal is always to provide you with excellent care. Hearing back from our patients is one way we can continue to improve our services. Please take a few minutes to complete the written survey that you may receive in the mail after your visit with us. Thank you!             Your Updated Medication List - Protect others around you: Learn how to safely use, store and throw away your medicines at www.disposemymeds.org.          This list is accurate as of 5/31/18 12:08 PM.   Always use your most recent med list.                   Brand Name Dispense Instructions for use Diagnosis    ALEVE PO      Take 220 mg by mouth every 12 hours        calcium carbonate 500 MG tablet    OS-JANAY 500 mg [place]. Ca     Take 1 tablet by mouth every evening        Calcium Oyster Shell 1250 (500 Ca) MG Tabs           * LORazepam 0.5 MG tablet    ATIVAN    30 tablet    Take 1 tablet (0.5 mg) by mouth every 4 hours as needed (Anxiety, Nausea/Vomiting or Sleep)    Melanoma of anus (H)       * LORazepam 0.5 MG tablet    ATIVAN    20 tablet    Take 1 tablet (0.5 mg) by mouth as needed for anxiety (1 mg Ativan 1 hour prior. Take additional 0.5 mg 30 minutes prior if still feeling anxious.)    Anxiety       MAGNESIUM PO      Take by mouth At Bedtime        METAMUCIL PO      Take 1 packet by mouth        oxyCODONE IR 5 MG tablet    ROXICODONE    6 tablet    Take 1 tablet (5 mg) by mouth every 6 hours as needed for pain or other (Moderate to Severe)    Malignant melanoma of anal skin (H)       prochlorperazine 10 MG tablet    COMPAZINE    30 tablet    Take 1 tablet (10 mg) by mouth every 6 hours as needed (Nausea/Vomiting)    Melanoma of anus (H)       TYLENOL ARTHRITIS PAIN 650 MG CR tablet   Generic drug:  acetaminophen      Take 650 mg by mouth every 6 hours as needed for mild pain or fever        VITAMIN D (CHOLECALCIFEROL) PO      Take by mouth every evening        * Notice:  This list has 2 medication(s) that are the same as other medications prescribed for you. Read the directions carefully, and ask your doctor or other care provider to review them with you.

## 2018-05-31 NOTE — PROGRESS NOTES
[facility]  MEDICAL ONCOLOGY PROGRESS NOTE  May 31, 2018    CHIEF COMPLAINT: Perianal melanoma    Melanoma History:.  1. 3/27/18 patient had perianal lesion biopsied in [place] ([id number]). This showed malignant melanoma, positive for HMB45 and S100, and negative for CD45 and pancytokeratin. Breslow depth at least 3.5 mm, non-ulcerated, with 5 mitoses/mm2, TILs present with deep and peripheral margins positive; pT3a.  2. 3/29/18, PET-CT showed a perianal FDG avid lesion (SUV max 10.8) and a superficial left inguinal lymph node measuring 0.9 cm (SUV max 4.1).  3. 4/13/18, she has wide local excision of a 2 x 2.5 cm perianal melanoma with anaplastic closure, under Dr. Wright. Pathology showed a nodular melanoma, Breslow depth 6.0 mm to mark's level IV, there were more than 10 mitoses per mm2, with ulceration, and lymphovascular invasion present. No perineural invasion or microsatellitosis. Tumor infiltrating lymphocytes present, non-brisk. AJCC microstage: pT4b.  4. 4/26/18, she has MRI brain, which is negative for metastatic disease.  5. 5/2/18, she returns to OR under Dr. Shha for left femoral sentinel lymph node biopsy, with 2 (of 3) involved lymph nodes. IHC positive for Melan-A, tyrosinase, and HMB-45. No definite extranodal extension.      HISTORY OF PRESENT ILLNESS  Khloe Tamez is a 62 year old female from [place], [place] with resected perianal melanoma. She presents today to start adjuvant nivolumab for resected perianal melanoma with lymph node involvement. Today she feels well, but in the last week she has noticed a firmness in the left groin. The firm area feels like a lymph node to her. Otherwise, she had recovered well from her surgeries and did not require drain placement. She denies cough, shortness of breath, nausea, vomiting, or abdominal pain. She has had no fevers or chills. No new palpable lumps or bumps. No new neurologic complaints. KPS 90.    REVIEW OF SYSTEMS  A  12-point ROS negative except as in HPI.    Past Medical History:   Diagnosis Date     Melanoma malignant, perianal skin (H)      Obesity (BMI 30.0-34.9)      Osteoarthritis      Past Surgical History:   Procedure Laterality Date     BIOPSY NODE SENTINEL Left 2018    Procedure: BIOPSY NODE SENTINEL;  [place] Lymph Node Biopsy x3, Lymphatic mapping;  Surgeon: Adams Shah MD;  [facility]      SECTION       COLONOSCOPY       EXAM UNDER ANESTHESIA ANUS N/A 2018    Procedure: EXAM UNDER ANESTHESIA ANUS;  Examination Under Anesthesia Anus, Local Excision of Perianal Melanoma;  Surgeon: Ronald Wright MD;  [facility]     EXCISE LESION RECTUM N/A 2018    Procedure: EXCISE LESION RECTUM;;  Surgeon: Ronald Wright MD;  [facility]       SOCIAL HISTORY  She has worked as a caregiver, currently works at assisted living facility. Lifelong nonsmoker. No significant alcohol. She has twins.      FAMILY HISTORY  Father had a skin cancer, unsure if melanoma. Paternal aunt had stomach cancer.    PHYSICAL EXAMINATION  BP (!) 161/94 (BP Location: Right arm, Patient Position: Sitting, Cuff Size: Adult Regular)  Pulse 74  Temp 98  F (36.7  C) (Oral)  Resp 18  Wt 94.1 kg (207 lb 8 oz)  SpO2 99%  BMI 33.49 kg/m2    Physical Exam   Constitutional: She is oriented to person, place, and time. She appears well-developed and well-nourished.   HENT:   Mouth/Throat: Oropharynx is clear and moist.   Eyes: EOM are normal. Pupils are equal, round, and reactive to light. No scleral icterus.   Neck: Normal range of motion. Neck supple.   Cardiovascular: Regular rhythm.    Pulmonary/Chest: Effort normal and breath sounds normal. She has no wheezes.   Abdominal: Soft. She exhibits no mass.   Musculoskeletal: Normal range of motion. She exhibits no tenderness.   Lymphadenopathy:     She has no cervical adenopathy.        Right: No inguinal and no supraclavicular adenopathy present.        Left: Inguinal  adenopathy present. No supraclavicular adenopathy present.   Neurological: She is alert and oriented to person, place, and time. No cranial nerve deficit.   Skin: Skin is warm and dry. No rash noted. No pallor.   Psychiatric: She has a normal mood and affect.   Nursing note and vitals reviewed.      ASSESSMENT AND PLAN  #1 Resected Stage IIIC nodular melanoma, pT4b pN2b, perianal primary  It was a pleasure to see Ms. Tamez today. She is a 62 year old woman with resected perianal melanoma. We will proceed with adjuvant nivolumab every 2 weeks for at least 1 cycle, with plan to transition to q4week administration for duration of therapy of 1 year.     We briefly reviewed the expected toxicities include hypothyroidism in about 15% of patients. There is also a possibility diarrhea or pneumonitis, though typically in less than 10% of patients. FoundationOne molecular studies and PD-L1 expression placed and currently pending.    We will obtain a new baseline PET-CT scan, which will also help us assess the left groin when she returns in 2 weeks. She will get in touch were she to experience any new or worsening symptoms.    Multiple questions answered.    Nigel Person M.D.   of Medicine  Hematology, Oncology and Transplantation

## 2018-05-31 NOTE — PROGRESS NOTES
Infusion Nursing Note:  Khloe Tamez presents today for cycle 1, day 1 Nivolumab.    Patient seen by provider today: Yes: Dr. Valentine   present during visit today: Not Applicable.    Note: Patient is new to the infusion room today and is receiving Nivolumab for the first time.  Patient oriented to infusion room, location of bathrooms and nutrition stations, and call light.  Verified that patient recieved written chemotherapy information previously.  Verbally reviewed chemotherapy teaching, side effects, take-home medications, and follow-up schedule with patient. Patient instructed to call triage with any questions or if he experiences a temperature >100.5, shaking chills, uncontrolled nausea/vomiting/diarrhea, dizziness, shortness of breath, bleeding not relieved with pressure, or with any other concerns.  Instructed patient to call the after hours nurse line or [phone] on nights/weekends/holidays.    Patient reported not feeling any different since seeing Dr. Murphy Valentine in clinic. Will proceed with treatment.    Intravenous Access:  Peripheral IV placed.    Treatment Conditions:  Lab Results   Component Value Date     05/31/2018                   Lab Results   Component Value Date    POTASSIUM 3.8 05/31/2018           No results found for: MAG         Lab Results   Component Value Date    CR 0.88 05/31/2018                   Lab Results   Component Value Date    JANAY 8.8 05/31/2018                Lab Results   Component Value Date    BILITOTAL 0.4 05/31/2018           Lab Results   Component Value Date    ALBUMIN 3.6 05/31/2018                    Lab Results   Component Value Date    ALT 27 05/31/2018           Lab Results   Component Value Date    AST 15 05/31/2018     Results reviewed, labs MET treatment parameters, ok to proceed with treatment.    Post Infusion Assessment:  Patient tolerated infusion without incident.  Blood return noted pre and post infusion.  Site patent and  intact, free from redness, edema or discomfort.  No evidence of extravasations.  Access discontinued per protocol.    Discharge Plan:   Prescription refills given for prochlorperazine and lorazepam.  Discharge instructions reviewed with: Patient.  Patient and/or family verbalized understanding of discharge instructions and all questions answered.  Copy of AVS reviewed with patient and/or family.  Patient will return 6/14/2018 for next appointment.  Patient discharged in stable condition accompanied by: .  Departure Mode: Ambulatory.    Nickolas Siddiqui RN

## 2018-05-31 NOTE — PATIENT INSTRUCTIONS
Preventive Care:    Colorectal Cancer Screening: During our visit today, we discussed that it is recommended you receive colorectal cancer screening. Please call or make an appointment with your primary care provider to discuss this when you are ready. You may also call the [facility] scheduling line ([phone]) to set up a colonoscopy appointment.    Thank you,   VIRI Good ([facility])

## 2018-05-31 NOTE — NURSING NOTE
"Oncology Rooming Note    May 31, 2018 8:03 AM   Khloe Tamez is a 62 year old female who presents for:    Chief Complaint   Patient presents with     Blood Draw     Right arm butterfly  X 1, labs drawn and sent, vitals completed, checked into next appointment.     Oncology Clinic Visit     Melanoma of Anus; Starting tx today     Initial Vitals: BP (!) 161/94 (BP Location: Right arm, Patient Position: Sitting, Cuff Size: Adult Regular)  Pulse 74  Temp 98  F (36.7  C) (Oral)  Resp 18  Wt 94.1 kg (207 lb 8 oz)  SpO2 99%  BMI 33.49 kg/m2 Estimated body mass index is 33.49 kg/(m^2) as calculated from the following:    Height as of 5/2/18: 1.676 m (5' 6\").    Weight as of this encounter: 94.1 kg (207 lb 8 oz). Body surface area is 2.09 meters squared.  No Pain (0) Comment: Data Unavailable   No LMP recorded. Patient is postmenopausal.  Allergies reviewed: Yes  Medications reviewed: Yes    Medications: Medication refills not needed today.  Pharmacy name entered into [facility]: [place], [place]    Clinical concerns: Pt here for first infusion today.  Dr Valentine was notified.    8 minutes for nursing intake (face to face time)     Latisha Chou CMA              "

## 2018-05-31 NOTE — MR AVS SNAPSHOT
After Visit Summary   5/31/2018    Khloe Tamez    MRN: [id number]           Patient Information     Date Of Birth          1955        Visit Information        Provider Department      5/31/2018 8:00 AM Nigel Gauthier MD [facility]        Today's Diagnoses     Melanoma of anus (H)    -  1    Anxiety          Care Instructions    Preventive Care:    Colorectal Cancer Screening: During our visit today, we discussed that it is recommended you receive colorectal cancer screening. Please call or make an appointment with your primary care provider to discuss this when you are ready. You may also call the [facility] scheduling line ([phone]) to set up a colonoscopy appointment.    Thank you,   VIRI Good ([facility])            Follow-ups after your visit        Your next 10 appointments already scheduled     May 31, 2018 11:00 AM CDT   Infusion 62 with UC ONCOLOGY INFUSION, UC 18 ATC   [facility] ([facility])    [address] [place] [address]  [address]  [facility] [address]   [phone]            Jun 14, 2018 10:15 AM CDT   [facility] Lab Draw with  [facility] DRAW   [facility] Lab Draw ([facility])    [address] [place] [address]  [address]  [facility] [address]   [phone]            Jun 14, 2018 11:00 AM CDT   (Arrive by 10:45 AM)   Return Visit with Nigel Valentine MD   [facility] ([facility])    [address] [place] [address]  [address]  [facility] [address]   [phone]            Jun 14, 2018 11:30 AM CDT   Infusion 62 with UC ONCOLOGY INFUSION, UC 12 ATC   [facility] ([facility])    [address] [place] [address]  [address]  [facility] [address]   [phone]              Future tests that were ordered for you today     Open Future Orders        Priority Expected Expires Ordered    PET Oncology Whole  Body Routine  5/31/2019 5/31/2018            Who to contact     If you have questions or need follow up information about today's clinic visit or your schedule please contact [facility] directly at [phone].  Normal or non-critical lab and imaging results will be communicated to you by [facility] [employer], letter or phone within 4 business days after the clinic has received the results. If you do not hear from us within 7 days, please contact the clinic through [facility] or phone. If you have a critical or abnormal lab result, we will notify you by phone as soon as possible.  Submit refill requests through [employer] or call your pharmacy and they will forward the refill request to us. Please allow 3 business days for your refill to be completed.          Additional Information About Your Visit        [facility] [employer] Information     [employer] gives you secure access to your electronic health record. If you see a primary care provider, you can also send messages to your care team and make appointments. If you have questions, please call your primary care clinic.  If you do not have a primary care provider, please call [phone] and they will assist you.        Care EveryWhere ID     This is your Care EveryWhere ID. This could be used by other organizations to access your [place] medical records  [id number]        Your Vitals Were     Pulse Temperature Respirations Pulse Oximetry BMI (Body Mass Index)       74 98  F (36.7  C) (Oral) 18 99% 33.49 kg/m2        Blood Pressure from Last 3 Encounters:   05/31/18 (!) 161/94   05/10/18 (!) 143/93   05/02/18 131/83    Weight from Last 3 Encounters:   05/31/18 94.1 kg (207 lb 8 oz)   05/10/18 95.5 kg (210 lb 9.6 oz)   05/02/18 95.3 kg (210 lb)                 Today's Medication Changes          These changes are accurate as of 5/31/18  9:07 AM.  If you have any questions, ask your nurse or doctor.               Start taking these medicines.        Dose/Directions     prochlorperazine 10 MG tablet   Commonly known as:  COMPAZINE   Used for:  Melanoma of anus (H)        Dose:  10 mg   Take 1 tablet (10 mg) by mouth every 6 hours as needed (Nausea/Vomiting)   Quantity:  30 tablet   Refills:  3         These medicines have changed or have updated prescriptions.        Dose/Directions    * LORazepam 0.5 MG tablet   Commonly known as:  ATIVAN   This may have changed:  You were already taking a medication with the same name, and this prescription was added. Make sure you understand how and when to take each.   Used for:  Melanoma of anus (H)        Dose:  0.5 mg   Take 1 tablet (0.5 mg) by mouth every 4 hours as needed (Anxiety, Nausea/Vomiting or Sleep)   Quantity:  30 tablet   Refills:  3       * LORazepam 0.5 MG tablet   Commonly known as:  ATIVAN   This may have changed:  Another medication with the same name was added. Make sure you understand how and when to take each.   Used for:  Anxiety        Dose:  0.5 mg   Take 1 tablet (0.5 mg) by mouth as needed for anxiety (1 mg Ativan 1 hour prior. Take additional 0.5 mg 30 minutes prior if still feeling anxious.)   Quantity:  20 tablet   Refills:  0       * Notice:  This list has 2 medication(s) that are the same as other medications prescribed for you. Read the directions carefully, and ask your doctor or other care provider to review them with you.         Where to get your medicines      These medications were sent to [place], [place] - [address] [place] [address]    Hours:  [facility] PHONE NUMBER [phone] Phone:  [phone]     prochlorperazine 10 MG tablet         Some of these will need a paper prescription and others can be bought over the counter.  Ask your nurse if you have questions.     Bring a paper prescription for each of these medications     LORazepam 0.5 MG tablet    LORazepam 0.5 MG tablet                Primary Care Provider  Office Phone # Fax #    Aide Robertson [phone]       [facility] [address] [address]        Equal Access to Services     SHELIA WALKER : Dajuan Cox, wamarcida luqadaha, qaybta kaalmada marco, silvia everardoin haykeke camiriamtaylor guzmán. So [facility] [phone].    ATENCIÓN: Si habla español, tiene a tim disposición servicios gratuitos de asistencia lingüística. Llame al [phone].    We comply with applicable federal civil rights laws and [place] laws. We do not discriminate on the basis of race, color, national origin, age, disability, sex, sexual orientation, or gender identity.            Thank you!     Thank you for choosing [facility]  for your care. Our goal is always to provide you with excellent care. Hearing back from our patients is one way we can continue to improve our services. Please take a few minutes to complete the written survey that you may receive in the mail after your visit with us. Thank you!             Your Updated Medication List - Protect others around you: Learn how to safely use, store and throw away your medicines at www.disposemymeds.org.          This list is accurate as of 5/31/18  9:07 AM.  Always use your most recent med list.                   Brand Name Dispense Instructions for use Diagnosis    ALEVE PO      Take 220 mg by mouth every 12 hours        calcium carbonate 500 MG tablet    OS-JANAY 500 mg Andreafski. Ca     Take 1 tablet by mouth every evening        Calcium Oyster Shell 1250 (500 Ca) MG Tabs           * LORazepam 0.5 MG tablet    ATIVAN    30 tablet    Take 1 tablet (0.5 mg) by mouth every 4 hours as needed (Anxiety, Nausea/Vomiting or Sleep)    Melanoma of anus (H)       * LORazepam 0.5 MG tablet    ATIVAN    20 tablet    Take 1 tablet (0.5 mg) by mouth as needed for anxiety (1 mg Ativan 1 hour prior. Take additional 0.5 mg 30 minutes prior if still feeling anxious.)    Anxiety        MAGNESIUM PO      Take by mouth At Bedtime        METAMUCIL PO      Take 1 packet by mouth        oxyCODONE IR 5 MG tablet    ROXICODONE    6 tablet    Take 1 tablet (5 mg) by mouth every 6 hours as needed for pain or other (Moderate to Severe)    Malignant melanoma of anal skin (H)       prochlorperazine 10 MG tablet    COMPAZINE    30 tablet    Take 1 tablet (10 mg) by mouth every 6 hours as needed (Nausea/Vomiting)    Melanoma of anus (H)       TYLENOL ARTHRITIS PAIN 650 MG CR tablet   Generic drug:  acetaminophen      Take 650 mg by mouth every 6 hours as needed for mild pain or fever        VITAMIN D (CHOLECALCIFEROL) PO      Take by mouth every evening        * Notice:  This list has 2 medication(s) that are the same as other medications prescribed for you. Read the directions carefully, and ask your doctor or other care provider to review them with you.

## 2018-05-31 NOTE — PATIENT INSTRUCTIONS
Contact Numbers    [facility] Main Line: [phone]  [facility] Triage and after hours / weekends / holidays:  [phone]      Please call the triage or after hours line if you experience a temperature greater than or equal to 100.5, shaking chills, have uncontrolled nausea, vomiting and/or diarrhea, dizziness, shortness of breath, chest pain, bleeding, unexplained bruising, or if you have any other new/concerning symptoms, questions or concerns.      If you are having any concerning symptoms or wish to speak to a provider before your next infusion visit, please call your care coordinator or triage to notify them so we can adequately serve you.     If you need a refill on a narcotic prescription or other medication, please call before your infusion appointment.           May 2018   [place] Monday Tuesday Wednesday Thursday Friday Saturday             1     2     [place] LYMPHOSCIN INJ & SCAN    8:00 AM   (120 min.)   [id number]   [facility] [place], Nuclear Medicine     Outpatient Visit   10:07 AM   [facility]     BIOPSY NODE SENTINEL   12:25 PM   Adams Shah MD    OR 3     4     5       6     7     8     9     10     [facility] DRAW    7:45 AM   (15 min.)    [facility] DRAW   [facility] Lab Draw     [facility] RETURN    8:15 AM   (30 min.)   Nigel Gauthier MD   [facility] POST-OP    9:00 AM   (15 min.)   Adams Shah MD   [facility] 11     12       13     14     15     16     17     18     19       20     21     22     23     24     25     26       27     28     29     30     31     [facility] DRAW    7:30 AM   (15 min.)    [facility] DRAW   [facility] Lab Draw     [facility] RETURN    7:45 AM   (30 min.)   Nigel Gauthier MD   [facility] ONC INFUSION 60   11:00 AM   (60 min.)    ONCOLOGY INFUSION   [facility]                      June 2018 Sunday Monday Tuesday Wednesday Thursday  Friday Saturday                            1     2       3     4     5     6     7     8     9       10     11     12     13     14     [facility] DRAW   10:15 AM   (15 min.)    [facility] DRAW   [facility] Lab Draw     [facility] RETURN   10:45 AM   (30 min.)   Nigel Gauthier MD   [facility] ONC INFUSION 60   11:30 AM   (60 min.)    ONCOLOGY INFUSION   [facility] 15     16       17     18     19     20     21     22     23       24     25     26     27     28     29     30                  Lab Results:  Recent Results (from the past 12 hour(s))   Comprehensive metabolic panel    Collection Time: 05/31/18  7:38 AM   Result Value Ref Range    Sodium 142 133 - 144 mmol/L    Potassium 3.8 3.4 - 5.3 mmol/L    Chloride 109 94 - 109 mmol/L    Carbon Dioxide 25 20 - 32 mmol/L    Anion Gap 8 3 - 14 mmol/L    Glucose 95 70 - 99 mg/dL    Urea Nitrogen 19 7 - 30 mg/dL    Creatinine 0.88 0.52 - 1.04 mg/dL    GFR Estimate 66 >60 mL/min/1.7m2    GFR Estimate If Black 80 >60 mL/min/1.7m2    Calcium 8.8 8.5 - 10.1 mg/dL    Bilirubin Total 0.4 0.2 - 1.3 mg/dL    Albumin 3.6 3.4 - 5.0 g/dL    Protein Total 7.3 6.8 - 8.8 g/dL    Alkaline Phosphatase 107 40 - 150 U/L    ALT 27 0 - 50 U/L    AST 15 0 - 45 U/L   TSH with free T4 reflex    Collection Time: 05/31/18  7:38 AM   Result Value Ref Range    TSH 2.39 0.40 - 4.00 mU/L

## 2018-05-31 NOTE — NURSING NOTE
Chief Complaint   Patient presents with     Blood Draw     Right arm butterfly  X 1, labs drawn and sent, vitals completed, checked into next appointment.   Cailin Lopes RN

## 2018-06-12 NOTE — TELEPHONE ENCOUNTER
PD-L1 Analysis (Dako 22C3 pharmaDx  for KEYTRUDA )  Tumor Result: Negative  Tumor Proportion Score (%): 0    Full Foundation one report in Media section

## 2018-06-14 NOTE — PATIENT INSTRUCTIONS
Contact Numbers  [facility]: [phone]    After Hours:  [phone]  Triage: [phone]    Please call the [facility] Triage line if you experience a temperature greater than or equal to 100.5, shaking chills, have uncontrolled nausea, vomiting and/or diarrhea, dizziness, shortness of breath, chest pain, bleeding, unexplained bruising, or if you have any other new/concerning symptoms, questions or concerns.     If it is after hours, weekends, or holidays, please call the main hospital  at  [phone] and ask to speak to the Oncology doctor on call.     If you are having any concerning symptoms or wish to speak to a provider before your next infusion visit, please call your care coordinator or triage to notify them so we can adequately serve you.     If you need a refill on a narcotic prescription or other medication, please call triage before your infusion appointment.           June 2018 Sunday Monday Tuesday Wednesday Thursday Friday Saturday                            1     2       3     4     5     6     7     8     9       10     11     12     13     14     PET ONCOLOGY WHOLE BODY    7:45 AM   (45 min.)   [id number]   [facility] [place] PET CT     [facility] DRAW   10:15 AM   (15 min.)    [facility] LAB DRAW   [facility] Lab Draw     [facility] RETURN   10:45 AM   (30 min.)   Nigel Gauthier MD   [facility] ONC INFUSION 60   11:30 AM   (60 min.)   UC ONCOLOGY INFUSION   [facility] 15     16       17     18     19     20     21     22     23       24     25     26     27     28     [facility] DRAW   11:00 AM   (15 min.)    [facility] DRAW   [facility] Lab Draw     [facility] ONC INFUSION 60   11:30 AM   (60 min.)   UC ONCOLOGY INFUSION   [facility] 29 30 July 2018 Sunday Monday Tuesday Wednesday Thursday Friday Saturday   1     2     3     4     5     6     7       8     9     10      11     12     [facility] DRAW   11:00 AM   (15 min.)    [facility] LAB DRAW   [facility] Lab Draw     [facility] ONC INFUSION 60   11:30 AM   (60 min.)    ONCOLOGY INFUSION   [facility] 13     14       15     16     17     18     19     20     21       22     23     24     25     26     [facility] LAB DRAW   11:00 AM   (15 min.)    [facility] LAB DRAW   [facility] Lab Draw     [facility] ONC INFUSION 60   11:30 AM   (60 min.)    ONCOLOGY INFUSION   [facility] 27     28       29     30     31                                      Lab Results:  Recent Results (from the past 12 hour(s))   CBC with platelets differential    Collection Time: 06/14/18 10:50 AM   Result Value Ref Range    WBC Canceled, Test credited 4.0 - 11.0 10e9/L    RBC Count Canceled, Test credited 3.8 - 5.2 10e12/L    Hemoglobin Canceled, Test credited 11.7 - 15.7 g/dL    Hematocrit Canceled, Test credited 35.0 - 47.0 %    MCV Canceled, Test credited 78 - 100 fl    MCH Canceled, Test credited 26.5 - 33.0 pg    MCHC Canceled, Test credited 31.5 - 36.5 g/dL    RDW Canceled, Test credited 10.0 - 15.0 %    Platelet Count Canceled, Test credited 150 - 450 10e9/L    Diff Method Canceled, Test credited    Comprehensive metabolic panel    Collection Time: 06/14/18 10:50 AM   Result Value Ref Range    Sodium 138 133 - 144 mmol/L    Potassium 5.8 (H) 3.4 - 5.3 mmol/L    Chloride 109 94 - 109 mmol/L    Carbon Dioxide 21 20 - 32 mmol/L    Anion Gap 8 3 - 14 mmol/L    Glucose 121 (H) 70 - 99 mg/dL    Urea Nitrogen 18 7 - 30 mg/dL    Creatinine 0.68 0.52 - 1.04 mg/dL    GFR Estimate 88 >60 mL/min/1.7m2    GFR Estimate If Black >90 >60 mL/min/1.7m2    Calcium 8.3 (L) 8.5 - 10.1 mg/dL    Bilirubin Total 0.5 0.2 - 1.3 mg/dL    Albumin 3.5 3.4 - 5.0 g/dL    Protein Total 7.4 6.8 - 8.8 g/dL    Alkaline Phosphatase 116 40 - 150 U/L    ALT 27 0 - 50 U/L    AST Canceled, Test credited 0 - 45 U/L   Comprehensive metabolic panel     Collection Time: 06/14/18 12:20 PM   Result Value Ref Range    Sodium 139 133 - 144 mmol/L    Potassium 4.0 3.4 - 5.3 mmol/L    Chloride 107 94 - 109 mmol/L    Carbon Dioxide 22 20 - 32 mmol/L    Anion Gap 10 3 - 14 mmol/L    Glucose 102 (H) 70 - 99 mg/dL    Urea Nitrogen 18 7 - 30 mg/dL    Creatinine 0.72 0.52 - 1.04 mg/dL    GFR Estimate 82 >60 mL/min/1.7m2    GFR Estimate If Black >90 >60 mL/min/1.7m2    Calcium 8.7 8.5 - 10.1 mg/dL    Bilirubin Total 0.3 0.2 - 1.3 mg/dL    Albumin 3.6 3.4 - 5.0 g/dL    Protein Total 7.7 6.8 - 8.8 g/dL    Alkaline Phosphatase 124 40 - 150 U/L    ALT 24 0 - 50 U/L    AST 18 0 - 45 U/L   CBC with platelets differential    Collection Time: 06/14/18 12:45 PM   Result Value Ref Range    WBC 5.9 4.0 - 11.0 10e9/L    RBC Count 5.00 3.8 - 5.2 10e12/L    Hemoglobin 14.4 11.7 - 15.7 g/dL    Hematocrit 44.4 35.0 - 47.0 %    MCV 89 78 - 100 fl    MCH 28.8 26.5 - 33.0 pg    MCHC 32.4 31.5 - 36.5 g/dL    RDW 13.7 10.0 - 15.0 %    Platelet Count 272 150 - 450 10e9/L    Diff Method Automated Method     % Neutrophils 62.0 %    % Lymphocytes 25.5 %    % Monocytes 6.1 %    % Eosinophils 5.4 %    % Basophils 0.8 %    % Immature Granulocytes 0.2 %    Nucleated RBCs 0 0 /100    Absolute Neutrophil 3.7 1.6 - 8.3 10e9/L    Absolute Lymphocytes 1.5 0.8 - 5.3 10e9/L    Absolute Monocytes 0.4 0.0 - 1.3 10e9/L    Absolute Eosinophils 0.3 0.0 - 0.7 10e9/L    Absolute Basophils 0.1 0.0 - 0.2 10e9/L    Abs Immature Granulocytes 0.0 0 - 0.4 10e9/L    Absolute Nucleated RBC 0.0

## 2018-06-14 NOTE — MR AVS SNAPSHOT
After Visit Summary   6/14/2018    Khloe Tamez    MRN: [id number]           Patient Information     Date Of Birth          1955        Visit Information        Provider Department      6/14/2018 11:00 AM Nigel Gauthier MD [facility]        Today's Diagnoses     Melanoma of anus (H)    -  1    Metastatic malignant melanoma (H)           Follow-ups after your visit        Follow-up notes from your care team     Return in about 2 months (around 8/14/2018).      Your next 10 appointments already scheduled     Jul 12, 2018 10:00 AM CDT   [facility] Draw with [facility] MAS[facility] LAB DRAW   [place] [facility] ([facility])    [address]  [address]  [place] [facility] [address]   [phone]            Jul 12, 2018 11:30 AM CDT   Infusion 62 with UC ONCOLOGY INFUSION, UC 22 ATC   [facility] ([facility])    [address]  [address]  [place] [facility] [address]   [phone]            Jul 26, 2018  8:00 AM CDT   [facility] Lab Draw with [facility] MASONIC LAB DRAW   [place] [facility] ([facility])    [address]  [address]  [place] [facility] [address]   [phone]            Jul 26, 2018  8:30 AM CDT   Infusion 62 with UC ONCOLOGY INFUSION, UC 16 ATC   [facility] ([facility])    [address]  [address]  [place] [address]   [phone]            Aug 09, 2018  8:15 AM CDT   [facility] Lab Draw with [facility] MASONIC LAB DRAW   [place] [facility] ([facility])    [address]  [address]  [place] [facility] [address]   [phone]            Aug 09, 2018  9:00 AM CDT   CT CHEST ABDOMEN PELVIS W/O & W CONTRAST with UCCT2   [facility] ([facility])    [address]  1st Floor  [place] [facility] [address]   [phone]           Please  bring any scans or X-rays taken at other hospitals, if similar tests were done. Also bring a list of your medicines, including vitamins, minerals and over-the-counter drugs. It is safest to leave personal items at home.  Be sure to tell your doctor:   If you have any allergies.   If there s any chance you are pregnant.   If you are breastfeeding.  How to prepare:   Do not eat or drink for 2 hours before your exam. If you need to take medicine, you may take it with small sips of water. (We may ask you to take liquid medicine as well.)   Please wear loose clothing, such as a sweat suit or jogging clothes. Avoid snaps, zippers and other metal. We may ask you to undress and put on a hospital gown.  Please arrive 30 minutes early for your CT. Once in the department you might be asked to drink water 15-20 minutes prior to your exam.  If indicated you may be asked to drink an oral contrast in advance of your CT.  If this is the case, the imaging team will let you know or be in contact with you prior to your appointment  Patients over 70 or patients with diabetes or kidney problems:   If you haven t had a blood test (creatinine test) within the last 30 days, the Cardiologist/Radiologist may require you to get this test prior to your exam.  If you have diabetes:   Continue to take your metformin medication on the day of your exam  If you have any questions, please call the Imaging Department where you will have your exam.            Aug 09, 2018 11:00 AM CDT   (Arrive by 10:45 AM)   Return Visit with Nigel Valentine MD   [facility])    [address] [place] [address]  [address]  [facility] [address] [phone]            Aug 09, 2018 11:30 AM CDT   Infusion 62 with UC ONCOLOGY INFUSION   formerly [facility])    [address] [place] [address]  [address]  [facility] [address] [phone]              Who to contact      "If you have questions or need follow up information about today's clinic visit or your schedule please contact [facility] directly at [phone].  Normal or non-critical lab and imaging results will be communicated to you by MyChart, letter or phone within 4 business days after the clinic has received the results. If you do not hear from us within 7 days, please contact the clinic through [employer] or phone. If you have a critical or abnormal lab result, we will notify you by phone as soon as possible.  Submit refill requests through [employer] or call your pharmacy and they will forward the refill request to us. Please allow 3 business days for your refill to be completed.          Additional Information About Your Visit        [employer] Information     [employer] gives you secure access to your electronic health record. If you see a primary care provider, you can also send messages to your care team and make appointments. If you have questions, please call your primary care clinic.  If you do not have a primary care provider, please call [phone] and they will assist you.        Care EveryWhere ID     This is your Care EveryWhere ID. This could be used by other organizations to access your [facility] medical records  [id number]        Your Vitals Were     Pulse Temperature Respirations Height Pulse Oximetry BMI (Body Mass Index)    86 99.1  F (37.3  C) (Oral) 20 1.676 m (5' 6\") 94% 33.18 kg/m2       Blood Pressure from Last 3 Encounters:   06/28/18 127/72   06/28/18 127/72   06/14/18 135/85    Weight from Last 3 Encounters:   06/28/18 91.6 kg (202 lb)   06/28/18 91.6 kg (201 lb 15.1 oz)   06/14/18 93.3 kg (205 lb 9.6 oz)              We Performed the Following     CBC with platelets differential     Comprehensive metabolic panel        Primary Care Provider Office Phone # Fax #    Aide Robertson [phone]       [address] [address]      "   Equal Access to Services     [facility] : Hadii aad ku hadspensercody Nayeliraisa, wamarcida luqbryceha, qadwayneta teprabhusilvia yarbrough. So [facility] [phone].    ATENCIÓN: Si habla clem, tiene a tim disposición servicios gratuitos de asistencia lingüística. Samantha al [phone].    We comply with applicable federal civil rights laws and [place] laws. We do not discriminate on the basis of race, color, national origin, age, disability, sex, sexual orientation, or gender identity.            Thank you!     Thank you for choosing [facility]  for your care. Our goal is always to provide you with excellent care. Hearing back from our patients is one way we can continue to improve our services. Please take a few minutes to complete the written survey that you may receive in the mail after your visit with us. Thank you!             Your Updated Medication List - Protect others around you: Learn how to safely use, store and throw away your medicines at www.disposemymeds.org.          This list is accurate as of 6/14/18 11:59 PM.  Always use your most recent med list.                   Brand Name Dispense Instructions for use Diagnosis    calcium carbonate 500 MG tablet    OS-JANAY 500 mg [place]. Ca     Take 1 tablet by mouth every evening        * LORazepam 0.5 MG tablet    ATIVAN    30 tablet    Take 1 tablet (0.5 mg) by mouth every 4 hours as needed (Anxiety, Nausea/Vomiting or Sleep)    Melanoma of anus (H)       * LORazepam 0.5 MG tablet    ATIVAN    20 tablet    Take 1 tablet (0.5 mg) by mouth as needed for anxiety (1 mg Ativan 1 hour prior. Take additional 0.5 mg 30 minutes prior if still feeling anxious.)    Anxiety       MAGNESIUM PO      Take by mouth At Bedtime        METAMUCIL PO      Take 1 packet by mouth        prochlorperazine 10 MG tablet    COMPAZINE    30 tablet    Take 1 tablet (10 mg) by mouth every 6 hours as needed (Nausea/Vomiting)    Melanoma of anus  (H)       TYLENOL ARTHRITIS PAIN 650 MG CR tablet   Generic drug:  acetaminophen      Take 650 mg by mouth every 6 hours as needed for mild pain or fever        VITAMIN D (CHOLECALCIFEROL) PO      Take by mouth every evening        * Notice:  This list has 2 medication(s) that are the same as other medications prescribed for you. Read the directions carefully, and ask your doctor or other care provider to review them with you.

## 2018-06-14 NOTE — NURSING NOTE
"Oncology Rooming Note    June 14, 2018 11:05 AM   Khloe Tamez is a 62 year old female who presents for:    Chief Complaint   Patient presents with     Blood Draw     Labs drawn via prev placed PIV by RN, line flushed and saline locked, VS taken     Oncology Clinic Visit     Return: Melanoma      Initial Vitals: /85 (BP Location: Right arm, Patient Position: Sitting, Cuff Size: Adult Regular)  Pulse 86  Temp 99.1  F (37.3  C) (Oral)  Resp 20  Ht 1.676 m (5' 6\")  Wt 93.3 kg (205 lb 9.6 oz)  SpO2 94%  BMI 33.18 kg/m2 Estimated body mass index is 33.18 kg/(m^2) as calculated from the following:    Height as of this encounter: 1.676 m (5' 6\").    Weight as of this encounter: 93.3 kg (205 lb 9.6 oz). Body surface area is 2.08 meters squared.  No Pain (0) Comment: Data Unavailable   No LMP recorded. Patient is postmenopausal.  Allergies reviewed: Yes  Medications reviewed: Yes    Medications: Medication refills not needed today.  Pharmacy name entered into [facility]: [place], [place]    Clinical concerns: new concerns are that she has a cough now and not sure if it a bug, or treatment related and also in the past the combination of mucinex and the albuterol inhaler has kept it at bay. Dr. Valentine was notified.    10 minutes for nursing intake (face to face time)     Aubrey Eduardo CMA              "

## 2018-06-14 NOTE — LETTER
6/14/2018       RE: Khloe Tamez  [address] [address] [facility] [address] [address]     Dear Colleague,    Thank you for referring your patient, Khloe Tamez, to the [facility]. Please see a copy of my visit note below.    [facility]  MEDICAL ONCOLOGY PROGRESS NOTE  Jun 14, 2018    CHIEF COMPLAINT: Perianal melanoma    Melanoma History:.  1. 3/27/18 patient had perianal lesion biopsied in [place] ([id number]). This showed malignant melanoma, positive for HMB45 and S100, and negative for CD45 and pancytokeratin. Breslow depth at least 3.5 mm, non-ulcerated, with 5 mitoses/mm2, TILs present with deep and peripheral margins positive; pT3a.  2. 3/29/18, PET-CT showed a perianal FDG avid lesion (SUV max 10.8) and a superficial left inguinal lymph node measuring 0.9 cm (SUV max 4.1).  3. 4/13/18, she has wide local excision of a 2 x 2.5 cm perianal melanoma with anaplastic closure, under Dr. Wright. Pathology showed a nodular melanoma, Breslow depth 6.0 mm to mark's level IV, there were more than 10 mitoses per mm2, with ulceration, and lymphovascular invasion present. No perineural invasion or microsatellitosis. Tumor infiltrating lymphocytes present, non-brisk. AJCC microstage: pT4b.  4. 4/26/18, she has MRI brain, which is negative for metastatic disease.  5. 5/2/18, she returns to OR under Dr. Shah for left femoral sentinel lymph node biopsy, with 2 (of 3) involved lymph nodes. IHC positive for Melan-A, tyrosinase, and HMB-45. No definite extranodal extension.  6. 6/11/18, [employer] testing shows PD-L1 0% by IHC, with microsatellite stability (MARY LOU) and tumor mutational burden low at 4 per Mb DNA. Mutations seen in STK11 V4fs*160, BRAF D594G, SF3B1 R625H and KIT amplification.      HISTORY OF PRESENT ILLNESS  Khloe Tamez is a 62 year old female from [place], [place] with resected perianal melanoma. She presents today in follow-up. She tolerated her first dose of adjuvant  "nivolumab with no significant toxicity. She is due today for cycle 1 day 15 of her therapy.    She denies cough, shortness of breath, nausea, vomiting, or abdominal pain. She has had no fevers or chills. No new palpable lumps or bumps. No new neurologic complaints. KPS 90.    REVIEW OF SYSTEMS  A 12-point ROS negative except as in HPI.    Past Medical History:   Diagnosis Date     Melanoma malignant, perianal skin (H)      Obesity (BMI 30.0-34.9)      Osteoarthritis      Past Surgical History:   Procedure Laterality Date     BIOPSY NODE SENTINEL Left 2018    Procedure: BIOPSY NODE SENTINEL;  [place] Lymph Node Biopsy x3, Lymphatic mapping;  Surgeon: Adams Shah MD;  [facility]      SECTION       COLONOSCOPY       EXAM UNDER ANESTHESIA ANUS N/A 2018    Procedure: EXAM UNDER ANESTHESIA ANUS;  Examination Under Anesthesia Anus, Local Excision of Perianal Melanoma;  Surgeon: Ronald Wright MD;  [facility]     EXCISE LESION RECTUM N/A 2018    Procedure: EXCISE LESION RECTUM;;  Surgeon: Ronald Wright MD;  [facility]       SOCIAL HISTORY  She has worked as a caregiver, currently works at assisted living facility. Lifelong nonsmoker. No significant alcohol. She has twins.      FAMILY HISTORY  Father had a skin cancer, unsure if melanoma. Paternal aunt had stomach cancer.    PHYSICAL EXAMINATION  /85 (BP Location: Right arm, Patient Position: Sitting, Cuff Size: Adult Regular)  Pulse 86  Temp 99.1  F (37.3  C) (Oral)  Resp 20  Ht 1.676 m (5' 6\")  Wt 93.3 kg (205 lb 9.6 oz)  SpO2 94%  BMI 33.18 kg/m2    Physical Exam   Constitutional: She is oriented to person, place, and time. She appears well-developed and well-nourished.   HENT:   Mouth/Throat: Oropharynx is clear and moist.   Eyes: EOM are normal. Pupils are equal, round, and reactive to light. No scleral icterus.   Neck: Normal range of motion. Neck supple.   Cardiovascular: Regular rhythm.    Pulmonary/Chest: " Effort normal and breath sounds normal. She has no wheezes.   Abdominal: Soft. She exhibits no mass.   Musculoskeletal: Normal range of motion. She exhibits no tenderness.   Neurological: She is alert and oriented to person, place, and time. No cranial nerve deficit.   Skin: Skin is warm and dry. No rash noted. No pallor.   Psychiatric: She has a normal mood and affect.   Nursing note and vitals reviewed.      ASSESSMENT AND PLAN  #1 Resected Stage IIIC nodular melanoma, pT4b pN2b, perianal primary  It was a pleasure to see Ms. Tamez today. She is a 62 year old woman with resected perianal melanoma. She has tolerated her first dose of adjuvant nivolumab well with no significant toxicity. We will proceed with day 15 of therapy.    We did again briefly review the expected toxicities include hypothyroidism in about 15% of patients. There is also a possibility diarrhea or pneumonitis, though typically in less than 10% of patients.     [employer] molecular studies showed PD-L1 expression of 0%. There are two potentially targettable mutations, specifically c-Kit amplification and a BRAF mutation.     We will obtain a CT-CAP with labs in 2 months with return to see me in clinic.    Multiple questions answered.    Nigel Person M.D.   of Medicine  Hematology, Oncology and Transplantation

## 2018-06-14 NOTE — NURSING NOTE
Chief Complaint   Patient presents with     Blood Draw     Labs drawn via prev placed PIV by RN, line flushed and saline locked, VS taken     Additional green top drawn and sent to lab, pt concerns regarding hot flashes sent via mychart to provider, asked for thyroid check. Provider paged by writer, no answer by time pt left lab. Checked in for provider visit.    Leslie Carlisle RN

## 2018-06-14 NOTE — PROGRESS NOTES
[facility]  MEDICAL ONCOLOGY PROGRESS NOTE  Jun 14, 2018    CHIEF COMPLAINT: Perianal melanoma    Melanoma History:.  1. 3/27/18 patient had perianal lesion biopsied in [place] ([id number]). This showed malignant melanoma, positive for HMB45 and S100, and negative for CD45 and pancytokeratin. Breslow depth at least 3.5 mm, non-ulcerated, with 5 mitoses/mm2, TILs present with deep and peripheral margins positive; pT3a.  2. 3/29/18, PET-CT showed a perianal FDG avid lesion (SUV max 10.8) and a superficial left inguinal lymph node measuring 0.9 cm (SUV max 4.1).  3. 4/13/18, she has wide local excision of a 2 x 2.5 cm perianal melanoma with anaplastic closure, under Dr. Wright. Pathology showed a nodular melanoma, Breslow depth 6.0 mm to mark's level IV, there were more than 10 mitoses per mm2, with ulceration, and lymphovascular invasion present. No perineural invasion or microsatellitosis. Tumor infiltrating lymphocytes present, non-brisk. AJCC microstage: pT4b.  4. 4/26/18, she has MRI brain, which is negative for metastatic disease.  5. 5/2/18, she returns to OR under Dr. Shah for left femoral sentinel lymph node biopsy, with 2 (of 3) involved lymph nodes. IHC positive for Melan-A, tyrosinase, and HMB-45. No definite extranodal extension.  6. 6/11/18, [employer] testing shows PD-L1 0% by IHC, with microsatellite stability (MARY LOU) and tumor mutational burden low at 4 per Mb DNA. Mutations seen in STK11 V4fs*160, BRAF D594G, SF3B1 R625H and KIT amplification.      HISTORY OF PRESENT ILLNESS  Khloe Tamez is a 62 year old female from [place], [place] with resected perianal melanoma. She presents today in follow-up. She tolerated her first dose of adjuvant nivolumab with no significant toxicity. She is due today for cycle 1 day 15 of her therapy.    She denies cough, shortness of breath, nausea, vomiting, or abdominal pain. She has had no fevers or chills. No new palpable lumps or bumps. No new  "neurologic complaints. KPS 90.    REVIEW OF SYSTEMS  A 12-point ROS negative except as in HPI.    Past Medical History:   Diagnosis Date     Melanoma malignant, perianal skin (H)      Obesity (BMI 30.0-34.9)      Osteoarthritis      Past Surgical History:   Procedure Laterality Date     BIOPSY NODE SENTINEL Left 2018    Procedure: BIOPSY NODE SENTINEL;  [place] Lymph Node Biopsy x3, Lymphatic mapping;  Surgeon: Adams Shah MD;  [facility]      SECTION       COLONOSCOPY       EXAM UNDER ANESTHESIA ANUS N/A 2018    Procedure: EXAM UNDER ANESTHESIA ANUS;  Examination Under Anesthesia Anus, Local Excision of Perianal Melanoma;  Surgeon: Roanld Wright MD;  [facility]     EXCISE LESION RECTUM N/A 2018    Procedure: EXCISE LESION RECTUM;;  Surgeon: Ronald Wright MD;  [facility]       SOCIAL HISTORY  She has worked as a caregiver, currently works at assisted living facility. Lifelong nonsmoker. No significant alcohol. She has twins.      FAMILY HISTORY  Father had a skin cancer, unsure if melanoma. Paternal aunt had stomach cancer.    PHYSICAL EXAMINATION  /85 (BP Location: Right arm, Patient Position: Sitting, Cuff Size: Adult Regular)  Pulse 86  Temp 99.1  F (37.3  C) (Oral)  Resp 20  Ht 1.676 m (5' 6\")  Wt 93.3 kg (205 lb 9.6 oz)  SpO2 94%  BMI 33.18 kg/m2    Physical Exam   Constitutional: She is oriented to person, place, and time. She appears well-developed and well-nourished.   HENT:   Mouth/Throat: Oropharynx is clear and moist.   Eyes: EOM are normal. Pupils are equal, round, and reactive to light. No scleral icterus.   Neck: Normal range of motion. Neck supple.   Cardiovascular: Regular rhythm.    Pulmonary/Chest: Effort normal and breath sounds normal. She has no wheezes.   Abdominal: Soft. She exhibits no mass.   Musculoskeletal: Normal range of motion. She exhibits no tenderness.   Neurological: She is alert and oriented to person, place, and time. No " cranial nerve deficit.   Skin: Skin is warm and dry. No rash noted. No pallor.   Psychiatric: She has a normal mood and affect.   Nursing note and vitals reviewed.      ASSESSMENT AND PLAN  #1 Resected Stage IIIC nodular melanoma, pT4b pN2b, perianal primary  It was a pleasure to see Ms. Tamez today. She is a 62 year old woman with resected perianal melanoma. She has tolerated her first dose of adjuvant nivolumab well with no significant toxicity. We will proceed with day 15 of therapy.    We did again briefly review the expected toxicities include hypothyroidism in about 15% of patients. There is also a possibility diarrhea or pneumonitis, though typically in less than 10% of patients.     [employer] molecular studies showed PD-L1 expression of 0%. There are two potentially targettable mutations, specifically c-Kit amplification and a BRAF mutation.     We will obtain a CT-CAP with labs in 2 months with return to see me in clinic.    Multiple questions answered.    Nigel Person M.D.   of Medicine  Hematology, Oncology and Transplantation

## 2018-06-14 NOTE — PROGRESS NOTES
Infusion Nursing Note:  Khloe Tamez presents today for C1D15 Opdivo.    Patient seen by provider today: Yes: Murphy Valentine MD   present during visit today: Not Applicable.    Note: Patient feels well. No complaints made. Otherwise well.    Intravenous Access:  Peripheral IV placed.    Treatment Conditions:  Lab Results   Component Value Date    HGB 14.4 06/14/2018     Lab Results   Component Value Date    WBC 5.9 06/14/2018      Lab Results   Component Value Date    ANEU 3.7 06/14/2018     Lab Results   Component Value Date     06/14/2018      Lab Results   Component Value Date     06/14/2018                   Lab Results   Component Value Date    POTASSIUM 4.0 06/14/2018           No results found for: MAG         Lab Results   Component Value Date    CR 0.72 06/14/2018                   Lab Results   Component Value Date    JANAY 8.7 06/14/2018                Lab Results   Component Value Date    BILITOTAL 0.3 06/14/2018           Lab Results   Component Value Date    ALBUMIN 3.6 06/14/2018                    Lab Results   Component Value Date    ALT 24 06/14/2018           Lab Results   Component Value Date    AST 18 06/14/2018       Results reviewed, labs MET treatment parameters, ok to proceed with treatment.      Post Infusion Assessment:  Patient tolerated infusion without incident.  Blood return noted pre and post infusion.  Site patent and intact, free from redness, edema or discomfort.  No evidence of extravasations.  Access discontinued per protocol.    Discharge Plan:   Patient declined prescription refills.  Discharge instructions reviewed with: Patient and Family.  Patient and/or family verbalized understanding of discharge instructions and all questions answered.  AVS to patient via [employer].  Patient will return 6/28/18 for next appointment.   Patient discharged in stable condition accompanied by: self and .  Departure Mode: Ambulatory.    KATIANA HARPER  RN

## 2018-06-14 NOTE — MR AVS SNAPSHOT
After Visit Summary   6/14/2018    Khloe Tamez    MRN: [id number]           Patient Information     Date Of Birth          1955        Visit Information        Provider Department      6/14/2018 11:30 AM  12 ATC;  ONCOLOGY INFUSION [facility]        Today's Diagnoses     Metastatic malignant melanoma (H)    -  1    Melanoma of anus (H)          Care Instructions    Contact Numbers  [facility]: [phone]    After Hours:  [phone]  Triage: [phone]    Please call the [facility] Triage line if you experience a temperature greater than or equal to 100.5, shaking chills, have uncontrolled nausea, vomiting and/or diarrhea, dizziness, shortness of breath, chest pain, bleeding, unexplained bruising, or if you have any other new/concerning symptoms, questions or concerns.     If it is after hours, weekends, or holidays, please call the main hospital  at  [phone] and ask to speak to the Oncology doctor on call.     If you are having any concerning symptoms or wish to speak to a provider before your next infusion visit, please call your care coordinator or triage to notify them so we can adequately serve you.     If you need a refill on a narcotic prescription or other medication, please call triage before your infusion appointment.           June 2018 Sunday Monday Tuesday Wednesday Thursday Friday Saturday                            1     2       3     4     5     6     7     8     9       10     11     12     13     14     PET ONCOLOGY WHOLE BODY    7:45 AM   (45 min.)   UUPET1   [facility] [place] [facility] CT     [facility]   10[facility]15 AM   (15 min.)   [facility]   [facility] Lab Draw     [facility] RETURN   10:45 AM   (30 min.)   Nigel Gauthier MD   [facility] ONC INFUSION 60   11:30 AM   (60 min.)    ONCOLOGY INFUSION   [facility] 15     16       17     18     19     20      21     22     23       24     25     26     27     28     [facility]   11:00 AM   (15 min.)    [facility] LAB DRAW   [facility] Lab Draw     [facility] ONC INFUSION 60   11:30 AM   (60 min.)    ONCOLOGY INFUSION   [facility] 29 30 July 2018 Sunday Monday Tuesday Wednesday Thursday Friday Saturday   1     2     3     4     5     6     7       8     9     10     11     12     [facility] DRAW   11:00 AM   (15 min.)    [facility] LAB DRAW   [facility] Lab Draw     [facility] ONC INFUSION 60   11:30 AM   (60 min.)    ONCOLOGY INFUSION   [facility] 13     14       15     16     17     18     19     20     21       22     23     24     25     26     [facility]   11:00 AM   (15 min.)    [facility]RAW   [facility]     [facility] ONC INFUSION 60   11:30 AM   (60 min.)    ONCOLOGY INFUSION   [facility] 27     28       29     30     31                                      Lab Results:  Recent Results (from the past 12 hour(s))   CBC with platelets differential    Collection Time: 06/14/18 10:50 AM   Result Value Ref Range    WBC Canceled, Test credited 4.0 - 11.0 10e9/L    RBC Count Canceled, Test credited 3.8 - 5.2 10e12/L    Hemoglobin Canceled, Test credited 11.7 - 15.7 g/dL    Hematocrit Canceled, Test credited 35.0 - 47.0 %    MCV Canceled, Test credited 78 - 100 fl    MCH Canceled, Test credited 26.5 - 33.0 pg    MCHC Canceled, Test credited 31.5 - 36.5 g/dL    RDW Canceled, Test credited 10.0 - 15.0 %    Platelet Count Canceled, Test credited 150 - 450 10e9/L    Diff Method Canceled, Test credited    Comprehensive metabolic panel    Collection Time: 06/14/18 10:50 AM   Result Value Ref Range    Sodium 138 133 - 144 mmol/L    Potassium 5.8 (H) 3.4 - 5.3 mmol/L    Chloride 109 94 - 109 mmol/L    Carbon Dioxide 21 20 - 32 mmol/L    Anion Gap 8 3 - 14 mmol/L    Glucose 121 (H) 70 - 99 mg/dL    Urea  Nitrogen 18 7 - 30 mg/dL    Creatinine 0.68 0.52 - 1.04 mg/dL    GFR Estimate 88 >60 mL/min/1.7m2    GFR Estimate If Black >90 >60 mL/min/1.7m2    Calcium 8.3 (L) 8.5 - 10.1 mg/dL    Bilirubin Total 0.5 0.2 - 1.3 mg/dL    Albumin 3.5 3.4 - 5.0 g/dL    Protein Total 7.4 6.8 - 8.8 g/dL    Alkaline Phosphatase 116 40 - 150 U/L    ALT 27 0 - 50 U/L    AST Canceled, Test credited 0 - 45 U/L   Comprehensive metabolic panel    Collection Time: 06/14/18 12:20 PM   Result Value Ref Range    Sodium 139 133 - 144 mmol/L    Potassium 4.0 3.4 - 5.3 mmol/L    Chloride 107 94 - 109 mmol/L    Carbon Dioxide 22 20 - 32 mmol/L    Anion Gap 10 3 - 14 mmol/L    Glucose 102 (H) 70 - 99 mg/dL    Urea Nitrogen 18 7 - 30 mg/dL    Creatinine 0.72 0.52 - 1.04 mg/dL    GFR Estimate 82 >60 mL/min/1.7m2    GFR Estimate If Black >90 >60 mL/min/1.7m2    Calcium 8.7 8.5 - 10.1 mg/dL    Bilirubin Total 0.3 0.2 - 1.3 mg/dL    Albumin 3.6 3.4 - 5.0 g/dL    Protein Total 7.7 6.8 - 8.8 g/dL    Alkaline Phosphatase 124 40 - 150 U/L    ALT 24 0 - 50 U/L    AST 18 0 - 45 U/L   CBC with platelets differential    Collection Time: 06/14/18 12:45 PM   Result Value Ref Range    WBC 5.9 4.0 - 11.0 10e9/L    RBC Count 5.00 3.8 - 5.2 10e12/L    Hemoglobin 14.4 11.7 - 15.7 g/dL    Hematocrit 44.4 35.0 - 47.0 %    MCV 89 78 - 100 fl    MCH 28.8 26.5 - 33.0 pg    MCHC 32.4 31.5 - 36.5 g/dL    RDW 13.7 10.0 - 15.0 %    Platelet Count 272 150 - 450 10e9/L    Diff Method Automated Method     % Neutrophils 62.0 %    % Lymphocytes 25.5 %    % Monocytes 6.1 %    % Eosinophils 5.4 %    % Basophils 0.8 %    % Immature Granulocytes 0.2 %    Nucleated RBCs 0 0 /100    Absolute Neutrophil 3.7 1.6 - 8.3 10e9/L    Absolute Lymphocytes 1.5 0.8 - 5.3 10e9/L    Absolute Monocytes 0.4 0.0 - 1.3 10e9/L    Absolute Eosinophils 0.3 0.0 - 0.7 10e9/L    Absolute Basophils 0.1 0.0 - 0.2 10e9/L    Abs Immature Granulocytes 0.0 0 - 0.4 10e9/L    Absolute Nucleated RBC 0.0                 Follow-ups after your visit        Your next 10 appointments already scheduled     Jun 28, 2018 11:00 AM CDT   [facility] Draw with [facility] DRAW   [facility] Draw ([facility])    [address]  [address]  [facility] [address]   [phone]            Jun 28, 2018 11:30 AM CDT   Infusion 60 with UC ONCOLOGY INFUSION, UC 11 ATC   [facility] ([facility])    [address]  [address]  [facility] [address]   [phone]            Jul 12, 2018 11:00 AM CDT   [facility] Draw with [facility] DRAW   [facility] Draw ([facility])    [address]  [address]  [facility] [address]   [phone]            Jul 12, 2018 11:30 AM CDT   Infusion 60 with UC ONCOLOGY INFUSION, UC 15 ATC   [facility] ([facility])    [address]  [address]  [facility] [address]   [phone]            Jul 26, 2018 11:00 AM CDT   [facility] Draw with [facility] DRAW   [facility] Draw ([facility])    [address]  [address]  [facility] [address]   [phone]            Jul 26, 2018 11:30 AM CDT   Infusion 60 with UC ONCOLOGY INFUSION, UC 11 ATC   [facility] ([facility])    [address]  [address]  [facility] [address]   [phone]            Aug 09, 2018  8:15 AM CDT   [facility] Draw with [facility] DRAW   [facility] Draw ([facility])    [address] [place] [address]  [address]  [facility] [address]   [phone]              Who to contact     If you have questions or need follow up information about today's clinic visit or your schedule please contact [facility] directly at [phone].  Normal or non-critical lab and imaging results will be communicated to  you by MyChart, letter or phone within 4 business days after the clinic has received the results. If you do not hear from us within 7 days, please contact the clinic through hakut or phone. If you have a critical or abnormal lab result, we will notify you by phone as soon as possible.  Submit refill requests through [employer] or call your pharmacy and they will forward the refill request to us. Please allow 3 business days for your refill to be completed.          Additional Information About Your Visit        MyScreen[employer] Information     [employer] gives you secure access to your electronic health record. If you see a primary care provider, you can also send messages to your care team and make appointments. If you have questions, please call your primary care clinic.  If you do not have a primary care provider, please call [phone] and they will assist you.        Care EveryWhere ID     This is your Care EveryWhere ID. This could be used by other organizations to access your [facility] medical records  [id number]         Blood Pressure from Last 3 Encounters:   06/14/18 135/85   05/31/18 (!) 161/94   05/10/18 (!) 143/93    Weight from Last 3 Encounters:   06/14/18 93.3 kg (205 lb 9.6 oz)   05/31/18 94.1 kg (207 lb 8 oz)   05/10/18 95.5 kg (210 lb 9.6 oz)              We Performed the Following     CBC with platelets differential     Comprehensive metabolic panel        Primary Care Provider Office Phone # Fax #    Aide Robertson [phone]       [place] [address]        Equal Access to Services     LESA WALKER : Hadii aad sheri hadasho Soomaali, waaxda luqadaha, qaybta kaalmada adenicola, silvia guzmán. So [facility] [phone].    ATENCIÓN: Si habla español, tiene a tim disposición servicios gratuitos de asistencia lingüística. Llame al [phone].    We comply with applicable federal civil rights laws and [place] laws. We do not  discriminate on the basis of race, color, national origin, age, disability, sex, sexual orientation, or gender identity.            Thank you!     Thank you for choosing [facility]  for your care. Our goal is always to provide you with excellent care. Hearing back from our patients is one way we can continue to improve our services. Please take a few minutes to complete the written survey that you may receive in the mail after your visit with us. Thank you!             Your Updated Medication List - Protect others around you: Learn how to safely use, store and throw away your medicines at www.disposemymeds.org.          This list is accurate as of 6/14/18  1:28 PM.  Always use your most recent med list.                   Brand Name Dispense Instructions for use Diagnosis    calcium carbonate 500 MG tablet    OS-JANAY 500 mg [place]. [place]     Take 1 tablet by mouth every evening        * LORazepam 0.5 MG tablet    ATIVAN    30 tablet    Take 1 tablet (0.5 mg) by mouth every 4 hours as needed (Anxiety, Nausea/Vomiting or Sleep)    Melanoma of anus (H)       * LORazepam 0.5 MG tablet    ATIVAN    20 tablet    Take 1 tablet (0.5 mg) by mouth as needed for anxiety (1 mg Ativan 1 hour prior. Take additional 0.5 mg 30 minutes prior if still feeling anxious.)    Anxiety       MAGNESIUM PO      Take by mouth At Bedtime        METAMUCIL PO      Take 1 packet by mouth        prochlorperazine 10 MG tablet    COMPAZINE    30 tablet    Take 1 tablet (10 mg) by mouth every 6 hours as needed (Nausea/Vomiting)    Melanoma of anus (H)       TYLENOL ARTHRITIS PAIN 650 MG CR tablet   Generic drug:  acetaminophen      Take 650 mg by mouth every 6 hours as needed for mild pain or fever        VITAMIN D (CHOLECALCIFEROL) PO      Take by mouth every evening        * Notice:  This list has 2 medication(s) that are the same as other medications prescribed for you. Read the directions carefully, and ask your doctor or other care  provider to review them with you.

## 2018-06-20 NOTE — PROGRESS NOTES
Form Request Documentation    Date Received in Clinic:    Name/Type of Form: [facility]  Questions that need to be addressed:   Current Employment Status: currently working on a part time basis, works 28 hrs per week   Amount of Leave Requested: Intermittent Leave for appointments   Other: None  Date Completed: 6/20/2018  Copy Mailed to patient: Yes on 6/20/2018  Disposition of Form: Fax to  [facility] at [phone]

## 2018-06-25 NOTE — TELEPHONE ENCOUNTER
Patient taking any blood thinners ? No    Heart disease ? denies    Lung disease ? denies      Sleep apnea ? denies    Diabetic ? denies    Kidney disease ? denies    Dialysis ? n/a    Electronic implanted medical devices ? denies    Are you taking any narcotic pain medication ?   no What is your daily dosage ?    PTSD ? n/a    Prep instructions reviewed with patient ? Patient declined review.  policy, MAC sedation plan reviewed. Advised patient to have someone stay with her post exam    Pharmacy : n/a    Indication for procedure :   Melanoma of anus (H) [C21.0]  - Primary          Special screening for malignant neoplasms, colon             Referring provider : Self     Arrival Time : Patient will arrive at 12:30 PM

## 2018-06-25 NOTE — PROGRESS NOTES
Replied to patient's Greene[employer] message regarding persistent cough. Contacted Misha for add on to 6/28 prior to next Opdivo infusion. Per Misha: CBC, CMP, RVP (respiratory viral panel), and CXR    Called Khloe who will be available for check in for CXR at 8:45am on 6/28, followed immediately by labs and appt with Misha at 10:00am, with Opdivo at 11:30am.     Per Khloe:     persistent cough x 9 days and recent exposure to viral URI from her coworkers. She has not seen anyone in the clinic since her cough started. 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 She also said it's usual for her when she gets a URI to have symptoms persist. Took temp this morning, 99.0. Negative for chills, night sweats. Says her cough is productive but has not been checking the color of the sputum. She feels a little SOB after coughing bouts. She has some wheezing, more so at HS, until she is able to clear the sputum with coughing and then the wheezing disappears.     Orders placed and message sent to scheduling.

## 2018-06-27 NOTE — PROGRESS NOTES
Oncology/Hematology Visit Note  Jun 28, 2018    Reason for Visit: Follow up of perianal melanoma    History of Present Illness: hKloe Tamez is a 62 year old female with perianal melanoma. Her oncologic history is as follows:    1. 3/27/18 patient had perianal lesion biopsied in [place] ([id number]). This showed malignant melanoma, positive for HMB45 and S100, and negative for CD45 and pancytokeratin. Breslow depth at least 3.5 mm, non-ulcerated, with 5 mitoses/mm2, TILs present with deep and peripheral margins positive; pT3a.  2. 3/29/18, PET-CT showed a perianal FDG avid lesion (SUV max 10.8) and a superficial left inguinal lymph node measuring 0.9 cm (SUV max 4.1).  3. 4/13/18, she has wide local excision of a 2 x 2.5 cm perianal melanoma with anaplastic closure, under Dr. Wright. Pathology showed a nodular melanoma, Breslow depth 6.0 mm to mark's level IV, there were more than 10 mitoses per mm2, with ulceration, and lymphovascular invasion present. No perineural invasion or microsatellitosis. Tumor infiltrating lymphocytes present, non-brisk. AJCC microstage: pT4b.  4. 4/26/18, she has MRI brain, which is negative for metastatic disease.  5. 5/2/18, she returns to OR under Dr. Shah for left femoral sentinel lymph node biopsy, with 2 (of 3) involved lymph nodes. IHC positive for Melan-A, tyrosinase, and HMB-45. No definite extranodal extension. KPS 90. PD-1 0%  6. 5/31/18, started on adjuvant Nivolumab  7. 6/14/18 PETCT (for baseline purposes after surgery) with possible pulmonary and right gastrocnemius muscle metastases    She reported to triage an ongoing cough following her last infusion and as such I am seeing her to assess prior to cycle 2 day 1 Opdivo.     Interval History:  Ms. Tamez returns to clinic today unaccompanied. She states that around the last time she was in to see Dr. Valentine she had started to have a cough. 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 She also notes that multiple of her residents have been sick, a few with pneumonia. She states that since 6/14 she has had progression of this cough, though it has been better the past few days. The cough is productive but she swallows the phelgm so she isn't sure what color it is. She denies hemoptysis. She denies chest pain. She denies rhinorrhea, sinus congestion, or sore throat. She has not had any fevers or chills. She admits that she gets winded easier but that this has been happening since she was diagnosed with cancer so she isn't sure if it is related to her cough. She denies SOB at rest. No peripheral edema.    She otherwise has been doing well. No headaches or dizziness. No nausea, vomiting, abdominal pain, or bowel/bladder concerns. No rashes, joint pain, or bleeding issues. Her prior wound has healed up nicely. She is eating and drinking well. She admits it is hard to sleep with her cough but notes multiple OTC medications she can use to help this if it is OK by us. She does begin crying during the visit about being over tired and having to deal with everything that is going on, but then states she is coping as well as she can and doesn't want to meet with a counselor at this time.     Current Outpatient Prescriptions   Medication Sig Dispense Refill     acetaminophen (TYLENOL ARTHRITIS PAIN) 650 MG CR tablet Take 650 mg by mouth every 6 hours as needed for mild pain or fever       calcium carbonate (OS-JANAY 500 MG [place]. [place]) 1250 MG tablet Take 1 tablet by mouth every evening        LORazepam (ATIVAN) 0.5 MG tablet Take 1 tablet (0.5 mg) by mouth as needed for anxiety (1 mg Ativan 1 hour prior. Take additional 0.5 mg 30 minutes prior if still feeling anxious.) (Patient not taking: Reported on 6/14/2018) 20 tablet 0     LORazepam (ATIVAN) 0.5 MG tablet Take 1 tablet (0.5 mg) by mouth every 4 hours as needed (Anxiety, Nausea/Vomiting or Sleep) 30 tablet 3     MAGNESIUM PO  Take by mouth At Bedtime       prochlorperazine (COMPAZINE) 10 MG tablet Take 1 tablet (10 mg) by mouth every 6 hours as needed (Nausea/Vomiting) 30 tablet 3     Psyllium (METAMUCIL PO) Take 1 packet by mouth       VITAMIN D, CHOLECALCIFEROL, PO Take by mouth every evening          Physical Examination:  /72 (BP Location: Right arm, Patient Position: Right side, Cuff Size: Adult Regular)  Pulse 93  Temp 98.5  F (36.9  C) (Oral)  Resp 16  Wt 91.6 kg (201 lb 15.1 oz)  SpO2 93%  BMI 32.59 kg/m2  Wt Readings from Last 10 Encounters:   06/14/18 93.3 kg (205 lb 9.6 oz)   05/31/18 94.1 kg (207 lb 8 oz)   05/10/18 95.5 kg (210 lb 9.6 oz)   05/02/18 95.3 kg (210 lb)   04/26/18 96.4 kg (212 lb 9 oz)   04/13/18 98 kg (216 lb 1.8 oz)   04/05/18 97.8 kg (215 lb 11.2 oz)   04/03/18 97.7 kg (215 lb 6.4 oz)   04/03/18 97.7 kg (215 lb 4.8 oz)     Constitutional: Well-appearing female in no acute distress.  Eyes: EOMI, PERRL. No scleral icterus.  ENT: Oral mucosa is moist without lesions or thrush.   Lymphatic: Neck is supple without cervical or supraclavicular lymphadenopathy.   Cardiovascular: Regular rate and rhythm. No murmurs, gallops, or rubs. No peripheral edema.  Respiratory: Clear to auscultation bilaterally. No wheezes or crackles. Occasional coughing during visit. Walked in clinic, SpO2 96% lowest 93% with exertion.  Gastrointestinal: Bowel sounds present. Abdomen soft, non-tender. No palpable hepatosplenomegaly or masses.   Neurologic: Cranial nerves II through XII are grossly intact.  Skin: No rashes, petechiae, or bruising noted on exposed skin.    Laboratory Data:  Results for ROXANNA ASTUDILLO (MRN [id number]) as of 6/28/2018 15:49   6/28/2018 09:13   Sodium 141   Potassium 4.0   Chloride 110 (H)   Carbon Dioxide 24   Urea Nitrogen 22   Creatinine 0.79   GFR Estimate 74   GFR Estimate If Black 89   Calcium 9.0   Anion Gap 7   Albumin 3.5   Protein Total 7.5   Bilirubin Total 0.5   Alkaline Phosphatase 117    ALT 23   AST 14   T4 Free 2.18 (H)   TSH 0.01 (L)   Glucose 102 (H)   WBC 7.1   Hemoglobin 14.0   Hematocrit 43.7   Platelet Count 264   RBC Count 4.84   MCV 90   MCH 28.9   MCHC 32.0   RDW 13.2   Diff Method Automated Method   % Neutrophils 72.3   % Lymphocytes 17.4   % Monocytes 5.9   % Eosinophils 3.2   % Basophils 0.8   % Immature Granulocytes 0.4   Nucleated RBCs 0   Absolute Neutrophil 5.2   Absolute Lymphocytes 1.2   Absolute Monocytes 0.4   Absolute Eosinophils 0.2   Absolute Basophils 0.1   Abs Immature Granulocytes 0.0   Absolute Nucleated RBC 0.0       Assessment and Plan:  1. Perianal melanoma  S/p resection April 2018 with local lymph node involvement and pulmonary nodules and foci in right gastrocnemius mets concerning for metastatic disease on PET 6/14/18. Per Dr. Valentine plan to do 5 doses of every 2 week Opdivo and then repeat imaging.     She is tolerating Opdivo well. Labs today stable except for a low TSH and elevated T4. Reviewed symptoms of hyperthyroidism and she denies palpitations, new anxiety, diarrhea, heat intolerance, or trouble sleeping. Likely this will reverse to hypothyroidism as we continue treatment so will monitor today. No change in Opdivo and recheck TSH at next visit. Will see JACQUELYN prior to next infusion.    2. Cough  Admits to two weeks of productive cough without any other symptoms. Did walk in clinic and SpO2 >93% at all times and she denies SOB at rest, so less concern for immune mediated pneumonitis. That being said I did ask her to monitor her breathing closely and call with any new or worsening SOB.    Did obtain CXR today that was negative. WBC normal and she is afebrile. Most likely viral and will check RVP. Given she has had exposure to pneumonia did ask her to call if her symptoms continue to not improve as she could develop a super-imposed bacterial infection. Hold off on abx for now. Discussed how she can use any OTC cough medications based on what has been  helpful for her in the past. Did discuss how she needs to avoid any steroids while on immunotherapy.    Addendum: RVP negative.    3. Psych  Patient did become emotional during the visit talking about her symptoms and cancer. Discussed palliative care SW but she declined at this time. Continue to offer emotional support at each visit.     Misha Desouza PA-C  [facility]  [address] [place] [id number] [phone]

## 2018-06-28 NOTE — MR AVS SNAPSHOT
After Visit Summary   6/28/2018    Khloe Tamez    MRN: [id number]           Patient Information     Date Of Birth          1955        Visit Information        Provider Department      6/28/2018 10:00 AM Misha Desouza PA [facility]        Today's Diagnoses     Melanoma of anus (H)    -  1    Cough           Follow-ups after your visit        Your next 10 appointments already scheduled     Jul 02, 2018  1:30 PM CDT   Colonoscopy with Ronald Wright MD   [facility] ([facility])    [address] [place] [address]  [address]  [facility] [address]   [phone]            Jul 12, 2018 10:00 AM CDT   [facility] Lab Draw with [facility] LAB DRAW   [facility] ([facility])    [address] [place] [address]  [address]  [facility] [address]   [phone]            Jul 12, 2018 10:30 AM CDT   (Arrive by 10:15 AM)   Return Visit with HUMBLE Pedersen   [facility] ([facility])    [address] [place] [address]  [address]  [facility] [address]   [phone]            Jul 12, 2018 11:30 AM CDT   Infusion 62 with UC ONCOLOGY INFUSION, UC 22 ATC   [facility] ([facility])    [address] [place] [address]  [address]  [facility] [address]   [phone]            Jul 26, 2018  8:00 AM CDT   Masonic Lab Draw with UC MASONIC LAB DRAW   [facility] Lab Draw ([facility])    [address] [place] [address]  [address]  [facility] [address]   [phone]            Jul 26, 2018  8:30 AM CDT   Infusion 62 with UC ONCOLOGY INFUSION, UC 16 ATC   [facility] ([facility])    [address] [place] [address]  [address]  [facility] [address]   [phone]            Aug 09, 2018  8:15 AM CDT   M[facility]ic Lab Draw with UC MASONIC LAB DRAW   [facility] ([facility])    [address]  [address]  [facility] [address] [phone]            Aug 09, 2018  9:00 AM CDT   CT CHEST ABDOMEN PELVIS W/O & W CONTRAST with UCCT2   [facility] ([facility])    [address]  1st Floor  [facility] [address] [phone]           Please bring any scans or X-rays taken at other hospitals, if similar tests were done. Also bring a list of your medicines, including vitamins, minerals and over-the-counter drugs. It is safest to leave personal items at home.  Be sure to tell your doctor:   If you have any allergies.   If there s any chance you are pregnant.   If you are breastfeeding.  How to prepare:   Do not eat or drink for 2 hours before your exam. If you need to take medicine, you may take it with small sips of water. (We may ask you to take liquid medicine as well.)   Please wear loose clothing, such as a sweat suit or jogging clothes. Avoid snaps, zippers and other metal. We may ask you to undress and put on a hospital gown.  Please arrive 30 minutes early for your CT. Once in the department you might be asked to drink water 15-20 minutes prior to your exam.  If indicated you may be asked to drink an oral contrast in advance of your CT.  If this is the case, the imaging team will let you know or be in contact with you prior to your appointment  Patients over 70 or patients with diabetes or kidney problems:   If you haven t had a blood test (creatinine test) within the last 30 days, the Cardiologist/Radiologist may require you to get this test prior to your exam.  If you have diabetes:   Continue to take your metformin medication on the day of your exam  If you have any questions, please call the Imaging Department where you will have your exam.              Who to contact     If you have questions or need follow up information about today's clinic visit or your schedule please contact [facility]  directly at [phone].  Normal or non-critical lab and imaging results will be communicated to you by MyChart, letter or phone within 4 business days after the clinic has received the results. If you do not hear from us within 7 days, please contact the clinic through [employer] or phone. If you have a critical or abnormal lab result, we will notify you by phone as soon as possible.  Submit refill requests through [employer] or call your pharmacy and they will forward the refill request to us. Please allow 3 business days for your refill to be completed.          Additional Information About Your Visit        [employer] Information     [employer] gives you secure access to your electronic health record. If you see a primary care provider, you can also send messages to your care team and make appointments. If you have questions, please call your primary care clinic.  If you do not have a primary care provider, please call [phone] and they will assist you.        Care EveryWhere ID     This is your Care EveryWhere ID. This could be used by other organizations to access your [facility] medical records  [id number]        Your Vitals Were     Pulse Temperature Respirations Pulse Oximetry BMI (Body Mass Index)       93 98.5  F (36.9  C) (Oral) 16 93% 32.59 kg/m2        Blood Pressure from Last 3 Encounters:   06/28/18 127/72   06/28/18 127/72   06/14/18 135/85    Weight from Last 3 Encounters:   06/28/18 91.6 kg (202 lb)   06/28/18 91.6 kg (201 lb 15.1 oz)   06/14/18 93.3 kg (205 lb 9.6 oz)               Primary Care Provider Office Phone # Fax #    Aide Robertson [phone]       [facility] [address] [address]        Equal Access to Services     SHELIA WALKER AH: Dajuan salazar Soraisa, wamarcida luqadaha, qaybta kaalmada adeegyada, silvia guzmán. So [facility] [phone].    ATENCIÓN: Si habla español, tiene a tim disposición servicios gratuitos de  asistencia lingüística. Samantha al [phone].    We comply with applicable federal civil rights laws and [place] laws. We do not discriminate on the basis of race, color, national origin, age, disability, sex, sexual orientation, or gender identity.            Thank you!     Thank you for choosing [facility]  for your care. Our goal is always to provide you with excellent care. Hearing back from our patients is one way we can continue to improve our services. Please take a few minutes to complete the written survey that you may receive in the mail after your visit with us. Thank you!             Your Updated Medication List - Protect others around you: Learn how to safely use, store and throw away your medicines at www.disposemymeds.org.          This list is accurate as of 6/28/18  4:07 PM.  Always use your most recent med list.                   Brand Name Dispense Instructions for use Diagnosis    calcium carbonate 500 MG tablet    OS-JANAY 500 mg "[employer]. Ca     Take 1 tablet by mouth every evening        * guaiFENesin 600 MG 12 hr tablet    MUCINEX     Take 1,200 mg by mouth 2 times daily        * MUCINEX 600 MG 12 hr tablet   Generic drug:  guaiFENesin           * LORazepam 0.5 MG tablet    ATIVAN    30 tablet    Take 1 tablet (0.5 mg) by mouth every 4 hours as needed (Anxiety, Nausea/Vomiting or Sleep)    Melanoma of anus (H)       * LORazepam 0.5 MG tablet    ATIVAN    20 tablet    Take 1 tablet (0.5 mg) by mouth as needed for anxiety (1 mg Ativan 1 hour prior. Take additional 0.5 mg 30 minutes prior if still feeling anxious.)    Anxiety       MAGNESIUM PO      Take by mouth At Bedtime        METAMUCIL PO      Take 1 packet by mouth        prochlorperazine 10 MG tablet    COMPAZINE    30 tablet    Take 1 tablet (10 mg) by mouth every 6 hours as needed (Nausea/Vomiting)    Melanoma of anus (H)       TYLENOL ARTHRITIS PAIN 650 MG CR tablet   Generic drug:  acetaminophen      Take 650 mg by  mouth every 6 hours as needed for mild pain or fever        VITAMIN D (CHOLECALCIFEROL) PO      Take by mouth every evening        * Notice:  This list has 4 medication(s) that are the same as other medications prescribed for you. Read the directions carefully, and ask your doctor or other care provider to review them with you.

## 2018-06-28 NOTE — NURSING NOTE
RSV nasal swab done today per provider.  Explained procedure to patient and answered all questions.

## 2018-06-28 NOTE — PROGRESS NOTES
Infusion Nursing Note:  Khloe Tamez presents today for cycle 2 day 1 Nivolumab.    Patient seen by provider today: Yes: Misha Desouza PA-C   present during visit today: Not Applicable.    Note: Per Misha Desouza PA-C okay for nivolumab today with increased TSH, collect nasal swab and Misha will call with results.    Intravenous Access:  Peripheral IV placed.    Treatment Conditions:  Lab Results   Component Value Date    HGB 14.0 06/28/2018     Lab Results   Component Value Date    WBC 7.1 06/28/2018      Lab Results   Component Value Date    ANEU 5.2 06/28/2018     Lab Results   Component Value Date     06/28/2018      Lab Results   Component Value Date     06/28/2018                   Lab Results   Component Value Date    POTASSIUM 4.0 06/28/2018           No results found for: MAG         Lab Results   Component Value Date    CR 0.79 06/28/2018                   Lab Results   Component Value Date    JANAY 9.0 06/28/2018                Lab Results   Component Value Date    BILITOTAL 0.5 06/28/2018           Lab Results   Component Value Date    ALBUMIN 3.5 06/28/2018                    Lab Results   Component Value Date    ALT 23 06/28/2018           Lab Results   Component Value Date    AST 14 06/28/2018       Results reviewed, labs MET treatment parameters, ok to proceed with treatment.      Post Infusion Assessment:  Patient tolerated infusion without incident.  Blood return noted pre and post infusion.  Site patent and intact, free from redness, edema or discomfort.  No evidence of extravasations.  Access discontinued per protocol.    Discharge Plan:   Patient declined prescription refills.  Discharge instructions reviewed with: Patient.  Patient and/or family verbalized understanding of discharge instructions and all questions answered.  Copy of AVS reviewed with patient and/or family.  Patient will return 7/12/18 for next appointment.  Patient discharged in stable  condition accompanied by: .  Departure Mode: Ambulatory.    Lilly Hernandez RN

## 2018-06-28 NOTE — NURSING NOTE
Chief Complaint   Patient presents with     Blood Draw     vs and labs   Vitals done and labs drawn vpt in right AC. See flowsheets.    Soila Vaughn,CMA

## 2018-06-28 NOTE — NURSING NOTE
"Oncology Rooming Note    June 28, 2018 9:56 AM   Khloe Tamez is a 62 year old female who presents for:    No chief complaint on file.    Initial Vitals: /72 (BP Location: Right arm, Patient Position: Right side, Cuff Size: Adult Regular)  Pulse 93  Temp 98.5  F (36.9  C) (Oral)  Resp 16  Wt 91.6 kg (201 lb 15.1 oz)  SpO2 93%  BMI 32.59 kg/m2 Estimated body mass index is 32.59 kg/(m^2) as calculated from the following:    Height as of 6/14/18: 1.676 m (5' 6\").    Weight as of this encounter: 91.6 kg (201 lb 15.1 oz). Body surface area is 2.07 meters squared.  Data Unavailable Comment: Data Unavailable   No LMP recorded. Patient is postmenopausal.  Allergies reviewed: Yes  Medications reviewed: Yes    Medications: Medication refills not needed today.  Pharmacy name entered into [facility]: [place], [place]    Clinical concerns: Cold going around at work-She has some congestion.       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

## 2018-06-28 NOTE — LETTER
6/28/2018      RE: Khloe Tamez  [address] [address] [facility] [address]       Oncology/Hematology Visit Note  Jun 28, 2018    Reason for Visit: Follow up of perianal melanoma    History of Present Illness: Khloe Tamez is a 62 year old female with perianal melanoma. Her oncologic history is as follows:    1. 3/27/18 patient had perianal lesion biopsied in [place] ([id number]). This showed malignant melanoma, positive for HMB45 and S100, and negative for CD45 and pancytokeratin. Breslow depth at least 3.5 mm, non-ulcerated, with 5 mitoses/mm2, TILs present with deep and peripheral margins positive; pT3a.  2. 3/29/18, PET-CT showed a perianal FDG avid lesion (SUV max 10.8) and a superficial left inguinal lymph node measuring 0.9 cm (SUV max 4.1).  3. 4/13/18, she has wide local excision of a 2 x 2.5 cm perianal melanoma with anaplastic closure, under Dr. Wright. Pathology showed a nodular melanoma, Breslow depth 6.0 mm to mark's level IV, there were more than 10 mitoses per mm2, with ulceration, and lymphovascular invasion present. No perineural invasion or microsatellitosis. Tumor infiltrating lymphocytes present, non-brisk. AJCC microstage: pT4b.  4. 4/26/18, she has MRI brain, which is negative for metastatic disease.  5. 5/2/18, she returns to OR under Dr. Shah for left femoral sentinel lymph node biopsy, with 2 (of 3) involved lymph nodes. IHC positive for Melan-A, tyrosinase, and HMB-45. No definite extranodal extension. KPS 90. PD-1 0%  6. 5/31/18, started on adjuvant Nivolumab  7. 6/14/18 PETCT (for baseline purposes after surgery) with possible pulmonary and right gastrocnemius muscle metastases    She reported to triage an ongoing cough following her last infusion and as such I am seeing her to assess prior to cycle 2 day 1 Opdivo.     Interval History:  Ms. Tamez returns to clinic today unaccompanied. She states that around the last time she was in to see Dr. Valentine she had started to have  a cough. 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 She also notes that multiple of her residents have been sick, a few with pneumonia. She states that since 6/14 she has had progression of this cough, though it has been better the past few days. The cough is productive but she swallows the phelgm so she isn't sure what color it is. She denies hemoptysis. She denies chest pain. She denies rhinorrhea, sinus congestion, or sore throat. She has not had any fevers or chills. She admits that she gets winded easier but that this has been happening since she was diagnosed with cancer so she isn't sure if it is related to her cough. She denies SOB at rest. No peripheral edema.    She otherwise has been doing well. No headaches or dizziness. No nausea, vomiting, abdominal pain, or bowel/bladder concerns. No rashes, joint pain, or bleeding issues. Her prior wound has healed up nicely. She is eating and drinking well. She admits it is hard to sleep with her cough but notes multiple OTC medications she can use to help this if it is OK by us. She does begin crying during the visit about being over tired and having to deal with everything that is going on, but then states she is coping as well as she can and doesn't want to meet with a counselor at this time.     Current Outpatient Prescriptions   Medication Sig Dispense Refill     acetaminophen (TYLENOL ARTHRITIS PAIN) 650 MG CR tablet Take 650 mg by mouth every 6 hours as needed for mild pain or fever       calcium carbonate (OS-JANAY 500 MG [place]. [place]) 1250 MG tablet Take 1 tablet by mouth every evening        LORazepam (ATIVAN) 0.5 MG tablet Take 1 tablet (0.5 mg) by mouth as needed for anxiety (1 mg Ativan 1 hour prior. Take additional 0.5 mg 30 minutes prior if still feeling anxious.) (Patient not taking: Reported on 6/14/2018) 20 tablet 0     LORazepam (ATIVAN) 0.5 MG tablet Take 1 tablet (0.5 mg) by mouth every 4 hours  as needed (Anxiety, Nausea/Vomiting or Sleep) 30 tablet 3     MAGNESIUM PO Take by mouth At Bedtime       prochlorperazine (COMPAZINE) 10 MG tablet Take 1 tablet (10 mg) by mouth every 6 hours as needed (Nausea/Vomiting) 30 tablet 3     Psyllium (METAMUCIL PO) Take 1 packet by mouth       VITAMIN D, CHOLECALCIFEROL, PO Take by mouth every evening          Physical Examination:  /72 (BP Location: Right arm, Patient Position: Right side, Cuff Size: Adult Regular)  Pulse 93  Temp 98.5  F (36.9  C) (Oral)  Resp 16  Wt 91.6 kg (201 lb 15.1 oz)  SpO2 93%  BMI 32.59 kg/m2  Wt Readings from Last 10 Encounters:   06/14/18 93.3 kg (205 lb 9.6 oz)   05/31/18 94.1 kg (207 lb 8 oz)   05/10/18 95.5 kg (210 lb 9.6 oz)   05/02/18 95.3 kg (210 lb)   04/26/18 96.4 kg (212 lb 9 oz)   04/13/18 98 kg (216 lb 1.8 oz)   04/05/18 97.8 kg (215 lb 11.2 oz)   04/03/18 97.7 kg (215 lb 6.4 oz)   04/03/18 97.7 kg (215 lb 4.8 oz)     Constitutional: Well-appearing female in no acute distress.  Eyes: EOMI, PERRL. No scleral icterus.  ENT: Oral mucosa is moist without lesions or thrush.   Lymphatic: Neck is supple without cervical or supraclavicular lymphadenopathy.   Cardiovascular: Regular rate and rhythm. No murmurs, gallops, or rubs. No peripheral edema.  Respiratory: Clear to auscultation bilaterally. No wheezes or crackles. Occasional coughing during visit. Walked in clinic, SpO2 96% lowest 93% with exertion.  Gastrointestinal: Bowel sounds present. Abdomen soft, non-tender. No palpable hepatosplenomegaly or masses.   Neurologic: Cranial nerves II through XII are grossly intact.  Skin: No rashes, petechiae, or bruising noted on exposed skin.    Laboratory Data:  Results for ROXANNA ASTUDILLO (MRN [id number]) as of 6/28/2018 15:49   6/28/2018 09:13   Sodium 141   Potassium 4.0   Chloride 110 (H)   Carbon Dioxide 24   Urea Nitrogen 22   Creatinine 0.79   GFR Estimate 74   GFR Estimate If Black 89   Calcium 9.0   Anion Gap 7   Albumin  3.5   Protein Total 7.5   Bilirubin Total 0.5   Alkaline Phosphatase 117   ALT 23   AST 14   T4 Free 2.18 (H)   TSH 0.01 (L)   Glucose 102 (H)   WBC 7.1   Hemoglobin 14.0   Hematocrit 43.7   Platelet Count 264   RBC Count 4.84   MCV 90   MCH 28.9   MCHC 32.0   RDW 13.2   Diff Method Automated Method   % Neutrophils 72.3   % Lymphocytes 17.4   % Monocytes 5.9   % Eosinophils 3.2   % Basophils 0.8   % Immature Granulocytes 0.4   Nucleated RBCs 0   Absolute Neutrophil 5.2   Absolute Lymphocytes 1.2   Absolute Monocytes 0.4   Absolute Eosinophils 0.2   Absolute Basophils 0.1   Abs Immature Granulocytes 0.0   Absolute Nucleated RBC 0.0       Assessment and Plan:  1. Perianal melanoma  S/p resection April 2018 with local lymph node involvement and pulmonary nodules and foci in right gastrocnemius mets concerning for metastatic disease on PET 6/14/18. Per Dr. Valentine plan to do 5 doses of every 2 week Opdivo and then repeat imaging.     She is tolerating Opdivo well. Labs today stable except for a low TSH and elevated T4. Reviewed symptoms of hyperthyroidism and she denies palpitations, new anxiety, diarrhea, heat intolerance, or trouble sleeping. Likely this will reverse to hypothyroidism as we continue treatment so will monitor today. No change in Opdivo and recheck TSH at next visit. Will see JACQUELYN prior to next infusion.    2. Cough  Admits to two weeks of productive cough without any other symptoms. Did walk in clinic and SpO2 >93% at all times and she denies SOB at rest, so less concern for immune mediated pneumonitis. That being said I did ask her to monitor her breathing closely and call with any new or worsening SOB.    Did obtain CXR today that was negative. WBC normal and she is afebrile. Most likely viral and will check RVP. Given she has had exposure to pneumonia did ask her to call if her symptoms continue to not improve as she could develop a super-imposed bacterial infection. Hold off on abx for now.  Discussed how she can use any OTC cough medications based on what has been helpful for her in the past. Did discuss how she needs to avoid any steroids while on immunotherapy.    Addendum: RVP negative.    3. Psych  Patient did become emotional during the visit talking about her symptoms and cancer. Discussed palliative care SW but she declined at this time. Continue to offer emotional support at each visit.     Misha Desouza PA-C  [facility]  [address] [place] [address]  [phone]      HUMBLE Pedersen

## 2018-06-28 NOTE — PATIENT INSTRUCTIONS
Contact Numbers    [facility] Main Line: [phone]  [facility] Triage and after hours / weekends / holidays:  [phone]      Please call the triage or after hours line if you experience a temperature greater than or equal to 100.5, shaking chills, have uncontrolled nausea, vomiting and/or diarrhea, dizziness, shortness of breath, chest pain, bleeding, unexplained bruising, or if you have any other new/concerning symptoms, questions or concerns.      If you are having any concerning symptoms or wish to speak to a provider before your next infusion visit, please call your care coordinator or triage to notify them so we can adequately serve you.     If you need a refill on a narcotic prescription or other medication, please call before your infusion appointment.                   June 2018 Sunday Monday Tuesday Wednesday Thursday Friday Saturday                            1     2       3     4     5     6     7     8     9       10     11     12     13     14     PET ONCOLOGY WHOLE BODY    7:45 AM   (45 min.)   UUPET1   [facility] [place] PET CT     [facility] LAB DRAW   10:15 AM   (15 min.)   [facility] LAB DRAW   [facility] Lab Draw     [facility] RETURN   10:45 AM   (30 min.)   Nigel Gauthier MD   [facility] ONC INFUSION 60   11:30 AM   (60 min.)    ONCOLOGY INFUSION   [facility] 15     16       17     18     19     20     21     22     23       24     25     26     27     28     XR CHEST 2 VIEWS    8:45 AM   (15 min.)   UCXR1   [facility] Xray     [facility] LAB DRAW    9:00 AM   (15 min.)    MAS[facility] LAB DRAW   [facility] Lab Draw     [facility] RETURN    9:45 AM   (50 min.)   Misha Desouza PA   [facility] ONC INFUSION 60   11:30 AM   (60 min.)    ONCOLOGY INFUSION   [facility] 29 30 July 2018 Sunday Monday Tuesday Wednesday Thursday Friday Saturday   1     2     [facility]  COLONOSCOPY    1:30 PM   (30 min.)   Ronald Wright MD   [facility] 3     4     5     6     7       8     9     10     11     12     [facility] LAB DRAW   10:00 AM   (15 min.)    [facility] LAB DRAW   [facility] Lab Draw     [facility] RETURN   10:15 AM   (50 min.)   Misha Desouza PA   [facility] ONC INFUSION 60   11:30 AM   (60 min.)    ONCOLOGY INFUSION   [facility] 13     14       15     16     17     18     19     20     21       22     23     24     25     26     [facility] LAB DRAW    8:00 AM   (15 min.)    M[facility] LAB DRAW   [facility] Lab Draw     [facility] ONC INFUSION 60    8:30 AM   (60 min.)    ONCOLOGY INFUSION   [facility] 27     28       29     30     31                                      Lab Results:  Recent Results (from the past 12 hour(s))   Comprehensive metabolic panel    Collection Time: 06/28/18  9:13 AM   Result Value Ref Range    Sodium 141 133 - 144 mmol/L    Potassium 4.0 3.4 - 5.3 mmol/L    Chloride 110 (H) 94 - 109 mmol/L    Carbon Dioxide 24 20 - 32 mmol/L    Anion Gap 7 3 - 14 mmol/L    Glucose 102 (H) 70 - 99 mg/dL    Urea Nitrogen 22 7 - 30 mg/dL    Creatinine 0.79 0.52 - 1.04 mg/dL    GFR Estimate 74 >60 mL/min/1.7m2    GFR Estimate If Black 89 >60 mL/min/1.7m2    Calcium 9.0 8.5 - 10.1 mg/dL    Bilirubin Total 0.5 0.2 - 1.3 mg/dL    Albumin 3.5 3.4 - 5.0 g/dL    Protein Total 7.5 6.8 - 8.8 g/dL    Alkaline Phosphatase 117 40 - 150 U/L    ALT 23 0 - 50 U/L    AST 14 0 - 45 U/L   TSH with free T4 reflex    Collection Time: 06/28/18  9:13 AM   Result Value Ref Range    TSH 0.01 (L) 0.40 - 4.00 mU/L   CBC with platelets differential    Collection Time: 06/28/18  9:13 AM   Result Value Ref Range    WBC 7.1 4.0 - 11.0 10e9/L    RBC Count 4.84 3.8 - 5.2 10e12/L    Hemoglobin 14.0 11.7 - 15.7 g/dL    Hematocrit 43.7 35.0 - 47.0 %    MCV 90 78 - 100 fl    MCH 28.9 26.5 - 33.0 pg    MCHC  32.0 31.5 - 36.5 g/dL    RDW 13.2 10.0 - 15.0 %    Platelet Count 264 150 - 450 10e9/L    Diff Method Automated Method     % Neutrophils 72.3 %    % Lymphocytes 17.4 %    % Monocytes 5.9 %    % Eosinophils 3.2 %    % Basophils 0.8 %    % Immature Granulocytes 0.4 %    Nucleated RBCs 0 0 /100    Absolute Neutrophil 5.2 1.6 - 8.3 10e9/L    Absolute Lymphocytes 1.2 0.8 - 5.3 10e9/L    Absolute Monocytes 0.4 0.0 - 1.3 10e9/L    Absolute Eosinophils 0.2 0.0 - 0.7 10e9/L    Absolute Basophils 0.1 0.0 - 0.2 10e9/L    Abs Immature Granulocytes 0.0 0 - 0.4 10e9/L    Absolute Nucleated RBC 0.0    T4 free    Collection Time: 06/28/18  9:13 AM   Result Value Ref Range    T4 Free 2.18 (H) 0.76 - 1.46 ng/dL

## 2018-06-28 NOTE — MR AVS SNAPSHOT
After Visit Summary   6/28/2018    Khloe Tamez    MRN: [id number]           Patient Information     Date Of Birth          1955        Visit Information        Provider Department      6/28/2018 11:30 AM  11 ATC;  ONCOLOGY INFUSION [facility]        Today's Diagnoses     Melanoma of anus (H)    -  1    Metastatic melanoma (H)        Cough          Care Instructions    Contact Numbers    [facility] Main Line: [phone]  [facility] Triage and after hours / weekends / holidays:  [phone]      Please call the triage or after hours line if you experience a temperature greater than or equal to 100.5, shaking chills, have uncontrolled nausea, vomiting and/or diarrhea, dizziness, shortness of breath, chest pain, bleeding, unexplained bruising, or if you have any other new/concerning symptoms, questions or concerns.      If you are having any concerning symptoms or wish to speak to a provider before your next infusion visit, please call your care coordinator or triage to notify them so we can adequately serve you.     If you need a refill on a narcotic prescription or other medication, please call before your infusion appointment.                   June 2018 Sunday Monday Tuesday Wednesday Thursday Friday Saturday                            1     2       3     4     5     6     7     8     9       10     11     12     13     14     PET ONCOLOGY WHOLE BODY    7:45 AM   (45 min.)   UUPET1   [facility] [place] PET CT     [facility]   10:15 AM   (15 min.)   [facility] LAB DRAW   [facility] Lab Draw     [facility] RETURN   10:45 AM   (30 min.)   Nigel Gauthier MD   [facility] ONC INFUSION 60   11:30 AM   (60 min.)    ONCOLOGY INFUSION   [facility] 15     16       17     18     19     20     21     22     23       24     25     26     27     28     XR CHEST 2 VIEWS    8:45 AM   (15 min.)   UCXR1   [facility]  Xray     [facility] LAB DRAW    9:00 AM   (15 min.)    M[facility] LAB DRAW   [facility] Lab Draw     [facility] RETURN    9:45 AM   (50 min.)   Misha Desouza PA   [facility] ONC INFUSION 60   11:30 AM   (60 min.)   UC ONCOLOGY INFUSION   [facility] 29 30 July 2018 Sunday Monday Tuesday Wednesday Thursday Friday Saturday   1     2     [facility] COLONOSCOPY    1:30 PM   (30 min.)   Ronald Wright MD   [facility] 3     4     5     6     7       8     9     10     11     12     [facility] LAB DRAW   10:00 AM   (15 min.)    M[facility] LAB DRAW   [facility] Lab Draw     P RETURN   10:15 AM   (50 min.)   Misha Desouza PA   [facility] ONC INFUSION 60   11:30 AM   (60 min.)   UC ONCOLOGY INFUSION   [facility] 13     14       15     16     17     18     19     20     21       22     23     24     25     26     [facility] LAB DRAW    8:00 AM   (15 min.)    M[facility]ON[facility] LAB DRAW   [facility] Lab Draw     [facility] ONC INFUSION 60    8:30 AM   (60 min.)   UC ONCOLOGY INFUSION   [facility] 27     28       29     30     31                                      Lab Results:  Recent Results (from the past 12 hour(s))   Comprehensive metabolic panel    Collection Time: 06/28/18  9:13 AM   Result Value Ref Range    Sodium 141 133 - 144 mmol/L    Potassium 4.0 3.4 - 5.3 mmol/L    Chloride 110 (H) 94 - 109 mmol/L    Carbon Dioxide 24 20 - 32 mmol/L    Anion Gap 7 3 - 14 mmol/L    Glucose 102 (H) 70 - 99 mg/dL    Urea Nitrogen 22 7 - 30 mg/dL    Creatinine 0.79 0.52 - 1.04 mg/dL    GFR Estimate 74 >60 mL/min/1.7m2    GFR Estimate If Black 89 >60 mL/min/1.7m2    Calcium 9.0 8.5 - 10.1 mg/dL    Bilirubin Total 0.5 0.2 - 1.3 mg/dL    Albumin 3.5 3.4 - 5.0 g/dL    Protein Total 7.5 6.8 - 8.8 g/dL    Alkaline Phosphatase 117 40 - 150 U/L    ALT 23 0 - 50 U/L    AST 14 0 - 45  U/L   TSH with free T4 reflex    Collection Time: 06/28/18  9:13 AM   Result Value Ref Range    TSH 0.01 (L) 0.40 - 4.00 mU/L   CBC with platelets differential    Collection Time: 06/28/18  9:13 AM   Result Value Ref Range    WBC 7.1 4.0 - 11.0 10e9/L    RBC Count 4.84 3.8 - 5.2 10e12/L    Hemoglobin 14.0 11.7 - 15.7 g/dL    Hematocrit 43.7 35.0 - 47.0 %    MCV 90 78 - 100 fl    MCH 28.9 26.5 - 33.0 pg    MCHC 32.0 31.5 - 36.5 g/dL    RDW 13.2 10.0 - 15.0 %    Platelet Count 264 150 - 450 10e9/L    Diff Method Automated Method     % Neutrophils 72.3 %    % Lymphocytes 17.4 %    % Monocytes 5.9 %    % Eosinophils 3.2 %    % Basophils 0.8 %    % Immature Granulocytes 0.4 %    Nucleated RBCs 0 0 /100    Absolute Neutrophil 5.2 1.6 - 8.3 10e9/L    Absolute Lymphocytes 1.2 0.8 - 5.3 10e9/L    Absolute Monocytes 0.4 0.0 - 1.3 10e9/L    Absolute Eosinophils 0.2 0.0 - 0.7 10e9/L    Absolute Basophils 0.1 0.0 - 0.2 10e9/L    Abs Immature Granulocytes 0.0 0 - 0.4 10e9/L    Absolute Nucleated RBC 0.0    T4 free    Collection Time: 06/28/18  9:13 AM   Result Value Ref Range    T4 Free 2.18 (H) 0.76 - 1.46 ng/dL               Follow-ups after your visit        Your next 10 appointments already scheduled     Jul 02, 2018  1:30 PM CDT   Colonoscopy with Ronald Wright MD   [facility] ([facility])    [address]  [address]  [place] [address] [phone]            Jul 12, 2018 10:00 AM CDT   [facility] Lab Draw with  MASONIC LAB DRAW   [facility] Lab Draw ([facility])    [address]  [address]  [place] [facility] [place] [facility] [address] [phone]            Jul 12, 2018 10:30 AM CDT   (Arrive by 10:15 AM)   Return Visit with HUMBLE Pedersen   [facility] ([facility])    [address]  [address]  [facility] [place] [place] [facility] [address] [phone]            Jul 12, 2018 11:30 AM CDT   Infusion 60 with UC  ONCOLOGY INFUSION, UC 22 ATC   [facility] ([facility])    [address]  [address]  [place] [address]   [phone]            Jul 26, 2018  8:00 AM CDT   [facility] Lab Draw with  M[facility] LAB DRAW   [facility]    [address]  [address]  [place] [facility] [address]   [phone]            Jul 26, 2018  8:30 AM CDT   Infusion 60 with UC ONCOLOGY INFUSION, UC 16 ATC   [facility] ([facility])    [address]  [address]  [place] [facility] [address]   [phone]            Aug 09, 2018  8:15 AM CDT   M[facility] Lab Draw with  MASONIC LAB DRAW   [facility] Mason[facility] Lab Draw ([facility])    [address]  [address]  [place] [facility] [address]   [phone]            Aug 09, 2018  9:00 AM CDT   CT CHEST ABDOMEN PELVIS W/O & W CONTRAST with UCCT2   [facility] CT ([facility])    [address]  [address]  [place] [address]   [phone]           Please bring any scans or X-rays taken at other hospitals, if similar tests were done. Also bring a list of your medicines, including vitamins, minerals and over-the-counter drugs. It is safest to leave personal items at home.  Be sure to tell your doctor:   If you have any allergies.   If there s any chance you are pregnant.   If you are breastfeeding.  How to prepare:   Do not eat or drink for 2 hours before your exam. If you need to take medicine, you may take it with small sips of water. (We may ask you to take liquid medicine as well.)   Please wear loose clothing, such as a sweat suit or jogging clothes. Avoid snaps, zippers and other metal. We may ask you to undress and put on a hospital gown.  Please arrive 30 minutes early for your CT. Once in the department you might be asked to drink water 15-20 minutes prior to your  exam.  If indicated you may be asked to drink an oral contrast in advance of your CT.  If this is the case, the imaging team will let you know or be in contact with you prior to your appointment  Patients over 70 or patients with diabetes or kidney problems:   If you haven t had a blood test (creatinine test) within the last 30 days, the Cardiologist/Radiologist may require you to get this test prior to your exam.  If you have diabetes:   Continue to take your metformin medication on the day of your exam  If you have any questions, please call the Imaging Department where you will have your exam.              Who to contact     If you have questions or need follow up information about today's clinic visit or your schedule please contact [facility] directly at [phone].  Normal or non-critical lab and imaging results will be communicated to you by MyChart, letter or phone within 4 business days after the clinic has received the results. If you do not hear from us within 7 days, please contact the clinic through [email or web address] or phone. If you have a critical or abnormal lab result, we will notify you by phone as soon as possible.  Submit refill requests through [employer] or call your pharmacy and they will forward the refill request to us. Please allow 3 business days for your refill to be completed.          Additional Information About Your Visit        [employer] Information     [employer] gives you secure access to your electronic health record. If you see a primary care provider, you can also send messages to your care team and make appointments. If you have questions, please call your primary care clinic.  If you do not have a primary care provider, please call [phone] and they will assist you.        Care EveryWhere ID     This is your Care EveryWhere ID. This could be used by other organizations to access your [place] medical records  [id number]        Your Vitals Were     Pulse Temperature  Respirations Pulse Oximetry BMI (Body Mass Index)       89 98.5  F (36.9  C) (Oral) 16 93% 32.6 kg/m2        Blood Pressure from Last 3 Encounters:   06/28/18 127/72   06/28/18 127/72   06/14/18 135/85    Weight from Last 3 Encounters:   06/28/18 91.6 kg (202 lb)   06/28/18 91.6 kg (201 lb 15.1 oz)   06/14/18 93.3 kg (205 lb 9.6 oz)              We Performed the Following     CBC with platelets differential     Comprehensive metabolic panel     Respiratory Virus Panel by PCR     T4 free     TSH with free T4 reflex        Primary Care Provider Office Phone # Fax #    Aide Robertson [phone]       [address] [address]        Equal Access to Services     SHELIA WALKER : Dajuan Cox, wasebas gallardo, zac lara, silvia guzmán. So [facility] [phone].    ATENCIÓN: Si habla español, tiene a tim disposición servicios gratuitos de asistencia lingüística. Samantha al [phone].    We comply with applicable federal civil rights laws and [place] laws. We do not discriminate on the basis of race, color, national origin, age, disability, sex, sexual orientation, or gender identity.            Thank you!     Thank you for choosing [facility]  for your care. Our goal is always to provide you with excellent care. Hearing back from our patients is one way we can continue to improve our services. Please take a few minutes to complete the written survey that you may receive in the mail after your visit with us. Thank you!             Your Updated Medication List - Protect others around you: Learn how to safely use, store and throw away your medicines at www.disposemymeds.org.          This list is accurate as of 6/28/18 12:17 PM.  Always use your most recent med list.                   Brand Name Dispense Instructions for use Diagnosis    calcium carbonate 500 MG tablet    OS-JANAY 500 mg [place].  Ca     Take 1 tablet by mouth every evening        * guaiFENesin 600 MG 12 hr tablet    MUCINEX     Take 1,200 mg by mouth 2 times daily        * MUCINEX 600 MG 12 hr tablet   Generic drug:  guaiFENesin           * LORazepam 0.5 MG tablet    ATIVAN    30 tablet    Take 1 tablet (0.5 mg) by mouth every 4 hours as needed (Anxiety, Nausea/Vomiting or Sleep)    Melanoma of anus (H)       * LORazepam 0.5 MG tablet    ATIVAN    20 tablet    Take 1 tablet (0.5 mg) by mouth as needed for anxiety (1 mg Ativan 1 hour prior. Take additional 0.5 mg 30 minutes prior if still feeling anxious.)    Anxiety       MAGNESIUM PO      Take by mouth At Bedtime        METAMUCIL PO      Take 1 packet by mouth        prochlorperazine 10 MG tablet    COMPAZINE    30 tablet    Take 1 tablet (10 mg) by mouth every 6 hours as needed (Nausea/Vomiting)    Melanoma of anus (H)       TYLENOL ARTHRITIS PAIN 650 MG CR tablet   Generic drug:  acetaminophen      Take 650 mg by mouth every 6 hours as needed for mild pain or fever        VITAMIN D (CHOLECALCIFEROL) PO      Take by mouth every evening        * Notice:  This list has 4 medication(s) that are the same as other medications prescribed for you. Read the directions carefully, and ask your doctor or other care provider to review them with you.

## 2018-07-11 NOTE — TELEPHONE ENCOUNTER
Form Request Documentation    Date Received in Clinic:  n/a  Name/Type of Form: FMLA   Questions that need to be addressed:   Current Employment Status: currently working on a32-40 hours a week   Amount of Leave Requested: Intermittent Leave beginning 7/1/18 for 6 days per month  Other: None  Date Completed: 7/10/2018  Copy Mailed to patient: Yes on 7/11/2018  Disposition of Form: Fax to Esthela Rodríguez at [phone] on 7/10/18

## 2018-07-12 NOTE — NURSING NOTE
Chief Complaint   Patient presents with     Oncology Clinic Visit     VPT drawn by MA   Vitals done and labs drawn, see flow sheets.  OTTO MonetA

## 2018-07-12 NOTE — PATIENT INSTRUCTIONS
Contact Numbers    [facility] Main Line: [phone]  [facility] Triage and after hours / weekends / holidays:  [phone]      Please call the triage or after hours line if you experience a temperature greater than or equal to 100.5, shaking chills, have uncontrolled nausea, vomiting and/or diarrhea, dizziness, shortness of breath, chest pain, bleeding, unexplained bruising, or if you have any other new/concerning symptoms, questions or concerns.      If you are having any concerning symptoms or wish to speak to a provider before your next infusion visit, please call your care coordinator or triage to notify them so we can adequately serve you.     If you need a refill on a narcotic prescription or other medication, please call before your infusion appointment.                   July 2018 Sunday Monday Tuesday Wednesday Thursday Friday Saturday   1     2     [facility] COLONOSCOPY    1:30 PM   (30 min.)   Ronald Wright MD   [facility] 3     4     5     6     7       8     9     10     11     12     P [facility]ON[facility] LAB DRAW   10:00 AM   (15 min.)    MASONIC LAB DRAW   [facility] Masonic Lab Draw     [facility] ONC INFUSION 60   11:30 AM   (60 min.)    ONCOLOGY INFUSION   [facility] 13     14       15     16     17     18     19     20     21       22     23     24     25     26     [facility] LAB DRAW    8:00 AM   (15 min.)    MASONIC LAB DRAW   [facility] Mason[facility] Lab Draw     [facility] ONC INFUSION 60    8:30 AM   (60 min.)    ONCOLOGY INFUSION   [facility] 27     28       29     30     31                                    August 2018 Sunday Monday Tuesday Wednesday Thursday Friday Saturday                  1     2     3     4       5     6     7     8     9     P [facility]ON[facility] LAB DRAW    8:15 AM   (15 min.)    MASONIC LAB DRAW   [facility] Masonic Lab Draw     CT CHEST ABDOMEN PELVIS WWO    9:00 AM   (20 min.)   UCCT2   [facility] Imaging [place] CT     [facility] RETURN   10:45 AM   (30  min.)   Nigel Gauthier MD   [facility]     [facility] ONC INFUSION 60   11:30 AM   (60 min.)   UC ONCOLOGY INFUSION   [facility] 10     11       12     13     14     15     16     17     18       19     20     21     22     23     24     25       26     27     28     29     30     31                       Lab Results:  Recent Results (from the past 12 hour(s))   Comprehensive metabolic panel    Collection Time: 07/12/18  9:56 AM   Result Value Ref Range    Sodium 141 133 - 144 mmol/L    Potassium 4.2 3.4 - 5.3 mmol/L    Chloride 110 (H) 94 - 109 mmol/L    Carbon Dioxide 22 20 - 32 mmol/L    Anion Gap 9 3 - 14 mmol/L    Glucose 97 70 - 99 mg/dL    Urea Nitrogen 22 7 - 30 mg/dL    Creatinine 0.73 0.52 - 1.04 mg/dL    GFR Estimate 82 >60 mL/min/1.7m2    GFR Estimate If Black >90 >60 mL/min/1.7m2    Calcium 9.0 8.5 - 10.1 mg/dL    Bilirubin Total 0.4 0.2 - 1.3 mg/dL    Albumin 3.5 3.4 - 5.0 g/dL    Protein Total 7.5 6.8 - 8.8 g/dL    Alkaline Phosphatase 112 40 - 150 U/L    ALT 23 0 - 50 U/L    AST 15 0 - 45 U/L   TSH with free T4 reflex    Collection Time: 07/12/18  9:56 AM   Result Value Ref Range    TSH <0.01 (L) 0.40 - 4.00 mU/L   CBC with platelets differential    Collection Time: 07/12/18  9:56 AM   Result Value Ref Range    WBC 6.0 4.0 - 11.0 10e9/L    RBC Count 4.97 3.8 - 5.2 10e12/L    Hemoglobin 14.3 11.7 - 15.7 g/dL    Hematocrit 43.4 35.0 - 47.0 %    MCV 87 78 - 100 fl    MCH 28.8 26.5 - 33.0 pg    MCHC 32.9 31.5 - 36.5 g/dL    RDW 12.9 10.0 - 15.0 %    Platelet Count 295 150 - 450 10e9/L    Diff Method Automated Method     % Neutrophils 65.6 %    % Lymphocytes 21.4 %    % Monocytes 6.0 %    % Eosinophils 6.0 %    % Basophils 0.8 %    % Immature Granulocytes 0.2 %    Nucleated RBCs 0 0 /100    Absolute Neutrophil 4.0 1.6 - 8.3 10e9/L    Absolute Lymphocytes 1.3 0.8 - 5.3 10e9/L    Absolute Monocytes 0.4 0.0 - 1.3 10e9/L    Absolute Eosinophils 0.4 0.0 - 0.7 10e9/L     Absolute Basophils 0.1 0.0 - 0.2 10e9/L    Abs Immature Granulocytes 0.0 0 - 0.4 10e9/L    Absolute Nucleated RBC 0.0    T4 free    Collection Time: 07/12/18  9:56 AM   Result Value Ref Range    T4 Free 1.80 (H) 0.76 - 1.46 ng/dL

## 2018-07-12 NOTE — PROGRESS NOTES
Infusion Nursing Note:  Khloe Tamez presents today for Day 15 Cycle 2 Opdivo.    Patient seen by provider today: No   present during visit today: Not Applicable.    Note: Patient presents to infusion stating that she is feeling generally well. Patient reports generalized fatigue. Patient also reports that her cough she has been experiencing is improved. Denies any recent fevers or chills. Declines dizziness/lightheadedness, shortness of breath, nausea/vomiting, constipation/diarrhea, neuropathy, or mouth sores. Denies pain today.    TORB 7/12/2018 11:45 AM HUMBLE Pedersen/Madhav Tee, RN  -Assess patient for signs and symptoms of hyperthyroidism.  -Patient confirms that she is not experiencing any signs and symptoms of hyperthyroidism.  -Patient to call nurse triage line if she develops any signs and symptoms of hyperthyroidism. Patient verbalized understanding.    Intravenous Access:  Peripheral IV placed per Vascular Access RN.    Treatment Conditions:  Lab Results   Component Value Date    HGB 14.3 07/12/2018     Lab Results   Component Value Date    WBC 6.0 07/12/2018      Lab Results   Component Value Date    ANEU 4.0 07/12/2018     Lab Results   Component Value Date     07/12/2018      Lab Results   Component Value Date     07/12/2018                   Lab Results   Component Value Date    POTASSIUM 4.2 07/12/2018           No results found for: MAG         Lab Results   Component Value Date    CR 0.73 07/12/2018                   Lab Results   Component Value Date    JANAY 9.0 07/12/2018                Lab Results   Component Value Date    BILITOTAL 0.4 07/12/2018           Lab Results   Component Value Date    ALBUMIN 3.5 07/12/2018                    Lab Results   Component Value Date    ALT 23 07/12/2018           Lab Results   Component Value Date    AST 15 07/12/2018       Results reviewed, labs MET treatment parameters, ok to proceed with treatment.      Post Infusion  Assessment:  Patient tolerated infusion without incident.  Blood return noted pre and post infusion.  Site patent and intact, free from redness, edema or discomfort.  No evidence of extravasations.  Access discontinued per protocol.    Discharge Plan:   Patient declined prescription refills.  Discharge instructions reviewed with: Patient and Family.  Patient and/or family verbalized understanding of discharge instructions and all questions answered.  AVS to patient via [employer].  Patient will return 7/26/18 for next appointment.   Patient discharged in stable condition accompanied by: self and .  Departure Mode: Ambulatory.  Face to Face time: 0 minutes.    Nathanael Tee RN

## 2018-07-12 NOTE — MR AVS SNAPSHOT
After Visit Summary   7/12/2018    Khloe Tamez    MRN: [id number]           Patient Information     Date Of Birth          1955        Visit Information        Provider Department      7/12/2018 11:30 AM  22 ATC;  ONCOLOGY INFUSION [facility]        Today's Diagnoses     Melanoma of anus (H)    -  1      Care Instructions    Contact Numbers    [facility] [place]: [phone]  [facility] [place] and after hours / weekends / holidays:  [phone]      Please call the triage or after hours line if you experience a temperature greater than or equal to 100.5, shaking chills, have uncontrolled nausea, vomiting and/or diarrhea, dizziness, shortness of breath, chest pain, bleeding, unexplained bruising, or if you have any other new/concerning symptoms, questions or concerns.      If you are having any concerning symptoms or wish to speak to a provider before your next infusion visit, please call your care coordinator or triage to notify them so we can adequately serve you.     If you need a refill on a narcotic prescription or other medication, please call before your infusion appointment.                   July 2018 Sunday Monday Tuesday Wednesday Thursday Friday Saturday   1     2     [facility] COLONOSCOPY    1:30 PM   (30 min.)   Ronald Wright MD   [facility] 3     4     5     6     7       8     9     10     11     12     [facility] DRAW   10:00 AM   (15 min.)    [facility] DRAW   [facility] Lab Draw     [facility] ONC INFUSION 60   11:30 AM   (60 min.)    ONCOLOGY INFUSION   [facility] 13     14       15     16     17     18     19     20     21       22     23     24     25     26     [facility] DRAW    8:00 AM   (15 min.)    [facility] DRAW   [facility] Lab Draw     [facility] ONC INFUSION 60    8:30 AM   (60 min.)    ONCOLOGY INFUSION   [facility] 27     28       29     30     31                                     August 2018 Sunday Monday Tuesday Wednesday Thursday Friday Saturday                  1     2     3     4       5     6     7     8     9     [facility] DRAW    8:15 AM   (15 min.)    [facility]   [facility] Lab Draw     CT CHEST ABDOMEN PELVIS WWO    9:00 AM   (20 min.)   UCCT2   [facility]     [facility] RETURN   10:45 AM   (30 min.)   Nigel Gauthier MD   [facility] ONC INFUSION 60   11:30 AM   (60 min.)    ONCOLOGY INFUSION   [facility] 10     11       12     13     14     15     16     17     18       19     20     21     22     23     24     25       26     27     28     29     30     31                       Lab Results:  Recent Results (from the past 12 hour(s))   Comprehensive metabolic panel    Collection Time: 07/12/18  9:56 AM   Result Value Ref Range    Sodium 141 133 - 144 mmol/L    Potassium 4.2 3.4 - 5.3 mmol/L    Chloride 110 (H) 94 - 109 mmol/L    Carbon Dioxide 22 20 - 32 mmol/L    Anion Gap 9 3 - 14 mmol/L    Glucose 97 70 - 99 mg/dL    Urea Nitrogen 22 7 - 30 mg/dL    Creatinine 0.73 0.52 - 1.04 mg/dL    GFR Estimate 82 >60 mL/min/1.7m2    GFR Estimate If Black >90 >60 mL/min/1.7m2    Calcium 9.0 8.5 - 10.1 mg/dL    Bilirubin Total 0.4 0.2 - 1.3 mg/dL    Albumin 3.5 3.4 - 5.0 g/dL    Protein Total 7.5 6.8 - 8.8 g/dL    Alkaline Phosphatase 112 40 - 150 U/L    ALT 23 0 - 50 U/L    AST 15 0 - 45 U/L   TSH with free T4 reflex    Collection Time: 07/12/18  9:56 AM   Result Value Ref Range    TSH <0.01 (L) 0.40 - 4.00 mU/L   CBC with platelets differential    Collection Time: 07/12/18  9:56 AM   Result Value Ref Range    WBC 6.0 4.0 - 11.0 10e9/L    RBC Count 4.97 3.8 - 5.2 10e12/L    Hemoglobin 14.3 11.7 - 15.7 g/dL    Hematocrit 43.4 35.0 - 47.0 %    MCV 87 78 - 100 fl    MCH 28.8 26.5 - 33.0 pg    MCHC 32.9 31.5 - 36.5 g/dL    RDW 12.9 10.0 - 15.0 %    Platelet Count 295 150 - 450 10e9/L    Diff  Method Automated Method     % Neutrophils 65.6 %    % Lymphocytes 21.4 %    % Monocytes 6.0 %    % Eosinophils 6.0 %    % Basophils 0.8 %    % Immature Granulocytes 0.2 %    Nucleated RBCs 0 0 /100    Absolute Neutrophil 4.0 1.6 - 8.3 10e9/L    Absolute Lymphocytes 1.3 0.8 - 5.3 10e9/L    Absolute Monocytes 0.4 0.0 - 1.3 10e9/L    Absolute Eosinophils 0.4 0.0 - 0.7 10e9/L    Absolute Basophils 0.1 0.0 - 0.2 10e9/L    Abs Immature Granulocytes 0.0 0 - 0.4 10e9/L    Absolute Nucleated RBC 0.0    T4 free    Collection Time: 07/12/18  9:56 AM   Result Value Ref Range    T4 Free 1.80 (H) 0.76 - 1.46 ng/dL               Follow-ups after your visit        Your next 10 appointments already scheduled     Jul 26, 2018  8:00 AM CDT   [facility] Draw with  [facility] DRAW   [facility] Draw ([facility])    [address]  [address]  [place] [address] [phone]            Jul 26, 2018  8:30 AM CDT   Infusion 60 with UC ONCOLOGY INFUSION, UC 16 ATC   [facility] ([facility])    [address]  [address]  [place] [address] [phone]            Aug 09, 2018  8:15 AM CDT   [facility] Draw with [facility]RAW   [facility]    [address] [place] [address]  [address]  [place] [address]   [phone]            Aug 09, 2018  9:00 AM CDT   CT CHEST ABDOMEN PELVIS W/O & W CONTRAST with UCCT2   [facility] [place] [facility] ([facility])    [address]  1st Floor  [place] [address] [phone]           Please bring any scans or X-rays taken at other hospitals, if similar tests were done. Also bring a list of your medicines, including vitamins, minerals and over-the-counter drugs. It is safest to leave personal items at home.  Be sure to tell your doctor:   If you have any allergies.   If there s any chance you are  pregnant.   If you are breastfeeding.  How to prepare:   Do not eat or drink for 2 hours before your exam. If you need to take medicine, you may take it with small sips of water. (We may ask you to take liquid medicine as well.)   Please wear loose clothing, such as a sweat suit or jogging clothes. Avoid snaps, zippers and other metal. We may ask you to undress and put on a hospital gown.  Please arrive 30 minutes early for your CT. Once in the department you might be asked to drink water 15-20 minutes prior to your exam.  If indicated you may be asked to drink an oral contrast in advance of your CT.  If this is the case, the imaging team will let you know or be in contact with you prior to your appointment  Patients over 70 or patients with diabetes or kidney problems:   If you haven t had a blood test (creatinine test) within the last 30 days, the Cardiologist/Radiologist may require you to get this test prior to your exam.  If you have diabetes:   Continue to take your metformin medication on the day of your exam  If you have any questions, please call the Imaging Department where you will have your exam.            Aug 09, 2018 11:00 AM CDT   (Arrive by 10:45 AM)   Return Visit with Nigel Valentine MD   [facility] ([facility])    [address] [place] [address]  [address] [place] [address] [phone]            Aug 09, 2018 11:30 AM CDT   Infusion 60 with UC ONCOLOGY INFUSION, UC 11 ATC   [facility] ([facility])    [address] [place] [address]  [address] [place] [address] [phone]              Who to contact     If you have questions or need follow up information about today's clinic visit or your schedule please contact [facility] directly at [phone].  Normal or non-critical lab and imaging results will be communicated to you by MyChart, letter or phone within 4 business days after  "the clinic has received the results. If you do not hear from us within 7 days, please contact the clinic through [employer] or phone. If you have a critical or abnormal lab result, we will notify you by phone as soon as possible.  Submit refill requests through [employer] or call your pharmacy and they will forward the refill request to us. Please allow 3 business days for your refill to be completed.          Additional Information About Your Visit        [employer] Information     [employer] gives you secure access to your electronic health record. If you see a primary care provider, you can also send messages to your care team and make appointments. If you have questions, please call your primary care clinic.  If you do not have a primary care provider, please call [phone] and they will assist you.        Care EveryWhere ID     This is your Care EveryWhere ID. This could be used by other organizations to access your [place] medical records  [id number]        Your Vitals Were     Pulse Temperature Respirations Height Pulse Oximetry BMI (Body Mass Index)    74 97.6  F (36.4  C) (Oral) 16 1.676 m (5' 6\") 95% 32.09 kg/m2       Blood Pressure from Last 3 Encounters:   07/12/18 140/84   06/28/18 127/72   06/28/18 127/72    Weight from Last 3 Encounters:   07/12/18 90.2 kg (198 lb 12.8 oz)   06/28/18 91.6 kg (202 lb)   06/28/18 91.6 kg (201 lb 15.1 oz)              We Performed the Following     Comprehensive metabolic panel     T4 free     TSH with free T4 reflex        Primary Care Provider Office Phone # Fax #    Aide Robertson [phone]       [facility] [address] [address]        Equal Access to Services     [facility] Hospital[facility] AH: Hadii katelyn Cox, wamarcida luqadaha, qaybta kaalmada marco, silvia guzmán. So [facility] [phone].    ATENCIÓN: Si habla español, tiene a tim disposición servicios gratuitos de asistencia lingüística. Llame al " [phone].    We comply with applicable federal civil rights laws and [place] laws. We do not discriminate on the basis of race, color, national origin, age, disability, sex, sexual orientation, or gender identity.            Thank you!     Thank you for choosing [facility]  for your care. Our goal is always to provide you with excellent care. Hearing back from our patients is one way we can continue to improve our services. Please take a few minutes to complete the written survey that you may receive in the mail after your visit with us. Thank you!             Your Updated Medication List - Protect others around you: Learn how to safely use, store and throw away your medicines at www.disposemymeds.org.          This list is accurate as of 7/12/18 12:19 PM.  Always use your most recent med list.                   Brand Name Dispense Instructions for use Diagnosis    benzonatate 100 MG capsule    TESSALON    60 capsule    Take 1 capsule (100 mg) by mouth 2 times daily as needed for cough    Melanoma of anus (H)       calcium carbonate 500 MG tablet    OS-JANAY 500 mg [place]. Ca     Take 1 tablet by mouth every evening        * guaiFENesin 600 MG 12 hr tablet    MUCINEX     Take 1,200 mg by mouth 2 times daily        * MUCINEX 600 MG 12 hr tablet   Generic drug:  guaiFENesin           * LORazepam 0.5 MG tablet    ATIVAN    30 tablet    Take 1 tablet (0.5 mg) by mouth every 4 hours as needed (Anxiety, Nausea/Vomiting or Sleep)    Melanoma of anus (H)       * LORazepam 0.5 MG tablet    ATIVAN    20 tablet    Take 1 tablet (0.5 mg) by mouth as needed for anxiety (1 mg Ativan 1 hour prior. Take additional 0.5 mg 30 minutes prior if still feeling anxious.)    Anxiety       MAGNESIUM PO      Take by mouth At Bedtime        METAMUCIL PO      Take 1 packet by mouth        prochlorperazine 10 MG tablet    COMPAZINE    30 tablet    Take 1 tablet (10 mg) by mouth every 6 hours as needed (Nausea/Vomiting)     Melanoma of anus (H)       TYLENOL ARTHRITIS PAIN 650 MG CR tablet   Generic drug:  acetaminophen      Take 650 mg by mouth every 6 hours as needed for mild pain or fever        VITAMIN D (CHOLECALCIFEROL) PO      Take by mouth every evening        * Notice:  This list has 4 medication(s) that are the same as other medications prescribed for you. Read the directions carefully, and ask your doctor or other care provider to review them with you.

## 2018-07-26 NOTE — NURSING NOTE
Chief Complaint   Patient presents with     Blood Draw     labs drawn with PIV start by rn.  vs taken     Labs drawn with PIV start by rn.  Pt tolerated well.  VS taken and pt checked in for next appt.  Sandie Archer RN

## 2018-07-26 NOTE — PATIENT INSTRUCTIONS
Contact Numbers    [facility] [place]: [phone]  [facility] [place] and after hours / weekends / holidays:  [phone]      Please call the triage or after hours line if you experience a temperature greater than or equal to 100.5, shaking chills, have uncontrolled nausea, vomiting and/or diarrhea, dizziness, shortness of breath, chest pain, bleeding, unexplained bruising, or if you have any other new/concerning symptoms, questions or concerns.      If you are having any concerning symptoms or wish to speak to a provider before your next infusion visit, please call your care coordinator or triage to notify them so we can adequately serve you.     If you need a refill on a narcotic prescription or other medication, please call before your infusion appointment.             July 2018 Sunday Monday Tuesday Wednesday Thursday Friday Saturday   1     2     [facility] COLONOSCOPY    1:30 PM   (30 min.)   Ronald Wright MD   [facility] 3     4     5     6     7       8     9     10     11     12     [facility]   10:00 AM   (15 min.)    [facility] DRAW   [facility]     [facility] ONC INFUSION 60   11:30 AM   (60 min.)    ONCOLOGY INFUSION   [facility] 13     14       15     16     17     18     19     20     21       22     23     24     25     26     P [facility] LAB DRAW    8:00 AM   (15 min.)    [facility] DRAW   [facility]     [facility] ONC INFUSION 60    8:30 AM   (60 min.)    ONCOLOGY INFUSION   [facility] 27     28       29     30     31                                    August 2018 Sunday Monday Tuesday Wednesday Thursday Friday Saturday                  1     2     3     4       5     6     7     8     9     P [facility] DRAW    8:15 AM   (15 min.)    [facility] DRAW   [facility]     CT CHEST ABDOMEN PELVIS WWO    9:00 AM   (20 min.)   UCCT2   [facility] [place] [facility]     [facility] RETURN   10:45 AM   (30 min.)    Nigel Gauthier MD   [facility] 10     11       12     13     14     15     16     17     18       19     20     21     22     23     24     25       26     27     28     29     30     31                      Recent Results (from the past 24 hour(s))   Comprehensive metabolic panel    Collection Time: 07/26/18  8:07 AM   Result Value Ref Range    Sodium 139 133 - 144 mmol/L    Potassium 4.0 3.4 - 5.3 mmol/L    Chloride 108 94 - 109 mmol/L    Carbon Dioxide 23 20 - 32 mmol/L    Anion Gap 8 3 - 14 mmol/L    Glucose 96 70 - 99 mg/dL    Urea Nitrogen 20 7 - 30 mg/dL    Creatinine 0.74 0.52 - 1.04 mg/dL    GFR Estimate 80 >60 mL/min/1.7m2    GFR Estimate If Black >90 >60 mL/min/1.7m2    Calcium 8.6 8.5 - 10.1 mg/dL    Bilirubin Total 0.4 0.2 - 1.3 mg/dL    Albumin 3.6 3.4 - 5.0 g/dL    Protein Total 7.7 6.8 - 8.8 g/dL    Alkaline Phosphatase 108 40 - 150 U/L    ALT 24 0 - 50 U/L    AST 16 0 - 45 U/L   TSH with free T4 reflex    Collection Time: 07/26/18  8:07 AM   Result Value Ref Range    TSH 0.40 0.40 - 4.00 mU/L   CBC with platelets differential    Collection Time: 07/26/18  8:07 AM   Result Value Ref Range    WBC 5.8 4.0 - 11.0 10e9/L    RBC Count 5.42 (H) 3.8 - 5.2 10e12/L    Hemoglobin 15.5 11.7 - 15.7 g/dL    Hematocrit 47.5 (H) 35.0 - 47.0 %    MCV 88 78 - 100 fl    MCH 28.6 26.5 - 33.0 pg    MCHC 32.6 31.5 - 36.5 g/dL    RDW 13.0 10.0 - 15.0 %    Platelet Count 272 150 - 450 10e9/L    Diff Method Automated Method     % Neutrophils 57.3 %    % Lymphocytes 27.0 %    % Monocytes 5.9 %    % Eosinophils 8.8 %    % Basophils 0.7 %    % Immature Granulocytes 0.3 %    Nucleated RBCs 0 0 /100    Absolute Neutrophil 3.3 1.6 - 8.3 10e9/L    Absolute Lymphocytes 1.6 0.8 - 5.3 10e9/L    Absolute Monocytes 0.3 0.0 - 1.3 10e9/L    Absolute Eosinophils 0.5 0.0 - 0.7 10e9/L    Absolute Basophils 0.0 0.0 - 0.2 10e9/L    Abs Immature Granulocytes 0.0 0 - 0.4 10e9/L    Absolute Nucleated RBC 0.0

## 2018-07-26 NOTE — MR AVS SNAPSHOT
After Visit Summary   7/26/2018    Khloe Tamez    MRN: [id number]           Patient Information     Date Of Birth          1955        Visit Information        Provider Department      7/26/2018 8:30 AM  16 ATC;  ONCOLOGY INFUSION [facility]        Today's Diagnoses     Melanoma of anus (H)    -  1    Metastatic malignant melanoma (H)          Care Instructions    Contact Numbers    [facility] Main Line: [phone]  [facility] Triage and after hours / weekends / holidays:  [phone]      Please call the triage or after hours line if you experience a temperature greater than or equal to 100.5, shaking chills, have uncontrolled nausea, vomiting and/or diarrhea, dizziness, shortness of breath, chest pain, bleeding, unexplained bruising, or if you have any other new/concerning symptoms, questions or concerns.      If you are having any concerning symptoms or wish to speak to a provider before your next infusion visit, please call your care coordinator or triage to notify them so we can adequately serve you.     If you need a refill on a narcotic prescription or other medication, please call before your infusion appointment.             July 2018 Sunday Monday Tuesday Wednesday Thursday Friday Saturday   1     2     [facility] COLONOSCOPY    1:30 PM   (30 min.)   Ronald Wright MD   [facility] 3     4     5     6     7       8     9     10     11     12     [facility] DRAW   10:00 AM   (15 min.)    [facility] DRAW   [facility] Lab Draw     [facility] ONC INFUSION 60   11:30 AM   (60 min.)    ONCOLOGY INFUSION   [facility] 13     14       15     16     17     18     19     20     21       22     23     24     25     26     [facility] D[facility]    8:00 AM   (15 min.)    [facility] DRAW   [facility] Lab Draw     [facility] ONC INFUSION 60    8:30 AM   (60 min.)    ONCOLOGY INFUSION   [facility] 27     28       29      30 31 August 2018 Sunday Monday Tuesday Wednesday Thursday Friday Saturday                  1     2     3     4       5     6     7     8     9     [facility]    8:15 AM   (15 min.)    [facility] DRAW   [facility] Lab Draw     CT CHEST ABDOMEN PELVIS WWO    9:00 AM   (20 min.)   UCCT2   [facility] CT     [facility] RETURN   10:45 AM   (30 min.)   Nigel Gauthier MD   [facility] 10     11       12     13     14     15     16     17     18       19     20     21     22     23     24     25       26     27     28     29     30     31                      Recent Results (from the past 24 hour(s))   Comprehensive metabolic panel    Collection Time: 07/26/18  8:07 AM   Result Value Ref Range    Sodium 139 133 - 144 mmol/L    Potassium 4.0 3.4 - 5.3 mmol/L    Chloride 108 94 - 109 mmol/L    Carbon Dioxide 23 20 - 32 mmol/L    Anion Gap 8 3 - 14 mmol/L    Glucose 96 70 - 99 mg/dL    Urea Nitrogen 20 7 - 30 mg/dL    Creatinine 0.74 0.52 - 1.04 mg/dL    GFR Estimate 80 >60 mL/min/1.7m2    GFR Estimate If Black >90 >60 mL/min/1.7m2    Calcium 8.6 8.5 - 10.1 mg/dL    Bilirubin Total 0.4 0.2 - 1.3 mg/dL    Albumin 3.6 3.4 - 5.0 g/dL    Protein Total 7.7 6.8 - 8.8 g/dL    Alkaline Phosphatase 108 40 - 150 U/L    ALT 24 0 - 50 U/L    AST 16 0 - 45 U/L   TSH with free T4 reflex    Collection Time: 07/26/18  8:07 AM   Result Value Ref Range    TSH 0.40 0.40 - 4.00 mU/L   CBC with platelets differential    Collection Time: 07/26/18  8:07 AM   Result Value Ref Range    WBC 5.8 4.0 - 11.0 10e9/L    RBC Count 5.42 (H) 3.8 - 5.2 10e12/L    Hemoglobin 15.5 11.7 - 15.7 g/dL    Hematocrit 47.5 (H) 35.0 - 47.0 %    MCV 88 78 - 100 fl    MCH 28.6 26.5 - 33.0 pg    MCHC 32.6 31.5 - 36.5 g/dL    RDW 13.0 10.0 - 15.0 %    Platelet Count 272 150 - 450 10e9/L    Diff Method Automated Method     % Neutrophils 57.3 %    % Lymphocytes 27.0 %    % Monocytes  5.9 %    % Eosinophils 8.8 %    % Basophils 0.7 %    % Immature Granulocytes 0.3 %    Nucleated RBCs 0 0 /100    Absolute Neutrophil 3.3 1.6 - 8.3 10e9/L    Absolute Lymphocytes 1.6 0.8 - 5.3 10e9/L    Absolute Monocytes 0.3 0.0 - 1.3 10e9/L    Absolute Eosinophils 0.5 0.0 - 0.7 10e9/L    Absolute Basophils 0.0 0.0 - 0.2 10e9/L    Abs Immature Granulocytes 0.0 0 - 0.4 10e9/L    Absolute Nucleated RBC 0.0                  Follow-ups after your visit        Your next 10 appointments already scheduled     Aug 09, 2018  8:15 AM CDT   [facility] with  [facility]RAW   [facility] Draw ([facility])    [address]  [address]  [facility] [id number] [phone]            Aug 09, 2018  9:00 AM CDT   CT CHEST ABDOMEN PELVIS W/O & W CONTRAST with UCCT2   [facility] CT ([facility] [place])    [address]  1st Floor  [facility] [id number] [phone]           Please bring any scans or X-rays taken at other hospitals, if similar tests were done. Also bring a list of your medicines, including vitamins, minerals and over-the-counter drugs. It is safest to leave personal items at home.  Be sure to tell your doctor:   If you have any allergies.   If there s any chance you are pregnant.   If you are breastfeeding.  How to prepare:   Do not eat or drink for 2 hours before your exam. If you need to take medicine, you may take it with small sips of water. (We may ask you to take liquid medicine as well.)   Please wear loose clothing, such as a sweat suit or jogging clothes. Avoid snaps, zippers and other metal. We may ask you to undress and put on a hospital gown.  Please arrive 30 minutes early for your CT. Once in the department you might be asked to drink water 15-20 minutes prior to your exam.  If indicated you may be asked to drink an oral contrast in advance of your CT.  If this is the case, the imaging team will let you know  or be in contact with you prior to your appointment  Patients over 70 or patients with diabetes or kidney problems:   If you haven t had a blood test (creatinine test) within the last 30 days, the Cardiologist/Radiologist may require you to get this test prior to your exam.  If you have diabetes:   Continue to take your metformin medication on the day of your exam  If you have any questions, please call the Imaging Department where you will have your exam.            Aug 09, 2018 11:00 AM CDT   (Arrive by 10:45 AM)   Return Visit with Nigel Valentine MD   [facility] ([facility])    [address]  [address]  [facility] [address] [phone]              Who to contact     If you have questions or need follow up information about today's clinic visit or your schedule please contact [facility] directly at [phone].  Normal or non-critical lab and imaging results will be communicated to you by MyChart, letter or phone within 4 business days after the clinic has received the results. If you do not hear from us within 7 days, please contact the clinic through [employer] or phone. If you have a critical or abnormal lab result, we will notify you by phone as soon as possible.  Submit refill requests through [employer] or call your pharmacy and they will forward the refill request to us. Please allow 3 business days for your refill to be completed.          Additional Information About Your Visit        [employer] Information     [employer] gives you secure access to your electronic health record. If you see a primary care provider, you can also send messages to your care team and make appointments. If you have questions, please call your primary care clinic.  If you do not have a primary care provider, please call [phone] and they will assist you.        Care EveryWhere ID     This is your Care EveryWhere ID. This could be used by other  organizations to access your [place] medical records  [id number]        Your Vitals Were     Pulse Temperature Respirations Pulse Oximetry BMI (Body Mass Index)       68 97.9  F (36.6  C) (Oral) 16 97% 32.64 kg/m2        Blood Pressure from Last 3 Encounters:   07/26/18 147/79   07/12/18 140/84   06/28/18 127/72    Weight from Last 3 Encounters:   07/26/18 91.7 kg (202 lb 3.2 oz)   07/12/18 90.2 kg (198 lb 12.8 oz)   06/28/18 91.6 kg (202 lb)              We Performed the Following     CBC with platelets differential     Comprehensive metabolic panel     TSH with free T4 reflex        Primary Care Provider Office Phone # Fax #    Aide Robertson [phone]       [facility] [address] [address]        Equal Access to Services     LESA WALKER : Hadii katelyn hintono Soraisa, waaxda luqadaha, qaybta kaalmada adeclaudioyaem, silvia fierro . So [facility] [phone].    ATENCIÓN: Si habla español, tiene a tim disposición servicios gratuitos de asistencia lingüística. Llame al [phone].    We comply with applicable federal civil rights laws and [place] laws. We do not discriminate on the basis of race, color, national origin, age, disability, sex, sexual orientation, or gender identity.            Thank you!     Thank you for choosing [facility]  for your care. Our goal is always to provide you with excellent care. Hearing back from our patients is one way we can continue to improve our services. Please take a few minutes to complete the written survey that you may receive in the mail after your visit with us. Thank you!             Your Updated Medication List - Protect others around you: Learn how to safely use, store and throw away your medicines at www.disposemymeds.org.          This list is accurate as of 7/26/18  9:18 AM.  Always use your most recent med list.                   Brand Name Dispense Instructions for use  Diagnosis    benzonatate 100 MG capsule    TESSALON    60 capsule    Take 1 capsule (100 mg) by mouth 2 times daily as needed for cough    Melanoma of anus (H)       calcium carbonate 500 MG tablet    OS-JANAY 500 mg [place]. Ca     Take 1 tablet by mouth every evening        * guaiFENesin 600 MG 12 hr tablet    MUCINEX     Take 1,200 mg by mouth 2 times daily        * MUCINEX 600 MG 12 hr tablet   Generic drug:  guaiFENesin           * LORazepam 0.5 MG tablet    ATIVAN    30 tablet    Take 1 tablet (0.5 mg) by mouth every 4 hours as needed (Anxiety, Nausea/Vomiting or Sleep)    Melanoma of anus (H)       * LORazepam 0.5 MG tablet    ATIVAN    20 tablet    Take 1 tablet (0.5 mg) by mouth as needed for anxiety (1 mg Ativan 1 hour prior. Take additional 0.5 mg 30 minutes prior if still feeling anxious.)    Anxiety       MAGNESIUM PO      Take by mouth At Bedtime        METAMUCIL PO      Take 1 packet by mouth        prochlorperazine 10 MG tablet    COMPAZINE    30 tablet    Take 1 tablet (10 mg) by mouth every 6 hours as needed (Nausea/Vomiting)    Melanoma of anus (H)       TYLENOL ARTHRITIS PAIN 650 MG CR tablet   Generic drug:  acetaminophen      Take 650 mg by mouth every 6 hours as needed for mild pain or fever        VITAMIN D (CHOLECALCIFEROL) PO      Take by mouth every evening        * Notice:  This list has 4 medication(s) that are the same as other medications prescribed for you. Read the directions carefully, and ask your doctor or other care provider to review them with you.

## 2018-07-26 NOTE — PROGRESS NOTES
Infusion Nursing Note:  Khloe Tamez presents today for cycle 3, day 1 nivolumab.    Patient seen by provider today: No   present during visit today: Not Applicable.    Note: Patient's nivolumab was switched to monthly dosing today.  She continues to have a lingering cough which she says is unchanged, occasionally productive of white sputum. She denies any sob and is taking tessalon and robitussin as ordered.  Her TSH was improved today.  She does report some trouble sleeping at night but is taking ativan prn with relief.      Intravenous Access:  Peripheral IV placed in lab    Treatment Conditions:  Lab Results   Component Value Date    HGB 15.5 07/26/2018     Lab Results   Component Value Date    WBC 5.8 07/26/2018      Lab Results   Component Value Date    ANEU 3.3 07/26/2018     Lab Results   Component Value Date     07/26/2018      Lab Results   Component Value Date     07/26/2018                   Lab Results   Component Value Date    POTASSIUM 4.0 07/26/2018           No results found for: MAG         Lab Results   Component Value Date    CR 0.74 07/26/2018                   Lab Results   Component Value Date    JANAY 8.6 07/26/2018                Lab Results   Component Value Date    BILITOTAL 0.4 07/26/2018           Lab Results   Component Value Date    ALBUMIN 3.6 07/26/2018                    Lab Results   Component Value Date    ALT 24 07/26/2018           Lab Results   Component Value Date    AST 16 07/26/2018       Results reviewed, labs MET treatment parameters, ok to proceed with treatment.      Post Infusion Assessment:  Patient tolerated infusion without incident.  Blood return noted pre and post infusion.  Site patent and intact, free from redness, edema or discomfort.  No evidence of extravasations.  Access discontinued per protocol.    Discharge Plan:   Prescription refills given for ativan.  Discharge instructions reviewed with: Patient.  Patient and/or family  verbalized understanding of discharge instructions and all questions answered.  Copy of AVS reviewed with patient and/or family.  Patient will return 8/9 for next appointment with Dr Valentine.  Infusion appointment on 8/9 cancelled as patient is not due for nivolumab again until 8/23.    Patient discharged in stable condition accompanied by: self anddaughter  Departure Mode: Ambulatory.  Face to Face time: 0.    Edith Collazo RN

## 2018-08-09 NOTE — PROGRESS NOTES
[facility]  MEDICAL ONCOLOGY PROGRESS NOTE  Aug 9, 2018    CHIEF COMPLAINT: Perianal melanoma    Melanoma History:.  1. 3/27/18 patient had perianal lesion biopsied in [place] ([id number]). This showed malignant melanoma, positive for HMB45 and S100, and negative for CD45 and pancytokeratin. Breslow depth at least 3.5 mm, non-ulcerated, with 5 mitoses/mm2, TILs present with deep and peripheral margins positive; pT3a.  2. 3/29/18, PET-CT showed a perianal FDG avid lesion (SUV max 10.8) and a superficial left inguinal lymph node measuring 0.9 cm (SUV max 4.1).  3. 4/13/18, she has wide local excision of a 2 x 2.5 cm perianal melanoma with anaplastic closure, under Dr. Wright. Pathology showed a nodular melanoma, Breslow depth 6.0 mm to mark's level IV, there were more than 10 mitoses per mm2, with ulceration, and lymphovascular invasion present. No perineural invasion or microsatellitosis. Tumor infiltrating lymphocytes present, non-brisk. AJCC microstage: pT4b.  4. 4/26/18, she has MRI brain, which is negative for metastatic disease.  5. 5/2/18, she returns to OR under Dr. Shah for left femoral sentinel lymph node biopsy, with 2 (of 3) involved lymph nodes. IHC positive for Melan-A, tyrosinase, and HMB-45. No definite extranodal extension.  6  5/31/18, she begins treatment with adjuvant nivolumab  7. 6/11/18, [employer] testing shows PD-L1 0% by IHC, with microsatellite stability (MARY LOU) and tumor mutational burden low at 4 per Mb DNA. Mutations seen in STK11 V4fs*160, BRAF D594G, SF3B1 R625H and KIT amplification.      HISTORY OF PRESENT ILLNESS  Khloe Tamez is a 62 year old female from [place], [place] with resected perianal melanoma. She presents today in follow-up on adjuvant nivolumab. She has now received 4 cycles of therapy.    CT-CAP today for restaging shows no convincing evidence for metastatic disease in the abdomen or pelvis, and there are stable pulmonary nodules.    She denies  cough, shortness of breath, nausea, vomiting, or abdominal pain. She tolerates immunotherapy well. She has had no fevers or chills. No new palpable lumps or bumps. No new neurologic complaints. KPS 90.    REVIEW OF SYSTEMS  A 12-point ROS negative except as in HPI.    Past Medical History:   Diagnosis Date     Melanoma malignant, perianal skin (H)      Obesity (BMI 30.0-34.9)      Osteoarthritis      Past Surgical History:   Procedure Laterality Date     BIOPSY NODE SENTINEL Left 2018    Procedure: BIOPSY NODE SENTINEL;  [place] Lymph Node Biopsy x3, Lymphatic mapping;  Surgeon: Adams Shah MD;  [facility]      SECTION       COLONOSCOPY       EXAM UNDER ANESTHESIA ANUS N/A 2018    Procedure: EXAM UNDER ANESTHESIA ANUS;  Examination Under Anesthesia Anus, Local Excision of Perianal Melanoma;  Surgeon: Ronald Wright MD;  [facility] OR     EXCISE LESION RECTUM N/A 2018    Procedure: EXCISE LESION RECTUM;;  Surgeon: Ronald Wright MD;  [facility] OR       SOCIAL HISTORY  She has worked as a caregiver, currently works at assisted living facility. Lifelong nonsmoker. No significant alcohol. She has twins.      FAMILY HISTORY  Father had a skin cancer, unsure if melanoma. Paternal aunt had stomach cancer.    PHYSICAL EXAMINATION  /86 (BP Location: Right arm, Patient Position: Sitting)  Pulse 70  Temp 98.4  F (36.9  C) (Oral)  Wt 91.4 kg (201 lb 6.4 oz)  SpO2 96%  BMI 32.51 kg/m2    Physical Exam   Constitutional: She is oriented to person, place, and time. She appears well-developed and well-nourished.   HENT:   Mouth/Throat: Oropharynx is clear and moist.   Eyes: EOM are normal. Pupils are equal, round, and reactive to light. No scleral icterus.   Neck: Normal range of motion. Neck supple.   Cardiovascular: Regular rhythm.    Pulmonary/Chest: Effort normal and breath sounds normal. She has no wheezes.   Abdominal: Soft. She exhibits no mass.   Musculoskeletal: Normal range  of motion. She exhibits no tenderness.   Neurological: She is alert and oriented to person, place, and time. No cranial nerve deficit.   Skin: Skin is warm and dry. No rash noted. No pallor.   Psychiatric: She has a normal mood and affect.   Nursing note and vitals reviewed.      ASSESSMENT AND PLAN  #1 Resected Stage IIIC nodular melanoma, pT4b pN2b, perianal primary  It was a pleasure to see Ms. Tamez today. She is a 62 year old woman with resected perianal melanoma. She is on adjuvant nivolumab. Continue on adjuvant nivolumab every 4 weeks. I will plan to see her back in 3 months with repeat CT-CAP and labs.    Multiple questions answered.    Nigel Person M.D.   of Medicine  Hematology, Oncology and Transplantation

## 2018-08-09 NOTE — LETTER
8/9/2018      RE: Khloe Tamez  [address] [address]       [facility]  MEDICAL ONCOLOGY PROGRESS NOTE  Aug 9, 2018    CHIEF COMPLAINT: Perianal melanoma    Melanoma History:.  1. 3/27/18 patient had perianal lesion biopsied in [place] ([id number]). This showed malignant melanoma, positive for HMB45 and S100, and negative for CD45 and pancytokeratin. Breslow depth at least 3.5 mm, non-ulcerated, with 5 mitoses/mm2, TILs present with deep and peripheral margins positive; pT3a.  2. 3/29/18, PET-CT showed a perianal FDG avid lesion (SUV max 10.8) and a superficial left inguinal lymph node measuring 0.9 cm (SUV max 4.1).  3. 4/13/18, she has wide local excision of a 2 x 2.5 cm perianal melanoma with anaplastic closure, under Dr. Wright. Pathology showed a nodular melanoma, Breslow depth 6.0 mm to mark's level IV, there were more than 10 mitoses per mm2, with ulceration, and lymphovascular invasion present. No perineural invasion or microsatellitosis. Tumor infiltrating lymphocytes present, non-brisk. AJCC microstage: pT4b.  4. 4/26/18, she has MRI brain, which is negative for metastatic disease.  5. 5/2/18, she returns to OR under Dr. Shah for left femoral sentinel lymph node biopsy, with 2 (of 3) involved lymph nodes. IHC positive for Melan-A, tyrosinase, and HMB-45. No definite extranodal extension.  6  5/31/18, she begins treatment with adjuvant nivolumab  7. 6/11/18, [employer] testing shows PD-L1 0% by IHC, with microsatellite stability (MARY LOU) and tumor mutational burden low at 4 per Mb DNA. Mutations seen in STK11 V4fs*160, BRAF D594G, SF3B1 R625H and KIT amplification.      HISTORY OF PRESENT ILLNESS  Khloe Tamez is a 62 year old female from [place], [place] with resected perianal melanoma. She presents today in follow-up on adjuvant nivolumab. She has now received 4 cycles of therapy.    CT-CAP today for restaging shows no convincing evidence for metastatic  disease in the abdomen or pelvis, and there are stable pulmonary nodules.    She denies cough, shortness of breath, nausea, vomiting, or abdominal pain. She tolerates immunotherapy well. She has had no fevers or chills. No new palpable lumps or bumps. No new neurologic complaints. KPS 90.    REVIEW OF SYSTEMS  A 12-point ROS negative except as in HPI.    Past Medical History:   Diagnosis Date     Melanoma malignant, perianal skin (H)      Obesity (BMI 30.0-34.9)      Osteoarthritis      Past Surgical History:   Procedure Laterality Date     BIOPSY NODE SENTINEL Left 2018    Procedure: BIOPSY NODE SENTINEL;  [place] Lymph Node Biopsy x3, Lymphatic mapping;  Surgeon: Adams Shah MD;  [facility]      SECTION       COLONOSCOPY       EXAM UNDER ANESTHESIA ANUS N/A 2018    Procedure: EXAM UNDER ANESTHESIA ANUS;  Examination Under Anesthesia Anus, Local Excision of Perianal Melanoma;  Surgeon: Ronald Wright MD;  [facility]     EXCISE LESION RECTUM N/A 2018    Procedure: EXCISE LESION RECTUM;;  Surgeon: Ronald Wright MD;  [facility]       SOCIAL HISTORY  She has worked as a caregiver, currently works at assisted living facility. Lifelong nonsmoker. No significant alcohol. She has twins.      FAMILY HISTORY  Father had a skin cancer, unsure if melanoma. Paternal aunt had stomach cancer.    PHYSICAL EXAMINATION  /86 (BP Location: Right arm, Patient Position: Sitting)  Pulse 70  Temp 98.4  F (36.9  C) (Oral)  Wt 91.4 kg (201 lb 6.4 oz)  SpO2 96%  BMI 32.51 kg/m2    Physical Exam   Constitutional: She is oriented to person, place, and time. She appears well-developed and well-nourished.   HENT:   Mouth/Throat: Oropharynx is clear and moist.   Eyes: EOM are normal. Pupils are equal, round, and reactive to light. No scleral icterus.   Neck: Normal range of motion. Neck supple.   Cardiovascular: Regular rhythm.    Pulmonary/Chest: Effort normal and breath sounds normal. She  has no wheezes.   Abdominal: Soft. She exhibits no mass.   Musculoskeletal: Normal range of motion. She exhibits no tenderness.   Neurological: She is alert and oriented to person, place, and time. No cranial nerve deficit.   Skin: Skin is warm and dry. No rash noted. No pallor.   Psychiatric: She has a normal mood and affect.   Nursing note and vitals reviewed.      ASSESSMENT AND PLAN  #1 Resected Stage IIIC nodular melanoma, pT4b pN2b, perianal primary  It was a pleasure to see Ms. Tamez today. She is a 62 year old woman with resected perianal melanoma. She is on adjuvant nivolumab. Continue on adjuvant nivolumab every 4 weeks. I will plan to see her back in 3 months with repeat CT-CAP and labs.    Multiple questions answered.    Nigel Person M.D.   of Medicine  Hematology, Oncology and Transplantation    Nigel Valentine MD

## 2018-08-09 NOTE — DISCHARGE INSTRUCTIONS

## 2018-08-09 NOTE — LETTER
8/9/2018       RE: Khloe Tamez  [address] [facility] [address]     Dear Colleague,    Thank you for referring your patient, Khloe Tamez, to the [facility]. Please see a copy of my visit note below.    [facility]  MEDICAL ONCOLOGY PROGRESS NOTE  Aug 9, 2018    CHIEF COMPLAINT: Perianal melanoma    Melanoma History:.  1. 3/27/18 patient had perianal lesion biopsied in [place] ([id number]). This showed malignant melanoma, positive for HMB45 and S100, and negative for CD45 and pancytokeratin. Breslow depth at least 3.5 mm, non-ulcerated, with 5 mitoses/mm2, TILs present with deep and peripheral margins positive; pT3a.  2. 3/29/18, PET-CT showed a perianal FDG avid lesion (SUV max 10.8) and a superficial left inguinal lymph node measuring 0.9 cm (SUV max 4.1).  3. 4/13/18, she has wide local excision of a 2 x 2.5 cm perianal melanoma with anaplastic closure, under Dr. Wright. Pathology showed a nodular melanoma, Breslow depth 6.0 mm to mark's level IV, there were more than 10 mitoses per mm2, with ulceration, and lymphovascular invasion present. No perineural invasion or microsatellitosis. Tumor infiltrating lymphocytes present, non-brisk. AJCC microstage: pT4b.  4. 4/26/18, she has MRI brain, which is negative for metastatic disease.  5. 5/2/18, she returns to OR under Dr. Shah for left femoral sentinel lymph node biopsy, with 2 (of 3) involved lymph nodes. IHC positive for Melan-A, tyrosinase, and HMB-45. No definite extranodal extension.  6  5/31/18, she begins treatment with adjuvant nivolumab  7. 6/11/18, [facility] testing shows PD-L1 0% by IHC, with microsatellite stability (MARY LOU) and tumor mutational burden low at 4 per Mb DNA. Mutations seen in STK11 V4fs*160, BRAF D594G, SF3B1 R625H and KIT amplification.      HISTORY OF PRESENT ILLNESS  Khloe Tamez is a 62 year old female from [place], [place] with resected perianal melanoma. She presents today  in follow-up on adjuvant nivolumab. She has now received 4 cycles of therapy.    CT-CAP today for restaging shows no convincing evidence for metastatic disease in the abdomen or pelvis, and there are stable pulmonary nodules.    She denies cough, shortness of breath, nausea, vomiting, or abdominal pain. She tolerates immunotherapy well. She has had no fevers or chills. No new palpable lumps or bumps. No new neurologic complaints. KPS 90.    REVIEW OF SYSTEMS  A 12-point ROS negative except as in HPI.    Past Medical History:   Diagnosis Date     Melanoma malignant, perianal skin (H)      Obesity (BMI 30.0-34.9)      Osteoarthritis      Past Surgical History:   Procedure Laterality Date     BIOPSY NODE SENTINEL Left 2018    Procedure: BIOPSY NODE SENTINEL;  [place] Lymph Node Biopsy x3, Lymphatic mapping;  Surgeon: Adams Shah MD;  [facility]      SECTION       COLONOSCOPY       EXAM UNDER ANESTHESIA ANUS N/A 2018    Procedure: EXAM UNDER ANESTHESIA ANUS;  Examination Under Anesthesia Anus, Local Excision of Perianal Melanoma;  Surgeon: Ronald Wright MD;  [facility]     EXCISE LESION RECTUM N/A 2018    Procedure: EXCISE LESION RECTUM;;  Surgeon: Ronald Wright MD;  [facility]       SOCIAL HISTORY  She has worked as a caregiver, currently works at assisted living facility. Lifelong nonsmoker. No significant alcohol. She has twins.      FAMILY HISTORY  Father had a skin cancer, unsure if melanoma. Paternal aunt had stomach cancer.    PHYSICAL EXAMINATION  /86 (BP Location: Right arm, Patient Position: Sitting)  Pulse 70  Temp 98.4  F (36.9  C) (Oral)  Wt 91.4 kg (201 lb 6.4 oz)  SpO2 96%  BMI 32.51 kg/m2    Physical Exam   Constitutional: She is oriented to person, place, and time. She appears well-developed and well-nourished.   HENT:   Mouth/Throat: Oropharynx is clear and moist.   Eyes: EOM are normal. Pupils are equal, round, and reactive to light. No scleral  icterus.   Neck: Normal range of motion. Neck supple.   Cardiovascular: Regular rhythm.    Pulmonary/Chest: Effort normal and breath sounds normal. She has no wheezes.   Abdominal: Soft. She exhibits no mass.   Musculoskeletal: Normal range of motion. She exhibits no tenderness.   Neurological: She is alert and oriented to person, place, and time. No cranial nerve deficit.   Skin: Skin is warm and dry. No rash noted. No pallor.   Psychiatric: She has a normal mood and affect.   Nursing note and vitals reviewed.      ASSESSMENT AND PLAN  #1 Resected Stage IIIC nodular melanoma, pT4b pN2b, perianal primary  It was a pleasure to see Ms. Tamez today. She is a 62 year old woman with resected perianal melanoma. She is on adjuvant nivolumab. Continue on adjuvant nivolumab every 4 weeks. I will plan to see her back in 3 months with repeat CT-CAP and labs.    Multiple questions answered.    Nigel Person M.D.   of Medicine  Hematology, Oncology and Transplantation    Again, thank you for allowing me to participate in the care of your patient.      Sincerely,    Nigel Valentine MD

## 2018-08-09 NOTE — NURSING NOTE
"Oncology Rooming Note    August 9, 2018 10:38 AM   Khloe Tamez is a 62 year old female who presents for:    Chief Complaint   Patient presents with     Blood Draw     Venipuncture labs collected by RN.      Oncology Clinic Visit     Return Melanoma of Anus     Initial Vitals: /86 (BP Location: Right arm, Patient Position: Sitting)  Pulse 70  Temp 98.4  F (36.9  C) (Oral)  Wt 91.4 kg (201 lb 6.4 oz)  SpO2 96%  BMI 32.51 kg/m2 Estimated body mass index is 32.51 kg/(m^2) as calculated from the following:    Height as of 7/12/18: 1.676 m (5' 6\").    Weight as of this encounter: 91.4 kg (201 lb 6.4 oz). Body surface area is 2.06 meters squared.  No Pain (0) Comment: Data Unavailable   No LMP recorded. Patient is postmenopausal.  Allergies reviewed: Yes  Medications reviewed: Yes    Medications: Medication refills not needed today.  Pharmacy name entered into [facility]:    [facility] [address] [place] [address]    Clinical concerns: Patient would like to discuss foundation report. Dr Valentine was notified.    8 minutes for nursing intake (face to face time)     Candi Rene (Student MA)                "

## 2018-08-09 NOTE — MR AVS SNAPSHOT
After Visit Summary   8/9/2018    Khloe Tamez    MRN: [id number]           Patient Information     Date Of Birth          1955        Visit Information        Provider Department      8/9/2018 11:00 AM Nigel Gauthier MD [facility]        Today's Diagnoses     Hyperthyroidism    -  1    Metastatic malignant melanoma (H)        Melanoma of anus (H)           Follow-ups after your visit        Follow-up notes from your care team     Return in about 3 months (around 11/9/2018).      Your next 10 appointments already scheduled     Aug 23, 2018  1:30 PM CDT   [facility] Lab Draw with [facility] LAB DRAW   [facility] Lab Draw ([facility])    [address]  [address]  [facility] [address]   [phone]            Aug 23, 2018  2:00 PM CDT   Infusion 62 with [facility] ONCOLOGY INFUSION, [facility] 11 ATC   [facility] ([facility])    [address] [place] [address]  [address]  [facility] [address]   [phone]            Sep 20, 2018  9:45 AM CDT   [facility] Lab Draw with [facility] LAB DRAW   [facility] Lab Draw ([facility])    [address]  [address]  [facility] [address]   [phone]            Sep 20, 2018 10:30 AM CDT   Infusion 62 with [facility] ONCOLOGY INFUSION, UC 31 ATC   [facility] ([facility])    [address]  [address]  [facility] [address]   [phone]            Oct 18, 2018 10:30 AM CDT   [facility] Lab Draw with [facility] LAB DRAW   [facility] Lab Draw ([facility])    [address]  [address]  [facility] [address]   [phone]            Oct 18, 2018 11:00 AM CDT   Infusion 62 with [facility] ONCOLOGY INFUSION, UC 29 ATC   [facility] ([facility])    [address] [place] [address] Se  [address]  [facility] [address]    [phone]            Nov 05, 2018  8:00 AM CST   CT CHEST ABDOMEN PELVIS W/O & W CONTRAST with UCCT1   [facility] [place] [facility] ([facility])    [address] [address] [phone]           Please bring any scans or X-rays taken at other hospitals, if similar tests were done. Also bring a list of your medicines, including vitamins, minerals and over-the-counter drugs. It is safest to leave personal items at home.  Be sure to tell your doctor:   If you have any allergies.   If there s any chance you are pregnant.   If you are breastfeeding.  How to prepare:   Do not eat or drink for 2 hours before your exam. If you need to take medicine, you may take it with small sips of water. (We may ask you to take liquid medicine as well.)   Please wear loose clothing, such as a sweat suit or jogging clothes. Avoid snaps, zippers and other metal. We may ask you to undress and put on a hospital gown.  Please arrive 30 minutes early for your CT. Once in the department you might be asked to drink water 15-20 minutes prior to your exam.  If indicated you may be asked to drink an oral contrast in advance of your CT.  If this is the case, the imaging team will let you know or be in contact with you prior to your appointment  Patients over 70 or patients with diabetes or kidney problems:   If you haven t had a blood test (creatinine test) within the last 30 days, the Cardiologist/Radiologist may require you to get this test prior to your exam.  If you have diabetes:   Continue to take your metformin medication on the day of your exam  If you have any questions, please call the Imaging Department where you will have your exam.              Who to contact     If you have questions or need follow up information about today's clinic visit or your schedule please contact [facility] directly at [phone].  Normal or non-critical lab and imaging  results will be communicated to you by StepOnehart, letter or phone within 4 business days after the clinic has received the results. If you do not hear from us within 7 days, please contact the clinic through Rabbit TV or phone. If you have a critical or abnormal lab result, we will notify you by phone as soon as possible.  Submit refill requests through Rabbit TV or call your pharmacy and they will forward the refill request to us. Please allow 3 business days for your refill to be completed.          Additional Information About Your Visit        Rabbit TV Information     Rabbit TV gives you secure access to your electronic health record. If you see a primary care provider, you can also send messages to your care team and make appointments. If you have questions, please call your primary care clinic.  If you do not have a primary care provider, please call [phone] and they will assist you.        Care EveryWhere ID     This is your Care EveryWhere ID. This could be used by other organizations to access your [facility] medical records  [id number]        Your Vitals Were     Pulse Temperature Pulse Oximetry BMI (Body Mass Index)          70 98.4  F (36.9  C) (Oral) 96% 32.51 kg/m2         Blood Pressure from Last 3 Encounters:   08/09/18 140/86   07/26/18 147/79   07/12/18 140/84    Weight from Last 3 Encounters:   08/09/18 91.4 kg (201 lb 6.4 oz)   07/26/18 91.7 kg (202 lb 3.2 oz)   07/12/18 90.2 kg (198 lb 12.8 oz)              We Performed the Following     CBC with platelets differential     Comprehensive metabolic panel     T4 free     TSH with free T4 reflex          Today's Medication Changes          These changes are accurate as of 8/9/18 11:59 PM.  If you have any questions, ask your nurse or doctor.               These medicines have changed or have updated prescriptions.        Dose/Directions    guaiFENesin 600 MG 12 hr tablet   Commonly known as:  MUCINEX   This may have changed:  Another medication with the  same name was removed. Continue taking this medication, and follow the directions you see here.   Changed by:  Nigel Gauthier MD        Dose:  1200 mg   Take 1,200 mg by mouth 2 times daily   Refills:  0                Primary Care Provider Office Phone # Fax #    Aide Robertson [phone]       [facility] [address] [address]        Equal Access to Services     [facility] AARON : Hadii aad ku hadasho Soomaali, waaxda luqadaha, qaybta kaalmada adeegyada, waxay idiin hayaan manuela caarash vadim . So [facility] [phone].    ATENCIÓN: Si habla español, tiene a tim disposición servicios gratuitos de asistencia lingüística. Llame al [phone].    We comply with applicable federal civil rights laws and [place] laws. We do not discriminate on the basis of race, color, national origin, age, disability, sex, sexual orientation, or gender identity.            Thank you!     Thank you for choosing [facility]  for your care. Our goal is always to provide you with excellent care. Hearing back from our patients is one way we can continue to improve our services. Please take a few minutes to complete the written survey that you may receive in the mail after your visit with us. Thank you!             Your Updated Medication List - Protect others around you: Learn how to safely use, store and throw away your medicines at www.disposemymeds.org.          This list is accurate as of 8/9/18 11:59 PM.  Always use your most recent med list.                   Brand Name Dispense Instructions for use Diagnosis    benzonatate 100 MG capsule    TESSALON    60 capsule    Take 1 capsule (100 mg) by mouth 2 times daily as needed for cough    Melanoma of anus (H)       calcium carbonate 500 MG tablet    OS-JANAY 500 mg [place]. Ca     Take 1 tablet by mouth every evening        guaiFENesin 600 MG 12 hr tablet    MUCINEX     Take 1,200 mg by mouth 2 times daily         LORazepam 0.5 MG tablet    ATIVAN    30 tablet    Take 1 tablet (0.5 mg) by mouth every 4 hours as needed (Anxiety, Nausea/Vomiting or Sleep)    Melanoma of anus (H)       MAGNESIUM PO      Take by mouth At Bedtime        METAMUCIL PO      Take 1 packet by mouth        prochlorperazine 10 MG tablet    COMPAZINE    30 tablet    Take 1 tablet (10 mg) by mouth every 6 hours as needed (Nausea/Vomiting)    Melanoma of anus (H)       TYLENOL ARTHRITIS PAIN 650 MG CR tablet   Generic drug:  acetaminophen      Take 650 mg by mouth every 6 hours as needed for mild pain or fever        TYLENOL PM EXTRA STRENGTH  MG tablet   Generic drug:  diphenhydrAMINE-acetaminophen      500 mg nightly as needed        VITAMIN D (CHOLECALCIFEROL) PO      Take by mouth every evening

## 2018-08-15 NOTE — TELEPHONE ENCOUNTER
Pt called in to triage reporting she got stung by a bee 3 hours ago and immediately felt lightheaded with a racing heart beat. She denied any sob, cough, chest pain or tightness, redness, swelling, hives or warmth at the site. She took 1 dose of benadryl and has been laying down for the past 3 hours but do not feel symptoms are getting better. Asking if she took more benadryl or  Synthroid Dr. Valentine ordered last week, whether that'd help with symptoms. Advised pt these medications would not help with racing or irregular heart beat. Recommend she go to  for assessment and have someone drive her. Pt verbalized understanding and will have someone take her to , advised going directly to ED if any respiratory issues or weakness occurs.

## 2018-08-23 NOTE — PROGRESS NOTES
Infusion Nursing Note:  Khloe Tamez presents today for C4D1 Opdivo.    Patient seen by provider today: No   present during visit today: Not Applicable.    Note: Patient feels well. Endorses long standing cough, non productive same as previous. Denies SOB nor chest discomfort and palpitations. Denies fever/chills nor any signs of infection. No concerns made. Otherwise well.     Intravenous Access:  Peripheral IV placed.    Treatment Conditions:  Lab Results   Component Value Date    HGB 15.0 08/23/2018     Lab Results   Component Value Date    WBC 7.7 08/23/2018      Lab Results   Component Value Date    ANEU 5.4 08/23/2018     Lab Results   Component Value Date     08/23/2018      Lab Results   Component Value Date     08/23/2018                   Lab Results   Component Value Date    POTASSIUM 3.8 08/23/2018           No results found for: MAG         Lab Results   Component Value Date    CR 1.01 08/23/2018                   Lab Results   Component Value Date    JANAY 8.6 08/23/2018                Lab Results   Component Value Date    BILITOTAL 0.3 08/23/2018           Lab Results   Component Value Date    ALBUMIN 3.5 08/23/2018                    Lab Results   Component Value Date    ALT 29 08/23/2018           Lab Results   Component Value Date    AST 19 08/23/2018       Results reviewed, labs MET treatment parameters, ok to proceed with treatment.    TORB: 8/23/18/1540H/Misha KATE/Lorraine Andrews RN/ T4 0.24, .94, Will start on Synthroid.     Post Infusion Assessment:  Patient tolerated infusion without incident.  Blood return noted pre and post infusion.  Site patent and intact, free from redness, edema or discomfort.  No evidence of extravasations.  Access discontinued per protocol.    Discharge Plan:   Patient declined prescription refills.  Discharge instructions reviewed with: Patient and Family.  Patient and/or family verbalized understanding of discharge  instructions and all questions answered.  AVS to patient via [address].  Patient will return 9/20/18 for next appointment.   Patient discharged in stable condition accompanied by: self and .  Departure Mode: Ambulatory.    KATIANA HARPER RN

## 2018-08-23 NOTE — NURSING NOTE
Chief Complaint   Patient presents with     Blood Draw     pt had vitals done by MA and IV placed by CC     Oralia Lock MA

## 2018-08-23 NOTE — PATIENT INSTRUCTIONS
Contact Numbers  [facility]: [phone]    After Hours:  [phone]  Triage: [phone]    Please call the [facility] Triage line if you experience a temperature greater than or equal to 100.5, shaking chills, have uncontrolled nausea, vomiting and/or diarrhea, dizziness, shortness of breath, chest pain, bleeding, unexplained bruising, or if you have any other new/concerning symptoms, questions or concerns.     If it is after hours, weekends, or holidays, please call the main hospital  at  [phone] and ask to speak to the Oncology doctor on call.     If you are having any concerning symptoms or wish to speak to a provider before your next infusion visit, please call your care coordinator or triage to notify them so we can adequately serve you.     If you need a refill on a narcotic prescription or other medication, please call triage before your infusion appointment.

## 2018-08-23 NOTE — MR AVS SNAPSHOT
After Visit Summary   8/23/2018    Khloe Tamez    MRN: [id number]           Patient Information     Date Of Birth          1955        Visit Information        Provider Department      8/23/2018 2:00 PM UC 11 ATC; UC ONCOLOGY INFUSION [facility]        Today's Diagnoses     Melanoma of anus (H)    -  1    Metastatic malignant melanoma (H)          Care Instructions    Contact Numbers  [facility]: [phone]    After Hours:  [phone]  Triage: [phone]    Please call the [facility] Triage line if you experience a temperature greater than or equal to 100.5, shaking chills, have uncontrolled nausea, vomiting and/or diarrhea, dizziness, shortness of breath, chest pain, bleeding, unexplained bruising, or if you have any other new/concerning symptoms, questions or concerns.     If it is after hours, weekends, or holidays, please call the main hospital  at  [phone] and ask to speak to the Oncology doctor on call.     If you are having any concerning symptoms or wish to speak to a provider before your next infusion visit, please call your care coordinator or triage to notify them so we can adequately serve you.     If you need a refill on a narcotic prescription or other medication, please call triage before your infusion appointment.               Follow-ups after your visit        Your next 10 appointments already scheduled     Sep 20, 2018  9:45 AM CDT   [facility] Lab Draw with  [facility] DRAW   [facility] Lab Draw ([facility])    [address] [place] [address]  [address] [place] [address]   [phone]            Sep 20, 2018 10:30 AM CDT   Infusion 60 with UC ONCOLOGY INFUSION, UC 31 ATC   [facility] ([facility])    [address] [place] [address]  [address] [place] [address]   [phone]            Oct 18, 2018 10:30 AM CDT   [facility] Lab Draw with  [facility] LAB DRAW    [facility] Lab Draw ([facility])    [address]  [address]  [facility] [address]   [phone]            Oct 18, 2018 11:00 AM CDT   Infusion 60 with UC ONCOLOGY INFUSION, UC 29 ATC   [facility] ([facility])    [address]  [address]  [facility] [address]   [phone]            Nov 05, 2018  8:00 AM CST   CT CHEST ABDOMEN PELVIS W/O & W CONTRAST with UCCT1   [facility] CT ([facility])    [address]  1st Floor  [facility] [address]   [phone]           Please bring any scans or X-rays taken at other hospitals, if similar tests were done. Also bring a list of your medicines, including vitamins, minerals and over-the-counter drugs. It is safest to leave personal items at home.  Be sure to tell your doctor:   If you have any allergies.   If there s any chance you are pregnant.   If you are breastfeeding.  How to prepare:   Do not eat or drink for 2 hours before your exam. If you need to take medicine, you may take it with small sips of water. (We may ask you to take liquid medicine as well.)   Please wear loose clothing, such as a sweat suit or jogging clothes. Avoid snaps, zippers and other metal. We may ask you to undress and put on a hospital gown.  Please arrive 30 minutes early for your CT. Once in the department you might be asked to drink water 15-20 minutes prior to your exam.  If indicated you may be asked to drink an oral contrast in advance of your CT.  If this is the case, the imaging team will let you know or be in contact with you prior to your appointment  Patients over 70 or patients with diabetes or kidney problems:   If you haven t had a blood test (creatinine test) within the last 30 days, the Cardiologist/Radiologist may require you to get this test prior to your exam.  If you have diabetes:   Continue to take your metformin medication  on the day of your exam  If you have any questions, please call the Imaging Department where you will have your exam.            Nov 05, 2018 10:45 AM CST   [facility] Lab Draw with  [facility] LAB DRAW   [facility] Lab Draw ([facility])    [address] [place] [address] [address]  [address]  [facility] [id number] [phone]            Nov 05, 2018 11:15 AM CST   (Arrive by 11:00 AM)   Return Visit with Nigel Valentine MD   [facility] Cancer [facility] ([facility])    [address] [place] [address]  [address]  [facility] [id number] [phone]            Nov 05, 2018 12:00 PM CST   Infusion 60 with  ONCOLOGY INFUSION   [facility] ([facility])    [address] [place] [address]  [address]  [facility] [id number] [phone]              Who to contact     If you have questions or need follow up information about today's clinic visit or your schedule please contact [facility] directly at [phone].  Normal or non-critical lab and imaging results will be communicated to you by Hirihart, letter or phone within 4 business days after the clinic has received the results. If you do not hear from us within 7 days, please contact the clinic through [employer] or phone. If you have a critical or abnormal lab result, we will notify you by phone as soon as possible.  Submit refill requests through Fileboard or call your pharmacy and they will forward the refill request to us. Please allow 3 business days for your refill to be completed.          Additional Information About Your Visit        Fileboard Information     [employer]board gives you secure access to your electronic health record. If you see a primary care provider, you can also send messages to your care team and make appointments. If you have questions, please call your primary care clinic.  If you do not have a primary care provider, please call [phone] and they will  assist you.        Care EveryWhere ID     This is your Care EveryWhere ID. This could be used by other organizations to access your [facility] medical records  [id number]        Your Vitals Were     Pulse Temperature Respirations Pulse Oximetry BMI (Body Mass Index)       84 98.4  F (36.9  C) (Oral) 18 95% 33.2 kg/m2        Blood Pressure from Last 3 Encounters:   08/23/18 130/80   08/09/18 140/86   07/26/18 147/79    Weight from Last 3 Encounters:   08/23/18 93.3 kg (205 lb 11.2 oz)   08/09/18 91.4 kg (201 lb 6.4 oz)   07/26/18 91.7 kg (202 lb 3.2 oz)              We Performed the Following     CBC with platelets differential     Comprehensive metabolic panel     T4 free     TSH with free T4 reflex          Today's Medication Changes          These changes are accurate as of 8/23/18  3:57 PM.  If you have any questions, ask your nurse or doctor.               Start taking these medicines.        Dose/Directions    levothyroxine 125 MCG tablet   Commonly known as:  SYNTHROID/LEVOTHROID   Used for:  Drug-induced hypothyroidism   Started by:  Misha Desouza PA        Dose:  125 mcg   Take 1 tablet (125 mcg) by mouth daily   Quantity:  30 tablet   Refills:  0            Where to get your medicines      These medications were sent to [facility] [place], [place] - [address]  [address] [place] [address]    Hours:  TRANSPLANT PHONE NUMBER [phone] Phone:  [phone]     levothyroxine 125 MCG tablet                Primary Care Provider Office Phone # Fax #    Aide Robertson [phone]       [facility] [address] [place] [address]        Equal Access to Services     SHELIA WALKER AH: Dajuan Cox, karina lusierra, zac kaalmada marco, silvia guzmán. So [facility] [phone].    ATENCIÓN: Si habla español, tiene a tim disposición servicios gratuitos de asistencia  lingüística. Samantha al [phone].    We comply with applicable federal civil rights laws and [place] laws. We do not discriminate on the basis of race, color, national origin, age, disability, sex, sexual orientation, or gender identity.            Thank you!     Thank you for choosing [facility]  for your care. Our goal is always to provide you with excellent care. Hearing back from our patients is one way we can continue to improve our services. Please take a few minutes to complete the written survey that you may receive in the mail after your visit with us. Thank you!             Your Updated Medication List - Protect others around you: Learn how to safely use, store and throw away your medicines at www.disposemymeds.org.          This list is accurate as of 8/23/18  3:57 PM.  Always use your most recent med list.                   Brand Name Dispense Instructions for use Diagnosis    benzonatate 100 MG capsule    TESSALON    60 capsule    Take 1 capsule (100 mg) by mouth 2 times daily as needed for cough    Melanoma of anus (H)       calcium carbonate 500 MG tablet    OS-JANAY 500 mg [place]. Ca     Take 1 tablet by mouth every evening        guaiFENesin 600 MG 12 hr tablet    MUCINEX     Take 1,200 mg by mouth 2 times daily        levothyroxine 125 MCG tablet    SYNTHROID/LEVOTHROID    30 tablet    Take 1 tablet (125 mcg) by mouth daily    Drug-induced hypothyroidism       LORazepam 0.5 MG tablet    ATIVAN    30 tablet    Take 1 tablet (0.5 mg) by mouth every 4 hours as needed (Anxiety, Nausea/Vomiting or Sleep)    Melanoma of anus (H)       MAGNESIUM PO      Take by mouth At Bedtime        METAMUCIL PO      Take 1 packet by mouth        prochlorperazine 10 MG tablet    COMPAZINE    30 tablet    Take 1 tablet (10 mg) by mouth every 6 hours as needed (Nausea/Vomiting)    Melanoma of anus (H)       TYLENOL ARTHRITIS PAIN 650 MG CR tablet   Generic drug:  acetaminophen      Take 650 mg by mouth  every 6 hours as needed for mild pain or fever        TYLENOL PM EXTRA STRENGTH  MG tablet   Generic drug:  diphenhydrAMINE-acetaminophen      500 mg nightly as needed        VITAMIN D (CHOLECALCIFEROL) PO      Take by mouth every evening

## 2018-09-12 NOTE — TELEPHONE ENCOUNTER
Writer spoke to Misha Desouza.  Recommendations:  -Decrease Levothyroxine does to 100mcg daily for 2 weeks then recheck thyroid levels.     Writer sent TORB order to pharmacy; discussed plan with patient. All questions/concerns addressed.     La Romeo RN   [facility]

## 2018-09-20 NOTE — PATIENT INSTRUCTIONS
Contact Numbers    [facility] [place]: [phone]  [facility] [place] and after hours / weekends / holidays:  [phone]      Please call the triage or after hours line if you experience a temperature greater than or equal to 100.5, shaking chills, have uncontrolled nausea, vomiting and/or diarrhea, dizziness, shortness of breath, chest pain, bleeding, unexplained bruising, or if you have any other new/concerning symptoms, questions or concerns.      If you are having any concerning symptoms or wish to speak to a provider before your next infusion visit, please call your care coordinator or triage to notify them so we can adequately serve you.     If you need a refill on a narcotic prescription or other medication, please call before your infusion appointment.                   September 2018 Sunday Monday Tuesday Wednesday Thursday Friday Saturday                                 1       2     3     4     5     6     7     8       9     10     11     12     13     14     15       16     17     18     19     20     [facility] DRAW    9:45 AM   (15 min.)    [facility] DRAW   [facility]     [facility] ONC INFUSION 60   10:30 AM   (60 min.)    ONCOLOGY INFUSION   [facility] 21     22       23     24     25     26     27     28     29       30                                              October 2018 Sunday Monday Tuesday Wednesday Thursday Friday Saturday        1     2     3     4     5     6       7     8     9     10     11     12     13       14     15     16     17     18     [facility] DRAW   10:30 AM   (15 min.)    [facility] DRAW   [facility]     [facility] ONC INFUSION 60   11:00 AM   (60 min.)    ONCOLOGY INFUSION   [facility] 19     20       21     22     23     24     25     26     27       28     29     30     31                                Recent Results (from the past 24 hour(s))   Comprehensive metabolic panel    Collection Time:  09/20/18 10:24 AM   Result Value Ref Range    Sodium 138 133 - 144 mmol/L    Potassium 4.2 3.4 - 5.3 mmol/L    Chloride 105 94 - 109 mmol/L    Carbon Dioxide 25 20 - 32 mmol/L    Anion Gap 8 3 - 14 mmol/L    Glucose 86 70 - 99 mg/dL    Urea Nitrogen 21 7 - 30 mg/dL    Creatinine 0.75 0.52 - 1.04 mg/dL    GFR Estimate 78 >60 mL/min/1.7m2    GFR Estimate If Black >90 >60 mL/min/1.7m2    Calcium 8.7 8.5 - 10.1 mg/dL    Bilirubin Total 0.4 0.2 - 1.3 mg/dL    Albumin 3.7 3.4 - 5.0 g/dL    Protein Total 7.7 6.8 - 8.8 g/dL    Alkaline Phosphatase 104 40 - 150 U/L    ALT 22 0 - 50 U/L    AST 14 0 - 45 U/L   TSH with free T4 reflex    Collection Time: 09/20/18 10:24 AM   Result Value Ref Range    TSH 34.49 (H) 0.40 - 4.00 mU/L   CBC with platelets differential    Collection Time: 09/20/18 10:24 AM   Result Value Ref Range    WBC 6.3 4.0 - 11.0 10e9/L    RBC Count 5.00 3.8 - 5.2 10e12/L    Hemoglobin 14.5 11.7 - 15.7 g/dL    Hematocrit 45.3 35.0 - 47.0 %    MCV 91 78 - 100 fl    MCH 29.0 26.5 - 33.0 pg    MCHC 32.0 31.5 - 36.5 g/dL    RDW 14.5 10.0 - 15.0 %    Platelet Count 283 150 - 450 10e9/L    Diff Method Automated Method     % Neutrophils 63.0 %    % Lymphocytes 24.0 %    % Monocytes 6.2 %    % Eosinophils 5.3 %    % Basophils 1.0 %    % Immature Granulocytes 0.5 %    Nucleated RBCs 0 0 /100    Absolute Neutrophil 3.9 1.6 - 8.3 10e9/L    Absolute Lymphocytes 1.5 0.8 - 5.3 10e9/L    Absolute Monocytes 0.4 0.0 - 1.3 10e9/L    Absolute Eosinophils 0.3 0.0 - 0.7 10e9/L    Absolute Basophils 0.1 0.0 - 0.2 10e9/L    Abs Immature Granulocytes 0.0 0 - 0.4 10e9/L    Absolute Nucleated RBC 0.0    T4 free    Collection Time: 09/20/18 10:24 AM   Result Value Ref Range    T4 Free 0.96 0.76 - 1.46 ng/dL

## 2018-09-20 NOTE — PROGRESS NOTES
Infusion Nursing Note:  Khloe Tamez presents today for Cycle 5 Day 1 nivolumab.    Patient seen by provider today: No   present during visit today: Not Applicable.    Note: pt reports feeling better after decreasing dose of levothyroxine to 100mcg. This information was relayed to HUMBLE Pedersen; Misha sent in a refill of levothyroxine for pt.     Intravenous Access:  Peripheral IV placed.    Treatment Conditions:  Lab Results   Component Value Date    HGB 14.5 09/20/2018     Lab Results   Component Value Date    WBC 6.3 09/20/2018      Lab Results   Component Value Date    ANEU 3.9 09/20/2018     Lab Results   Component Value Date     09/20/2018      Lab Results   Component Value Date     09/20/2018                   Lab Results   Component Value Date    POTASSIUM 4.2 09/20/2018           No results found for: MAG         Lab Results   Component Value Date    CR 0.75 09/20/2018                   Lab Results   Component Value Date    JANAY 8.7 09/20/2018                Lab Results   Component Value Date    BILITOTAL 0.4 09/20/2018           Lab Results   Component Value Date    ALBUMIN 3.7 09/20/2018                    Lab Results   Component Value Date    ALT 22 09/20/2018           Lab Results   Component Value Date    AST 14 09/20/2018       Results reviewed, labs MET treatment parameters, ok to proceed with treatment.      Post Infusion Assessment:  Patient tolerated infusion without incident.  Blood return noted pre and post infusion.  Site patent and intact, free from redness, edema or discomfort.  No evidence of extravasations.  Access discontinued per protocol.    Discharge Plan:   Prescription refills given for levothyroxine.  AVS to patient via [employer].  Patient will return 10/18 for next appointment.   Patient discharged in stable condition accompanied by: .  Departure Mode: Ambulatory.    JERRY SWARTZ RN

## 2018-09-20 NOTE — MR AVS SNAPSHOT
After Visit Summary   9/20/2018    Khloe Tamez    MRN: [id number]           Patient Information     Date Of Birth          1955        Visit Information        Provider Department      9/20/2018 10:30 AM  31 ATC;  ONCOLOGY INFUSION [facility]        Today's Diagnoses     Melanoma of anus (H)    -  1    Metastatic malignant melanoma (H)        Drug-induced hypothyroidism          Care Instructions    Contact Numbers    [facility] Main Line: [phone]  [facility] Triage and after hours / weekends / holidays:  [phone]      Please call the triage or after hours line if you experience a temperature greater than or equal to 100.5, shaking chills, have uncontrolled nausea, vomiting and/or diarrhea, dizziness, shortness of breath, chest pain, bleeding, unexplained bruising, or if you have any other new/concerning symptoms, questions or concerns.      If you are having any concerning symptoms or wish to speak to a provider before your next infusion visit, please call your care coordinator or triage to notify them so we can adequately serve you.     If you need a refill on a narcotic prescription or other medication, please call before your infusion appointment.                   September 2018 Sunday Monday Tuesday Wednesday Thursday Friday Saturday                                 1       2     3     4     5     6     7     8       9     10     11     12     13     14     15       16     17     18     19     20     [facility] DRAW    9:45 AM   (15 min.)    [facility]   [facility] Draw     [facility] ONC INFUSION 60   10:30 AM   (60 min.)    ONCOLOGY INFUSION   [facility] 21     22       23     24     25     26     27     28     29       30 October 2018 Sunday Monday Tuesday Wednesday Thursday Friday Saturday        1     2     3     4     5     6       7     8     9     10     11     12     13        14     15     16     17     18     [facility] LAB DRAW   10:30 AM   (15 min.)   [facility] LAB DRAW   [facility] Lab Draw     [facility] ONC INFUSION 60   11:00 AM   (60 min.)    ONCOLOGY INFUSION   [facility] 19     20       21     22     23     24     25     26     27       28     29     30     31                                Recent Results (from the past 24 hour(s))   Comprehensive metabolic panel    Collection Time: 09/20/18 10:24 AM   Result Value Ref Range    Sodium 138 133 - 144 mmol/L    Potassium 4.2 3.4 - 5.3 mmol/L    Chloride 105 94 - 109 mmol/L    Carbon Dioxide 25 20 - 32 mmol/L    Anion Gap 8 3 - 14 mmol/L    Glucose 86 70 - 99 mg/dL    Urea Nitrogen 21 7 - 30 mg/dL    Creatinine 0.75 0.52 - 1.04 mg/dL    GFR Estimate 78 >60 mL/min/1.7m2    GFR Estimate If Black >90 >60 mL/min/1.7m2    Calcium 8.7 8.5 - 10.1 mg/dL    Bilirubin Total 0.4 0.2 - 1.3 mg/dL    Albumin 3.7 3.4 - 5.0 g/dL    Protein Total 7.7 6.8 - 8.8 g/dL    Alkaline Phosphatase 104 40 - 150 U/L    ALT 22 0 - 50 U/L    AST 14 0 - 45 U/L   TSH with free T4 reflex    Collection Time: 09/20/18 10:24 AM   Result Value Ref Range    TSH 34.49 (H) 0.40 - 4.00 mU/L   CBC with platelets differential    Collection Time: 09/20/18 10:24 AM   Result Value Ref Range    WBC 6.3 4.0 - 11.0 10e9/L    RBC Count 5.00 3.8 - 5.2 10e12/L    Hemoglobin 14.5 11.7 - 15.7 g/dL    Hematocrit 45.3 35.0 - 47.0 %    MCV 91 78 - 100 fl    MCH 29.0 26.5 - 33.0 pg    MCHC 32.0 31.5 - 36.5 g/dL    RDW 14.5 10.0 - 15.0 %    Platelet Count 283 150 - 450 10e9/L    Diff Method Automated Method     % Neutrophils 63.0 %    % Lymphocytes 24.0 %    % Monocytes 6.2 %    % Eosinophils 5.3 %    % Basophils 1.0 %    % Immature Granulocytes 0.5 %    Nucleated RBCs 0 0 /100    Absolute Neutrophil 3.9 1.6 - 8.3 10e9/L    Absolute Lymphocytes 1.5 0.8 - 5.3 10e9/L    Absolute Monocytes 0.4 0.0 - 1.3 10e9/L    Absolute Eosinophils 0.3 0.0 - 0.7 10e9/L    Absolute  Basophils 0.1 0.0 - 0.2 10e9/L    Abs Immature Granulocytes 0.0 0 - 0.4 10e9/L    Absolute Nucleated RBC 0.0    T4 free    Collection Time: 09/20/18 10:24 AM   Result Value Ref Range    T4 Free 0.96 0.76 - 1.46 ng/dL                 Follow-ups after your visit        Your next 10 appointments already scheduled     Oct 18, 2018 10:30 AM CDT   [facility] with [facility]   [facility] Lab Draw ([facility])    [address]  [address]  [place] [address]   [phone]            Oct 18, 2018 11:00 AM CDT   Infusion 60 with UC ONCOLOGY INFUSION, UC 29 ATC   [facility] ([facility])    [address]  [address]  [place] [address]   [phone]            Nov 05, 2018  8:00 AM CST   CT CHEST ABDOMEN PELVIS W/O & W CONTRAST with UCCT1   [facility] CT ([facility])    [address]  1st [address]  [place] [address]   [phone]           How do I prepare for my exam? (Food and drink instructions) To prepare: Do not eat or drink for 2 hours before your exam. If you need to take medicine, you may take it with small sips of water. (We may ask you to take liquid medicine as well.)  How do I prepare for my exam? (Other instructions) Please arrive 30 minutes early for your CT.  Once in the department you might be asked to drink water 15-20 minutes prior to your exam.  If indicated you may be asked to drink an oral contrast in advance of your CT.  If this is the case, the imaging team will let you know or be in contact with you prior to your appointment  Patients over 70 or patients with diabetes or kidney problems: If you haven t had a blood test (creatinine test) within the last 30 days, the Cardiologist/Radiologist may require you to get this test prior to your exam.  If you have diabetes:  Continue to take your metformin medication on the day of your exam  What  should I wear: Please wear loose clothing, such as a sweat suit or jogging clothes. Avoid snaps, zippers and other metal. We may ask you to undress and put on a hospital gown.  How long does the exam take: Most scans take less than 20 minutes.  What should I bring: Please bring any scans or X-rays taken at other hospitals, if similar tests were done. Also bring a list of your medicines, including vitamins, minerals and over-the-counter drugs. It is safest to leave personal items at home.  Do I need a : No  is needed.  What do I need to tell my doctor? Be sure to tell your doctor: * If you have any allergies. * If there s any chance you are pregnant. * If you are breastfeeding.  What should I do after the exam: No restrictions, You may resume normal activities.  What is this test: A CT (computed tomography) scan is a series of pictures that allows us to look inside your body. The scanner creates images of the body in cross sections, much like slices of bread. This helps us see any problems more clearly. You may receive contrast (X-ray dye) before or during your scan. You will be asked to drink the contrast.  Who should I call with questions: If you have any questions, please call the Imaging Department where you will have your exam. Directions, parking instructions, and other information is available on our website, [email or web address].            Nov 05, 2018 10:45 AM CST   [facility] Draw with  [facility] DRAW   [facility] ([facility])    [address] [place] [address]  [address]  [facility] [place] [address] [phone]            Nov 05, 2018 11:15 AM CST   (Arrive by 11:00 AM)   Return Visit with Nigel Valentine MD   [facility] ([facility])    [address]  [address]  [facility] [place] [address] [phone]            Nov 05, 2018 12:00 PM CST   Infusion 60 with  ONCOLOGY INFUSION,  28 ATC   [facility]  [facility] ([facility])    [address]  [address]  [place] [address] [phone]              Who to contact     If you have questions or need follow up information about today's clinic visit or your schedule please contact [facility] directly at [phone].  Normal or non-critical lab and imaging results will be communicated to you by MyChart, letter or phone within 4 business days after the clinic has received the results. If you do not hear from us within 7 days, please contact the clinic through [employer] or phone. If you have a critical or abnormal lab result, we will notify you by phone as soon as possible.  Submit refill requests through [employer] or call your pharmacy and they will forward the refill request to us. Please allow 3 business days for your refill to be completed.          Additional Information About Your Visit        [employer] Information     [employer] gives you secure access to your electronic health record. If you see a primary care provider, you can also send messages to your care team and make appointments. If you have questions, please call your primary care clinic.  If you do not have a primary care provider, please call [phone] and they will assist you.        Care EveryWhere ID     This is your Care EveryWhere ID. This could be used by other organizations to access your [facility] medical records  [id number]        Your Vitals Were     Pulse Temperature Respirations Pulse Oximetry BMI (Body Mass Index)       71 98.6  F (37  C) (Oral) 18 97% 33.04 kg/m2        Blood Pressure from Last 3 Encounters:   09/20/18 148/83   08/23/18 130/80   08/09/18 140/86    Weight from Last 3 Encounters:   09/20/18 92.9 kg (204 lb 11.2 oz)   08/23/18 93.3 kg (205 lb 11.2 oz)   08/09/18 91.4 kg (201 lb 6.4 oz)              We Performed the Following     CBC with platelets differential     Comprehensive metabolic panel     T4 free      T4 free     TSH with free T4 reflex          Where to get your medicines      These medications were sent to [place], [place] - [address]  [address], [facility] [facility] [address]    Hours:  TRANSPLANT PHONE NUMBER [phone] Phone:  [phone]     levothyroxine 100 MCG tablet          Primary Care Provider Office Phone # Fax #    Aide Robertson [phone]       [facility] [address] [address]        Equal Access to Services     [facility] AARON : Hadii katelyn wade hadasho Soomaali, waaxda luqadaha, qaybta kaalmada adeegyaem, silvia guzmán. So [facility] [phone].    ATENCIÓN: Si habla español, tiene a tim disposición servicios gratuitos de asistencia lingüística. [facility] [phone].    We comply with applicable federal civil rights laws and [place] laws. We do not discriminate on the basis of race, color, national origin, age, disability, sex, sexual orientation, or gender identity.            Thank you!     Thank you for choosing [facility]  for your care. Our goal is always to provide you with excellent care. Hearing back from our patients is one way we can continue to improve our services. Please take a few minutes to complete the written survey that you may receive in the mail after your visit with us. Thank you!             Your Updated Medication List - Protect others around you: Learn how to safely use, store and throw away your medicines at www.disposemymeds.org.          This list is accurate as of 9/20/18  2:46 PM.  Always use your most recent med list.                   Brand Name Dispense Instructions for use Diagnosis    benzonatate 100 MG capsule    TESSALON    60 capsule    Take 1 capsule (100 mg) by mouth 2 times daily as needed for cough    Melanoma of anus (H)       calcium carbonate 500 mg {elemental} 500 MG tablet    OS-JANAY     Take 1  tablet by mouth every evening        DIPHENHYDRAMINE HCL PO      Take 25 mg by mouth nightly as needed        guaiFENesin 600 MG 12 hr tablet    MUCINEX     Take 1,200 mg by mouth 2 times daily        levothyroxine 100 MCG tablet    SYNTHROID/LEVOTHROID    30 tablet    Take 1 tablet (100 mcg) by mouth daily    Drug-induced hypothyroidism       LORazepam 0.5 MG tablet    ATIVAN    30 tablet    Take 1 tablet (0.5 mg) by mouth every 4 hours as needed (Anxiety, Nausea/Vomiting or Sleep)    Melanoma of anus (H)       MAGNESIUM PO      Take by mouth At Bedtime        METAMUCIL PO      Take 1 packet by mouth        prochlorperazine 10 MG tablet    COMPAZINE    30 tablet    Take 1 tablet (10 mg) by mouth every 6 hours as needed (Nausea/Vomiting)    Melanoma of anus (H)       TYLENOL ARTHRITIS PAIN 650 MG CR tablet   Generic drug:  acetaminophen      Take 650 mg by mouth every 6 hours as needed for mild pain or fever        VITAMIN D (CHOLECALCIFEROL) PO      Take by mouth every evening

## 2018-09-20 NOTE — NURSING NOTE
Chief Complaint   Patient presents with     Blood Draw     Labs drawn via PIV placed by Grace GASPAR. Line flushed with saline. VS taken. Pt  checked in for appt.       Labs drawn from PIV placed by Grace GASPAR. Line flushed with saline. Vitals taken. Pt checked in for appointment(s).    CHASITY East RN

## 2018-10-18 NOTE — MR AVS SNAPSHOT
After Visit Summary   10/18/2018    Khloe Tamez    MRN: [id number]           Patient Information     Date Of Birth          1955        Visit Information        Provider Department      10/18/2018 11:00 AM  29 ATC;  ONCOLOGY [facility]        Today's Diagnoses     Melanoma of anus (H)    -  1    Metastatic malignant melanoma (H)          Care Instructions          Zoster Vaccine Recombinant, Adjuvanted (By injection)  Zoster Vaccine Recombinant, Adjuvanted (ZOS-ter VAX-een makayla-KOM-bi-richelle, GM-mct-wbw-ted)  Prevents herpes zoster (shingles).  Brand Name(s):Shingrix  There may be other brand names for this medicine.  When This Medicine Should Not Be Used:  This medicine is not right for everyone. You should not receive it if you had an allergic reaction to zoster vaccine.  How to Use This Medicine:  Injectable  Your doctor will prescribe your exact dose and tell you how often it should be given. This medicine is given as a shot into one of your muscles.   A nurse or other health provider will give you this medicine.   Missed dose: Call your doctor or pharmacist for instructions.   Drugs and Foods to Avoid:  Ask your doctor or pharmacist before using any other medicine, including over-the-counter medicines, vitamins, and herbal products.  Some medicines may affect how zoster vaccine works. Tell your doctor if you are receiving inactivated influenza vaccine or medicine that can weaken your immune system.  Warnings While Using This Medicine:  Tell your doctor if you are pregnant or breastfeeding, or if you have a weak immune system.   Your doctor will check your progress and the effects of this medicine at regular visits. Keep all appointments.   Possible Side Effects While Using This Medicine:  Call your doctor right away if you notice any of these side effects:  Allergic reaction: Itching or hives, swelling in your face or hands, swelling or tingling in your  mouth or throat, chest tightness, trouble breathing  If you notice these less serious side effects, talk with your doctor:  Headache   Joint or muscle pain   Pain, itching, burning, swelling, or a lump under your skin where the shot was given  If you notice other side effects that you think are caused by this medicine, tell your doctor.  Call your doctor for medical advice about side effects. You may report side effects to FDA at 0-648-VCY-6820    Copyright [employer] 2018 Generated on Thursday, October 18, 2018 12:58:33 PM       [facility] Main Line: [phone]    Call triage nurse with chills and/or temperature greater than or equal to 100.4, uncontrolled nausea/vomiting, diarrhea, constipation, dizziness, shortness of breath, chest pain, bleeding, unexplained bruising, or any new/concerning symptoms, questions/concerns.     If you are having any concerning symptoms or wish to speak to a provider before your next infusion visit, please call your care coordinator or triage to notify them so we can adequately serve you.     For triage nurse, after hours, weekends, and holidays: [phone]          October 2018 Sunday Monday Tuesday Wednesday Thursday Friday Saturday        1     2     3     4     5     6       7     8     9     10     11     12     13       14     15     16     17     18     MYCHART SCREENING MAMMOGRAM    9:15 AM   (15 min.)   UCBCMA1   [facility]     [facility] DRAW   10:30 AM   (15 min.)    [facility] DRAW   [facility] Lab Draw     [facility] ONC INFUSION 60   11:00 AM   (60 min.)    ONCOLOGY INFUSION   [facility] 19     20       21     22     23     24     25     26     27       28     29     30     31 November 2018 Sunday Monday Tuesday Wednesday Thursday Friday Saturday                       1     2     3       4     5     CT CHEST ABDOMEN PELVIS WWO    8:00 AM   (20 min.)   UCCT1     [facility] DRAW   10:45 AM   (15 min.)   [facility] LAB DRAW   [facility] Lab Draw     [facility] RETURN   11:00 AM   (30 min.)   Nigel Gauthier MD   [facility] ONC INFUSION 60   12:00 PM   (60 min.)    ONCOLOGY INFUSION   [facility] 6     7     8     9     10       11     12     13     14     15     16     17       18     19     20     21     22     23     24       25     26     27     28     29     30                       Lab Results:  Recent Results (from the past 12 hour(s))   Comprehensive metabolic panel    Collection Time: 10/18/18 10:59 AM   Result Value Ref Range    Sodium 136 133 - 144 mmol/L    Potassium 3.9 3.4 - 5.3 mmol/L    Chloride 105 94 - 109 mmol/L    Carbon Dioxide 26 20 - 32 mmol/L    Anion Gap 6 3 - 14 mmol/L    Glucose 93 70 - 99 mg/dL    Urea Nitrogen 19 7 - 30 mg/dL    Creatinine 0.86 0.52 - 1.04 mg/dL    GFR Estimate 67 >60 mL/min/1.7m2    GFR Estimate If Black 82 >60 mL/min/1.7m2    Calcium 8.4 (L) 8.5 - 10.1 mg/dL    Bilirubin Total 0.4 0.2 - 1.3 mg/dL    Albumin 3.6 3.4 - 5.0 g/dL    Protein Total 7.7 6.8 - 8.8 g/dL    Alkaline Phosphatase 108 40 - 150 U/L    ALT 21 0 - 50 U/L    AST 11 0 - 45 U/L   TSH with free T4 reflex    Collection Time: 10/18/18 10:59 AM   Result Value Ref Range    TSH 32.00 (H) 0.40 - 4.00 mU/L   CBC with platelets differential    Collection Time: 10/18/18 10:59 AM   Result Value Ref Range    WBC 6.3 4.0 - 11.0 10e9/L    RBC Count 4.86 3.8 - 5.2 10e12/L    Hemoglobin 14.1 11.7 - 15.7 g/dL    Hematocrit 43.7 35.0 - 47.0 %    MCV 90 78 - 100 fl    MCH 29.0 26.5 - 33.0 pg    MCHC 32.3 31.5 - 36.5 g/dL    RDW 14.5 10.0 - 15.0 %    Platelet Count 299 150 - 450 10e9/L    Diff Method Automated Method     % Neutrophils 60.1 %    % Lymphocytes 26.1 %    % Monocytes 5.7 %    % Eosinophils 6.8 %    % Basophils 1.0 %    % Immature Granulocytes 0.3 %    Nucleated RBCs 0 0 /100     Absolute Neutrophil 3.8 1.6 - 8.3 10e9/L    Absolute Lymphocytes 1.6 0.8 - 5.3 10e9/L    Absolute Monocytes 0.4 0.0 - 1.3 10e9/L    Absolute Eosinophils 0.4 0.0 - 0.7 10e9/L    Absolute Basophils 0.1 0.0 - 0.2 10e9/L    Abs Immature Granulocytes 0.0 0 - 0.4 10e9/L    Absolute Nucleated RBC 0.0    T4 free    Collection Time: 10/18/18 10:59 AM   Result Value Ref Range    T4 Free 1.00 0.76 - 1.46 ng/dL               Follow-ups after your visit        Your next 10 appointments already scheduled     Nov 05, 2018  8:00 AM CST   CT CHEST ABDOMEN PELVIS W/O & W CONTRAST with UCCT1   [facility] ([facility])    [address] [address] [phone]           How do I prepare for my exam? (Food and drink instructions) To prepare: Do not eat or drink for 2 hours before your exam. If you need to take medicine, you may take it with small sips of water. (We may ask you to take liquid medicine as well.)  How do I prepare for my exam? (Other instructions) Please arrive 30 minutes early for your CT.  Once in the department you might be asked to drink water 15-20 minutes prior to your exam.  If indicated you may be asked to drink an oral contrast in advance of your CT.  If this is the case, the imaging team will let you know or be in contact with you prior to your appointment  Patients over 70 or patients with diabetes or kidney problems: If you haven t had a blood test (creatinine test) within the last 30 days, the Cardiologist/Radiologist may require you to get this test prior to your exam.  If you have diabetes:  Continue to take your metformin medication on the day of your exam  What should I wear: Please wear loose clothing, such as a sweat suit or jogging clothes. Avoid snaps, zippers and other metal. We may ask you to undress and put on a hospital gown.  How long does the exam take: Most scans take less than 20 minutes.  What should I bring: Please  bring any scans or X-rays taken at other hospitals, if similar tests were done. Also bring a list of your medicines, including vitamins, minerals and over-the-counter drugs. It is safest to leave personal items at home.  Do I need a : No  is needed.  What do I need to tell my doctor? Be sure to tell your doctor: * If you have any allergies. * If there s any chance you are pregnant. * If you are breastfeeding.  What should I do after the exam: No restrictions, You may resume normal activities.  What is this test: A CT (computed tomography) scan is a series of pictures that allows us to look inside your body. The scanner creates images of the body in cross sections, much like slices of bread. This helps us see any problems more clearly. You may receive contrast (X-ray dye) before or during your scan. You will be asked to drink the contrast.  Who should I call with questions: If you have any questions, please call the Imaging Department where you will have your exam. Directions, parking instructions, and other information is available on our website, [email or web address].            Nov 05, 2018 10:45 AM CST   [facility] Draw with UC [facility] DRAW   [facility] ([facility])    [address] [place] [address]  [address]  [facility] [address]   [phone]            Nov 05, 2018 11:15 AM CST   (Arrive by 11:00 AM)   Return Visit with Nigel Valentine MD   [facility] ([facility])    [address] [place] [address]  [address]  [facility] [address]   [phone]            Nov 05, 2018 12:00 PM CST   Infusion 60 with UC ONCOLOGY INFUSION, UC 28 ATC   [facility] ([facility])    [address] [place] [address]  [address]  [facility] [address]   [phone]            Dec 06, 2018  9:30 AM CST   [facility] Lab Draw with UC [facility] LAB DRAW   [facility] Draw ([facility])    [address]  [address]  [facility] [address] [phone]            Dec 06, 2018 10:00 AM CST   Infusion 60 with UC ONCOLOGY INFUSION, UC 29 ATC   [facility] ([facility])    [address] [facility]  [address]  [facility] [address] [phone]              Who to contact     If you have questions or need follow up information about today's clinic visit or your schedule please contact [facility] directly at [phone].  Normal or non-critical lab and imaging results will be communicated to you by [employer], letter or phone within 4 business days after the clinic has received the results. If you do not hear from us within 7 days, please contact the clinic through [employer] or phone. If you have a critical or abnormal lab result, we will notify you by phone as soon as possible.  Submit refill requests through [employer] or call your pharmacy and they will forward the refill request to us. Please allow 3 business days for your refill to be completed.          Additional Information About Your Visit        MyChart Information     [employer] gives you secure access to your electronic health record. If you see a primary care provider, you can also send messages to your care team and make appointments. If you have questions, please call your primary care clinic.  If you do not have a primary care provider, please call [phone] and they will assist you.        Care EveryWhere ID     This is your Care EveryWhere ID. This could be used by other organizations to access your [facility] medical records  [id number]        Your Vitals Were     Pulse Temperature Respirations Pulse Oximetry BMI (Body Mass Index)       79 98.8  F (37.1  C) (Oral) 16 99% 33.15 kg/m2        Blood Pressure from Last 3 Encounters:   10/18/18 140/90   09/20/18 148/83   08/23/18 130/80    Weight from Last 3 Encounters:   10/18/18 93.2 kg (205 lb 6.4 oz)    09/20/18 92.9 kg (204 lb 11.2 oz)   08/23/18 93.3 kg (205 lb 11.2 oz)              We Performed the Following     CBC with platelets differential     Comprehensive metabolic panel     T4 free     T4 free     TSH with free T4 reflex        Primary Care Provider Office Phone # Fax #    Aide Robertson [phone]       [facility] [address] [address]        Equal Access to Services     SHELIA WALKER : Hadii aad ku hadasho Soomaali, waaxda luqadaha, qaybta kaalmada adeegyada, waxay idiin hayaan adeeg iggy fierro . So [facility] [phone].    ATENCIÓN: Si armando nj, tiene a tim disposición servicios gratuitos de asistencia lingüística. [facility] [phone].    We comply with applicable federal civil rights laws and [place] laws. We do not discriminate on the basis of race, color, national origin, age, disability, sex, sexual orientation, or gender identity.            Thank you!     Thank you for choosing [facility]CER [facility]  for your care. Our goal is always to provide you with excellent care. Hearing back from our patients is one way we can continue to improve our services. Please take a few minutes to complete the written survey that you may receive in the mail after your visit with us. Thank you!             Your Updated Medication List - Protect others around you: Learn how to safely use, store and throw away your medicines at www.disposemymeds.org.          This list is accurate as of 10/18/18 12:59 PM.  Always use your most recent med list.                   Brand Name Dispense Instructions for use Diagnosis    benzonatate 100 MG capsule    TESSALON    60 capsule    Take 1 capsule (100 mg) by mouth 2 times daily as needed for cough    Melanoma of anus (H)       calcium carbonate 500 mg (elemental) 500 MG tablet    OS-JANAY     Take 1 tablet by mouth every evening        DIPHENHYDRAMINE HCL PO      Take 25 mg by mouth nightly as needed         guaiFENesin 600 MG 12 hr tablet    MUCINEX     Take 1,200 mg by mouth 2 times daily        levothyroxine 100 MCG tablet    SYNTHROID/LEVOTHROID    30 tablet    Take 1 tablet (100 mcg) by mouth daily    Drug-induced hypothyroidism       LORazepam 0.5 MG tablet    ATIVAN    30 tablet    Take 1 tablet (0.5 mg) by mouth every 4 hours as needed (Anxiety, Nausea/Vomiting or Sleep)    Melanoma of anus (H)       MAGNESIUM PO      Take by mouth At Bedtime        METAMUCIL PO      Take 1 packet by mouth        prochlorperazine 10 MG tablet    COMPAZINE    30 tablet    Take 1 tablet (10 mg) by mouth every 6 hours as needed (Nausea/Vomiting)    Melanoma of anus (H)       TYLENOL ARTHRITIS PAIN 650 MG CR tablet   Generic drug:  acetaminophen      Take 650 mg by mouth every 6 hours as needed for mild pain or fever        VITAMIN D (CHOLECALCIFEROL) PO      Take by mouth every evening

## 2018-10-18 NOTE — PROGRESS NOTES
"Infusion Nursing Note:  Khloe Tamez presents today for Cycle 6 Day 1 nivolumab.    Patient seen by provider today: No   present during visit today: Not Applicable.    Note: Roseline reports to infusion feeling well. She notes that her cough has been \"persnickety\" lately, but she continues to use the Tessalon and Mucinex. Otherwise she offers no new concerns.     Intravenous Access:   Peripheral IV placed.    Treatment Conditions:  Lab Results   Component Value Date    HGB 14.1 10/18/2018     Lab Results   Component Value Date    WBC 6.3 10/18/2018      Lab Results   Component Value Date    ANEU 3.8 10/18/2018     Lab Results   Component Value Date     10/18/2018      Lab Results   Component Value Date     10/18/2018                   Lab Results   Component Value Date    POTASSIUM 3.9 10/18/2018           No results found for: MAG         Lab Results   Component Value Date    CR 0.86 10/18/2018                   Lab Results   Component Value Date    JANAY 8.4 10/18/2018                Lab Results   Component Value Date    BILITOTAL 0.4 10/18/2018           Lab Results   Component Value Date    ALBUMIN 3.6 10/18/2018                    Lab Results   Component Value Date    ALT 21 10/18/2018           Lab Results   Component Value Date    AST 11 10/18/2018     Results reviewed, labs MET treatment parameters, ok to proceed with treatment.    Post Infusion Assessment:  Patient tolerated infusion without incident.  Patient tolerated her Shingrix injection in the LEFT deltoid without incident.   Blood return noted pre and post infusion.  Site patent and intact, free from redness, edema or discomfort.  No evidence of extravasations.  Access discontinued per protocol.    Discharge Plan:   Patient declined prescription refills.  Patient and/or family verbalized understanding of discharge instructions and all questions answered.  AVS to patient via [employer].  Patient will return 11/5/2018 for next " appointment.   IB to Misha Desouza PA-C, to schedule for second dose of Shingrix.  Patient discharged in stable condition.  Departure Mode: Ambulatory.    Slick Browning RN

## 2018-10-18 NOTE — NURSING NOTE
Chief Complaint   Patient presents with     Blood Draw     labs drawn with vpt by rn.  vs taken     Labs drawn with vpt by rn.  Pt tolerated well.  VS taken.  Pt checked in for next appt.    Sandie Archer RN

## 2018-10-18 NOTE — PATIENT INSTRUCTIONS
Zoster Vaccine Recombinant, Adjuvanted (By injection)  Zoster Vaccine Recombinant, Adjuvanted (ZOS-ter VAX-een Wagner Limon)  Prevents herpes zoster (shingles).  Brand Name(s):Shingrix  There may be other brand names for this medicine.  When This Medicine Should Not Be Used:  This medicine is not right for everyone. You should not receive it if you had an allergic reaction to zoster vaccine.  How to Use This Medicine:  Injectable  Your doctor will prescribe your exact dose and tell you how often it should be given. This medicine is given as a shot into one of your muscles.   A nurse or other health provider will give you this medicine.   Missed dose: Call your doctor or pharmacist for instructions.   Drugs and Foods to Avoid:  Ask your doctor or pharmacist before using any other medicine, including over-the-counter medicines, vitamins, and herbal products.  Some medicines may affect how zoster vaccine works. Tell your doctor if you are receiving inactivated influenza vaccine or medicine that can weaken your immune system.  Warnings While Using This Medicine:  Tell your doctor if you are pregnant or breastfeeding, or if you have a weak immune system.   Your doctor will check your progress and the effects of this medicine at regular visits. Keep all appointments.   Possible Side Effects While Using This Medicine:  Call your doctor right away if you notice any of these side effects:  Allergic reaction: Itching or hives, swelling in your face or hands, swelling or tingling in your mouth or throat, chest tightness, trouble breathing  If you notice these less serious side effects, talk with your doctor:  Headache   Joint or muscle pain   Pain, itching, burning, swelling, or a lump under your skin where the shot was given  If you notice other side effects that you think are caused by this medicine, tell your doctor.  Call your doctor for medical advice about side effects. You may report side  effects to FDA at 3-800-FDA-1088    Copyright [employer] 2018 Generated on Thursday, October 18, 2018 12:58:33 PM       [facility] Main Line: [phone]    Call triage nurse with chills and/or temperature greater than or equal to 100.4, uncontrolled nausea/vomiting, diarrhea, constipation, dizziness, shortness of breath, chest pain, bleeding, unexplained bruising, or any new/concerning symptoms, questions/concerns.     If you are having any concerning symptoms or wish to speak to a provider before your next infusion visit, please call your care coordinator or triage to notify them so we can adequately serve you.     For triage nurse, after hours, weekends, and holidays: [phone]          October 2018 Sunday Monday Tuesday Wednesday Thursday Friday Saturday        1     2     3     4     5     6       7     8     9     10     11     12     13       14     15     16     17     18     MYCHART SCREENING MAMMOGRAM    9:15 AM   (15 min.)   UCBCMA1   [facility] [place] [facility]     [facility] LAB DRAW   10:30 AM   (15 min.)    [facility] LAB DRAW   [facility] Lab Draw     [facility] ONC INFUSION 60   11:00 AM   (60 min.)   UC ONCOLOGY INFUSION   [facility] 19     20       21     22     23     24     25     26     27       28     29     30     31 November 2018 Sunday Monday Tuesday Wednesday Thursday Friday Saturday                       1     2     3       4     5     CT CHEST ABDOMEN PELVIS WWO    8:00 AM   (20 min.)   UCCT1   [facility] CT     [facility] LAB DRAW   10:45 AM   (15 min.)    [facility]   [facility]     [facility] RETURN   11:00 AM   (30 min.)   Nigel Gauthier MD   [facility]     [facility] ONC INFUSION 60   12:00 PM   (60 min.)    ONCOLOGY INFUSION   [facility] 6     7     8     9     10       11     12     13     14     15     16     17       18      19     20     21     22     23     24       25     26     27     28     29     30                       Lab Results:  Recent Results (from the past 12 hour(s))   Comprehensive metabolic panel    Collection Time: 10/18/18 10:59 AM   Result Value Ref Range    Sodium 136 133 - 144 mmol/L    Potassium 3.9 3.4 - 5.3 mmol/L    Chloride 105 94 - 109 mmol/L    Carbon Dioxide 26 20 - 32 mmol/L    Anion Gap 6 3 - 14 mmol/L    Glucose 93 70 - 99 mg/dL    Urea Nitrogen 19 7 - 30 mg/dL    Creatinine 0.86 0.52 - 1.04 mg/dL    GFR Estimate 67 >60 mL/min/1.7m2    GFR Estimate If Black 82 >60 mL/min/1.7m2    Calcium 8.4 (L) 8.5 - 10.1 mg/dL    Bilirubin Total 0.4 0.2 - 1.3 mg/dL    Albumin 3.6 3.4 - 5.0 g/dL    Protein Total 7.7 6.8 - 8.8 g/dL    Alkaline Phosphatase 108 40 - 150 U/L    ALT 21 0 - 50 U/L    AST 11 0 - 45 U/L   TSH with free T4 reflex    Collection Time: 10/18/18 10:59 AM   Result Value Ref Range    TSH 32.00 (H) 0.40 - 4.00 mU/L   CBC with platelets differential    Collection Time: 10/18/18 10:59 AM   Result Value Ref Range    WBC 6.3 4.0 - 11.0 10e9/L    RBC Count 4.86 3.8 - 5.2 10e12/L    Hemoglobin 14.1 11.7 - 15.7 g/dL    Hematocrit 43.7 35.0 - 47.0 %    MCV 90 78 - 100 fl    MCH 29.0 26.5 - 33.0 pg    MCHC 32.3 31.5 - 36.5 g/dL    RDW 14.5 10.0 - 15.0 %    Platelet Count 299 150 - 450 10e9/L    Diff Method Automated Method     % Neutrophils 60.1 %    % Lymphocytes 26.1 %    % Monocytes 5.7 %    % Eosinophils 6.8 %    % Basophils 1.0 %    % Immature Granulocytes 0.3 %    Nucleated RBCs 0 0 /100    Absolute Neutrophil 3.8 1.6 - 8.3 10e9/L    Absolute Lymphocytes 1.6 0.8 - 5.3 10e9/L    Absolute Monocytes 0.4 0.0 - 1.3 10e9/L    Absolute Eosinophils 0.4 0.0 - 0.7 10e9/L    Absolute Basophils 0.1 0.0 - 0.2 10e9/L    Abs Immature Granulocytes 0.0 0 - 0.4 10e9/L    Absolute Nucleated RBC 0.0    T4 free    Collection Time: 10/18/18 10:59 AM   Result Value Ref Range    T4 Free 1.00 0.76 - 1.46 ng/dL

## 2018-11-15 NOTE — PROGRESS NOTES
Oncology/Hematology Visit Note  Nov 15, 2018    Reason for Visit: follow up of perianal melanoma    History of Present Illness: Khloe Tamez is a 62 year old female with perianal melanoma. Her oncologic history is as follows:     1. 3/27/18 patient had perianal lesion biopsied in [place] ([id number]). This showed malignant melanoma, positive for HMB45 and S100, and negative for CD45 and pancytokeratin. Breslow depth at least 3.5 mm, non-ulcerated, with 5 mitoses/mm2, TILs present with deep and peripheral margins positive; pT3a.  2. 3/29/18, PET-CT showed a perianal FDG avid lesion (SUV max 10.8) and a superficial left inguinal lymph node measuring 0.9 cm (SUV max 4.1).  3. 4/13/18, she has wide local excision of a 2 x 2.5 cm perianal melanoma with anaplastic closure, under Dr. Wright. Pathology showed a nodular melanoma, Breslow depth 6.0 mm to mark's level IV, there were more than 10 mitoses per mm2, with ulceration, and lymphovascular invasion present. No perineural invasion or microsatellitosis. Tumor infiltrating lymphocytes present, non-brisk. AJCC microstage: pT4b.  4. 4/26/18, she has MRI brain, which is negative for metastatic disease.  5. 5/2/18, she returns to OR under Dr. Shah for left femoral sentinel lymph node biopsy, with 2 (of 3) involved lymph nodes. IHC positive for Melan-A, tyrosinase, and HMB-45. No definite extranodal extension. KPS 90. PD-1 0%  6. 5/31/18, started on adjuvant Nivolumab  7. 6/14/18 PETCT (for baseline purposes after surgery) with possible pulmonary and right gastrocnemius muscle metastases     She comes in today for routine follow up prior to cycle 7 Nivolumab.    Interval History:  Khloe is here with her . She has been feeling fatigued. She was initially started on 125mcg of levthyroxine but she then felt hot and couldn't sleep. The dose was reduced to 100 mcg in September. Over the past 1 month, she feels she is having much more fatigue. She sleeps about 10  hours/night and takes nap for about 1-1.5 hours/day.     Appetite good. She has taken ativan 3-4 times in past month for nausea. No vomiting. No abdominal pain. She has had problems with constipation but metamucil was managing this until recently. Now having to take miralax 2-3 times/week. Last week some blood on toilet paper for a few days but none recently. Denies any chace-rectal pain. Denies any history of rectal bleeding or hemorrhoids. Stool is not hard.     She has some puffiness of eyelids that has improved since starting levothyroxine  No problems with urination.   She feels her joints in general have been more sore--fingers, wrist. She is taking tylenol prn. No redness or swelling of any joint. Able to function.     Has chronic cough, non-productive, sometimes exercise-induced. Cough has been going on since May, but she feels recently less frequent. She had a flu shot this year. She had soreness of injection site of shingrix shot, so would like to post-pone PNA shot to December.     Review of Systems:  Patient denies any of the following except if noted above: fevers, chills, vision or hearing changes, chest pain, dyspnea, urinary concerns, rashes.    Current Outpatient Prescriptions   Medication Sig Dispense Refill     acetaminophen (TYLENOL ARTHRITIS PAIN) 650 MG CR tablet Take 650 mg by mouth every 6 hours as needed for mild pain or fever       benzonatate (TESSALON) 100 MG capsule Take 1 capsule (100 mg) by mouth 2 times daily as needed for cough 60 capsule 3     calcium carbonate (OS-JANAY 500 MG [place]. [place]) 1250 MG tablet Take 1 tablet by mouth every evening        DIPHENHYDRAMINE HCL PO Take 25 mg by mouth nightly as needed       guaiFENesin (MUCINEX) 600 MG 12 hr tablet Take 1,200 mg by mouth 2 times daily       levothyroxine (SYNTHROID/LEVOTHROID) 100 MCG tablet Take 1 tablet (100 mcg) by mouth daily 30 tablet 2     LORazepam (ATIVAN) 0.5 MG tablet Take 1 tablet (0.5 mg) by mouth every 4 hours as  "needed (Anxiety, Nausea/Vomiting or Sleep) 30 tablet 3     MAGNESIUM PO Take by mouth At Bedtime       prochlorperazine (COMPAZINE) 10 MG tablet Take 1 tablet (10 mg) by mouth every 6 hours as needed (Nausea/Vomiting) 30 tablet 3     Psyllium (METAMUCIL PO) Take 1 packet by mouth       VITAMIN D, CHOLECALCIFEROL, PO Take by mouth every evening          Physical Examination:  General: The patient is a pleasant female in no acute distress.  /85 (BP Location: Right arm, Patient Position: Sitting, Cuff Size: Adult Large)  Pulse 87  Temp 99.4  F (37.4  C) (Oral)  Resp 18  Ht 1.676 m (5' 5.98\")  Wt 93.9 kg (207 lb)  SpO2 97%  BMI 33.43 kg/m2  Wt Readings from Last 10 Encounters:   11/15/18 93.9 kg (207 lb)   10/18/18 93.2 kg (205 lb 6.4 oz)   09/20/18 92.9 kg (204 lb 11.2 oz)   08/23/18 93.3 kg (205 lb 11.2 oz)   08/09/18 91.4 kg (201 lb 6.4 oz)   07/26/18 91.7 kg (202 lb 3.2 oz)   07/12/18 90.2 kg (198 lb 12.8 oz)   06/28/18 91.6 kg (202 lb)   06/28/18 91.6 kg (201 lb 15.1 oz)   06/14/18 93.3 kg (205 lb 9.6 oz)     HEENT: EOMI, PERRL. Sclerae are anicteric. Oral mucosa is pink and moist with no lesions or thrush.   Lymph: Neck is supple with no lymphadenopathy in the cervical or supraclavicular areas.   Heart: Regular rate and rhythm.   Lungs: Clear to auscultation bilaterally.   Abdomen: Bowel sounds present, soft, nontender with no palpable hepatosplenomegaly or masses. No visible hemorrhoids on exam. No erythema, induration or fissures in chace-rectal area. No visible source of bleeding  Extremities: No lower extremity edema noted bilaterally.   Neuro: Cranial nerves II through XII are grossly intact.  Skin: No rashes, petechiae, or bruising noted on exposed skin.    Laboratory Data:  Results for ROXANNA ASTUDILLO (MRN [id number]) as of 11/15/2018 12:14   11/15/2018 10:05   Sodium 132 (L)   Potassium 4.2   Chloride 102   Carbon Dioxide 23   Urea Nitrogen 19   Creatinine 0.94   GFR Estimate 61   GFR Estimate " If Black 74   Calcium 8.2 (L)   Anion Gap 7   Albumin 3.3 (L)   Protein Total 6.9   Bilirubin Total 0.4   Alkaline Phosphatase 87   ALT 20   AST 13   T4 Free 1.09   TSH 24.67 (H)   Glucose 93   WBC 7.7   Hemoglobin 13.2   Hematocrit 40.3   Platelet Count 299   RBC Count 4.44   MCV 91   MCH 29.7   MCHC 32.8   RDW 13.7   Diff Method Automated Method   % Neutrophils 71.9   % Lymphocytes 17.5   % Monocytes 6.2   % Eosinophils 3.2   % Basophils 0.8   % Immature Granulocytes 0.4   Nucleated RBCs 0   Absolute Neutrophil 5.5   Absolute Lymphocytes 1.4   Absolute Monocytes 0.5   Absolute Eosinophils 0.3   Absolute Basophils 0.1   Abs Immature Granulocytes 0.0   Absolute Nucleated RBC 0.0       Results for orders placed or performed in visit on 11/15/18 (from the past 24 hour(s))   CT Chest/Abdomen/Pelvis w Contrast    Narrative    EXAMINATION: CT CHEST/ABDOMEN/PELVIS W CONTRAST, 11/15/2018 9:20 AM    TECHNIQUE:  Helical CT images from the lung apices through the  symphysis pubis were obtained with contrast.  Coronal and sagittal  reformatted images were generated at a workstation for further  assessment.    CONTRAST:  126 ml Isovue 370.    COMPARISON: CT chest abdomen and pelvis 8/9/2018    HISTORY: Melanoma of anus (H)    FINDINGS:    Lines and tubes: None.    Mediastinum/Neck Base: Partially visualized thyroid gland is  unremarkable. Central tracheobronchial tree is patent. Heart size is  normal. No pericardial effusion.  Normal thoracic vasculature. No  thoracic lymphadenopathy.   Lungs: No consolidation. No pleural effusion or pneumothorax.     Unchanged sub-6 mm pulmonary pulmonary nodule, for example:  - 4 mm nodule in subpleural right lower lobe (series 10, image 115).  - 4 mm nodule in the lateral subpleural left lower lobe (series 10,  image 122).  No new or enlarging pulmonary nodule.    Abdomen and pelvis:    Liver: Unchanged fluid density hepatic lesions in segment 6 and  segment 8/4A. No new or enlarging liver  lesions. Portal veins appear  patent. No biliary tree dilation.  Gallbladder: No gallstones. No evidence of acute cholecystitis.  Spleen: Normal size.  Pancreas: No suspicious pancreatic lesions. The pancreatic duct is not  dilated.  Adrenal glands: No adrenal nodules.   Kidneys: No hydronephrosis or obstructing renal stones. A few  subcentimeter hypodense lesions are present, too small to characterize  by CT.   Bladder / Pelvic organs: Unremarkable.  Bowel: No bowel wall thickening. No dilated loops of bowel. Sliding  hiatal hernia.   Lymph nodes: No retroperitoneal, mesenteric, or pelvic  lymphadenopathy.  Peritoneum / Retroperitoneum: No free fluid or air within the abdomen.  Vessels: No infrarenal aortic aneurysm.     Bones and soft tissues: Degenerative changes of the spine. Stable  sclerotic lesion in L2 vertebral body (series 8, image 84). Unchanged  sclerotic foci in the right femur likely representing bone island.  Post surgical changes in the left inguinal region.      Impression    IMPRESSION: In this patient with history of perianal melanoma, the  current scan shows:  1. No convincing evidence for metastatic disease in the abdomen and  pelvis.  2. Stable pulmonary nodules.     I have personally reviewed the examination and initial interpretation  and I agree with the findings.    MARGOTH SOTO MD         Assessment and Plan:    Resected Stage IIIC nodular melanoma, pT4b pN2b, perianal primary  She is a 62 year old woman with resected perianal melanoma. She is on adjuvant nivolumab. CT-CAP today with no convincing evidence for metastatic disease in the abdomen and pelvis, stable pulmonary nodules. She does have some new recent rectal bleeding, but nothing visible on exam today to explain. She is on levothyroxine 100mcg daily started in September. TSH is trending down and T4 WNL. She reports worsening fatigue and labs are notable for new hyponatremia with Na 132.   --Will check ACTH and cortisol to  r/o adrenal insufficiency as cause of worsening fatigue  --Per Dr. Valentine, ok to proceed with C7 before we have those results    Hyponatremia: Na 132 today, where previously has always been normal range.   --ACTH WNL and cortisol appropriate. Urine osm and urine Na checked. This could be secondary to hypothyroidism.     Hypothyroidism: likely 2/2 thyroiditis from Nivolumab. She is on Levothyroxine 100mcg but reports worsening fatigue, constipation. Na low and could be secondary to hypothyroidism.  --Will increase Levothyroxine to 112mcg daily    Rectal bleeding: unknown etiology. Nothing noted on CT scan. Physical exam without obvious cause. She has had more constipation recently and is on metamucil and miralax. She will let us know if this recurs.   --Will send IB to Dr. Wright to see if further evaluation is recommended      Racheal Pelayo PA-C  [facility]  [address] [place] [address] [phone]

## 2018-11-15 NOTE — NURSING NOTE
"Oncology Rooming Note    November 15, 2018 12:19 PM   Khloe Tamez is a 62 year old female who presents for:    Chief Complaint   Patient presents with     Oncology Clinic Visit     Return for Melanoma , labs, Tx     Initial Vitals: /85 (BP Location: Right arm, Patient Position: Sitting, Cuff Size: Adult Large)  Pulse 87  Temp 99.4  F (37.4  C) (Oral)  Resp 18  Ht 1.676 m (5' 5.98\")  Wt 93.9 kg (207 lb)  SpO2 97%  BMI 33.43 kg/m2 Estimated body mass index is 33.43 kg/(m^2) as calculated from the following:    Height as of this encounter: 1.676 m (5' 5.98\").    Weight as of this encounter: 93.9 kg (207 lb). Body surface area is 2.09 meters squared.  No Pain (0) Comment: Data Unavailable   No LMP recorded. Patient is postmenopausal.  Allergies reviewed: Yes  Medications reviewed: Yes    Medications: MEDICATION REFILLS NEEDED TODAY. Provider was notified.  Pharmacy name entered into [facility]:    [facility] [id number] [place] [address]    Clinical concerns: Refills needed  Tatsumi  was notified.     7 minutes for nursing intake (face to face time)     Dipika Marion MA              "

## 2018-11-15 NOTE — PROGRESS NOTES
Infusion Nursing Note:  Khloe Tamez presents today for Cycle 7 Day 1 Nivolumab.    Patient seen by provider today: Yes: Racheal KATE   present during visit today: Not Applicable.      Intravenous Access:  Peripheral IV placed in CT this Am.  Has good blood return.    Treatment Conditions:  Lab Results   Component Value Date    HGB 13.2 11/15/2018     Lab Results   Component Value Date    WBC 7.7 11/15/2018      Lab Results   Component Value Date    ANEU 5.5 11/15/2018     Lab Results   Component Value Date     11/15/2018      Lab Results   Component Value Date     11/15/2018                   Lab Results   Component Value Date    POTASSIUM 4.2 11/15/2018           No results found for: MAG         Lab Results   Component Value Date    CR 0.94 11/15/2018                   Lab Results   Component Value Date    JANAY 8.2 11/15/2018                Lab Results   Component Value Date    BILITOTAL 0.4 11/15/2018           Lab Results   Component Value Date    ALBUMIN 3.3 11/15/2018                    Lab Results   Component Value Date    ALT 20 11/15/2018           Lab Results   Component Value Date    AST 13 11/15/2018     TSH   Date Value Ref Range Status   11/15/2018 24.67 (H) 0.40 - 4.00 mU/L Final     Results reviewed, labs MET treatment parameters, ok to proceed with treatment.      Post Infusion Assessment:  Patient tolerated infusion without incident.  Blood return noted pre and post infusion.  Site patent and intact, free from redness, edema or discomfort.  No evidence of extravasations.  Access discontinued per protocol.    Discharge Plan:   Patient declined prescription refills.  AVS to patient via [employer].  Patient will return 12/13/18 labs, chemo for next appointment.   Patient discharged in stable condition accompanied by: .  Departure Mode: Ambulatory.  Face to Face time: 0.    Karen Dan RN

## 2018-11-15 NOTE — NURSING NOTE
Chief Complaint   Patient presents with     Blood Draw     Labs only     Vitals done, labs drawn by venipuncture.  See doc flow sheets for details.  Dione Gaona CMA

## 2018-11-15 NOTE — PATIENT INSTRUCTIONS
[facility] Main Line: [phone]    Call triage nurse with chills and/or temperature greater than or equal to 100.4, uncontrolled nausea/vomiting, diarrhea, constipation, dizziness, shortness of breath, chest pain, bleeding, unexplained bruising, or any new/concerning symptoms, questions/concerns.   If you are having any concerning symptoms or wish to speak to a provider before your next infusion visit, please call your care coordinator or triage to notify them so we can adequately serve you.   Triage Nurse Line: [phone]    If after hours, weekends, or holidays [phone]               November 2018 Sunday Monday Tuesday Wednesday Thursday Friday Saturday                       1     2     3       4     5     6     7     8     9     10       11     12     13     14     15     CT CHEST ABDOMEN PELVIS WWO    9:20 AM   (20 min.)   [facility]CT1   [facility] CT     [facility] DRAW    9:30 AM   (15 min.)    [facility]   [facility] Draw     [facility] RETURN   11:55 AM   (50 min.)   Racheal Pelayo PA   [facility]     [facility] ONC INFUSION 60    1:30 PM   (60 min.)    ONCOLOGY INFUSION   [facility] 16     17       18     19     20     21     22     23     24       25     26     27     28     29     30 December 2018 Sunday Monday Tuesday Wednesday Thursday Friday Saturday                                 1       2     3     4     5     6     7     8       9     10     11     12     13     [facility] DRAW    1:30 PM   (15 min.)    [facility] DRAW   [facility] Lab Draw     [facility] ONC INFUSION 60    2:00 PM   (60 min.)    ONCOLOGY INFUSION   [facility] 14     15       16     17     18     19     20     21     22       23     24     25     26     27     28     29  Happy Birthday!       30     31                                           Lab Results:  Recent Results (from the past 12  hour(s))   Comprehensive metabolic panel    Collection Time: 11/15/18 10:05 AM   Result Value Ref Range    Sodium 132 (L) 133 - 144 mmol/L    Potassium 4.2 3.4 - 5.3 mmol/L    Chloride 102 94 - 109 mmol/L    Carbon Dioxide 23 20 - 32 mmol/L    Anion Gap 7 3 - 14 mmol/L    Glucose 93 70 - 99 mg/dL    Urea Nitrogen 19 7 - 30 mg/dL    Creatinine 0.94 0.52 - 1.04 mg/dL    GFR Estimate 61 >60 mL/min/1.7m2    GFR Estimate If Black 74 >60 mL/min/1.7m2    Calcium 8.2 (L) 8.5 - 10.1 mg/dL    Bilirubin Total 0.4 0.2 - 1.3 mg/dL    Albumin 3.3 (L) 3.4 - 5.0 g/dL    Protein Total 6.9 6.8 - 8.8 g/dL    Alkaline Phosphatase 87 40 - 150 U/L    ALT 20 0 - 50 U/L    AST 13 0 - 45 U/L   TSH with free T4 reflex    Collection Time: 11/15/18 10:05 AM   Result Value Ref Range    TSH 24.67 (H) 0.40 - 4.00 mU/L   CBC with platelets differential    Collection Time: 11/15/18 10:05 AM   Result Value Ref Range    WBC 7.7 4.0 - 11.0 10e9/L    RBC Count 4.44 3.8 - 5.2 10e12/L    Hemoglobin 13.2 11.7 - 15.7 g/dL    Hematocrit 40.3 35.0 - 47.0 %    MCV 91 78 - 100 fl    MCH 29.7 26.5 - 33.0 pg    MCHC 32.8 31.5 - 36.5 g/dL    RDW 13.7 10.0 - 15.0 %    Platelet Count 299 150 - 450 10e9/L    Diff Method Automated Method     % Neutrophils 71.9 %    % Lymphocytes 17.5 %    % Monocytes 6.2 %    % Eosinophils 3.2 %    % Basophils 0.8 %    % Immature Granulocytes 0.4 %    Nucleated RBCs 0 0 /100    Absolute Neutrophil 5.5 1.6 - 8.3 10e9/L    Absolute Lymphocytes 1.4 0.8 - 5.3 10e9/L    Absolute Monocytes 0.5 0.0 - 1.3 10e9/L    Absolute Eosinophils 0.3 0.0 - 0.7 10e9/L    Absolute Basophils 0.1 0.0 - 0.2 10e9/L    Abs Immature Granulocytes 0.0 0 - 0.4 10e9/L    Absolute Nucleated RBC 0.0    T4 free    Collection Time: 11/15/18 10:05 AM   Result Value Ref Range    T4 Free 1.09 0.76 - 1.46 ng/dL

## 2018-11-15 NOTE — DISCHARGE INSTRUCTIONS

## 2018-11-15 NOTE — LETTER
11/15/2018      RE: Khloe Tamez  [address] [facility] [address]       Oncology/Hematology Visit Note  Nov 15, 2018    Reason for Visit: follow up of perianal melanoma    History of Present Illness: Khloe Tamez is a 62 year old female with perianal melanoma. Her oncologic history is as follows:     1. 3/27/18 patient had perianal lesion biopsied in [place] ([id number]). This showed malignant melanoma, positive for HMB45 and S100, and negative for CD45 and pancytokeratin. Breslow depth at least 3.5 mm, non-ulcerated, with 5 mitoses/mm2, TILs present with deep and peripheral margins positive; pT3a.  2. 3/29/18, PET-CT showed a perianal FDG avid lesion (SUV max 10.8) and a superficial left inguinal lymph node measuring 0.9 cm (SUV max 4.1).  3. 4/13/18, she has wide local excision of a 2 x 2.5 cm perianal melanoma with anaplastic closure, under Dr. Wright. Pathology showed a nodular melanoma, Breslow depth 6.0 mm to mark's level IV, there were more than 10 mitoses per mm2, with ulceration, and lymphovascular invasion present. No perineural invasion or microsatellitosis. Tumor infiltrating lymphocytes present, non-brisk. AJCC microstage: pT4b.  4. 4/26/18, she has MRI brain, which is negative for metastatic disease.  5. 5/2/18, she returns to OR under Dr. Shah for left femoral sentinel lymph node biopsy, with 2 (of 3) involved lymph nodes. IHC positive for Melan-A, tyrosinase, and HMB-45. No definite extranodal extension. KPS 90. PD-1 0%  6. 5/31/18, started on adjuvant Nivolumab  7. 6/14/18 PETCT (for baseline purposes after surgery) with possible pulmonary and right gastrocnemius muscle metastases     She comes in today for routine follow up prior to cycle 7 Nivolumab.    Interval History:  Khloe is here with her . She has been feeling fatigued. She was initially started on 125mcg of levthyroxine but she then felt hot and couldn't sleep. The dose was reduced to 100 mcg in September. Over  the past 1 month, she feels she is having much more fatigue. She sleeps about 10 hours/night and takes nap for about 1-1.5 hours/day.     Appetite good. She has taken ativan 3-4 times in past month for nausea. No vomiting. No abdominal pain. She has had problems with constipation but metamucil was managing this until recently. Now having to take miralax 2-3 times/week. Last week some blood on toilet paper for a few days but none recently. Denies any chace-rectal pain. Denies any history of rectal bleeding or hemorrhoids. Stool is not hard.     She has some puffiness of eyelids that has improved since starting levothyroxine  No problems with urination.   She feels her joints in general have been more sore--fingers, wrist. She is taking tylenol prn. No redness or swelling of any joint. Able to function.     Has chronic cough, non-productive, sometimes exercise-induced. Cough has been going on since May, but she feels recently less frequent. She had a flu shot this year. She had soreness of injection site of shingrix shot, so would like to post-pone PNA shot to December.     Review of Systems:  Patient denies any of the following except if noted above: fevers, chills, vision or hearing changes, chest pain, dyspnea, urinary concerns, rashes.    Current Outpatient Prescriptions   Medication Sig Dispense Refill     acetaminophen (TYLENOL ARTHRITIS PAIN) 650 MG CR tablet Take 650 mg by mouth every 6 hours as needed for mild pain or fever       benzonatate (TESSALON) 100 MG capsule Take 1 capsule (100 mg) by mouth 2 times daily as needed for cough 60 capsule 3     calcium carbonate (OS-JANAY 500 MG [place]. [place]) 1250 MG tablet Take 1 tablet by mouth every evening        DIPHENHYDRAMINE HCL PO Take 25 mg by mouth nightly as needed       guaiFENesin (MUCINEX) 600 MG 12 hr tablet Take 1,200 mg by mouth 2 times daily       levothyroxine (SYNTHROID/LEVOTHROID) 100 MCG tablet Take 1 tablet (100 mcg) by mouth daily 30 tablet 2      "LORazepam (ATIVAN) 0.5 MG tablet Take 1 tablet (0.5 mg) by mouth every 4 hours as needed (Anxiety, Nausea/Vomiting or Sleep) 30 tablet 3     MAGNESIUM PO Take by mouth At Bedtime       prochlorperazine (COMPAZINE) 10 MG tablet Take 1 tablet (10 mg) by mouth every 6 hours as needed (Nausea/Vomiting) 30 tablet 3     Psyllium (METAMUCIL PO) Take 1 packet by mouth       VITAMIN D, CHOLECALCIFEROL, PO Take by mouth every evening          Physical Examination:  General: The patient is a pleasant female in no acute distress.  /85 (BP Location: Right arm, Patient Position: Sitting, Cuff Size: Adult Large)  Pulse 87  Temp 99.4  F (37.4  C) (Oral)  Resp 18  Ht 1.676 m (5' 5.98\")  Wt 93.9 kg (207 lb)  SpO2 97%  BMI 33.43 kg/m2  Wt Readings from Last 10 Encounters:   11/15/18 93.9 kg (207 lb)   10/18/18 93.2 kg (205 lb 6.4 oz)   09/20/18 92.9 kg (204 lb 11.2 oz)   08/23/18 93.3 kg (205 lb 11.2 oz)   08/09/18 91.4 kg (201 lb 6.4 oz)   07/26/18 91.7 kg (202 lb 3.2 oz)   07/12/18 90.2 kg (198 lb 12.8 oz)   06/28/18 91.6 kg (202 lb)   06/28/18 91.6 kg (201 lb 15.1 oz)   06/14/18 93.3 kg (205 lb 9.6 oz)     HEENT: EOMI, PERRL. Sclerae are anicteric. Oral mucosa is pink and moist with no lesions or thrush.   Lymph: Neck is supple with no lymphadenopathy in the cervical or supraclavicular areas.   Heart: Regular rate and rhythm.   Lungs: Clear to auscultation bilaterally.   Abdomen: Bowel sounds present, soft, nontender with no palpable hepatosplenomegaly or masses. No visible hemorrhoids on exam. No erythema, induration or fissures in chace-rectal area. No visible source of bleeding  Extremities: No lower extremity edema noted bilaterally.   Neuro: Cranial nerves II through XII are grossly intact.  Skin: No rashes, petechiae, or bruising noted on exposed skin.    Laboratory Data:  Results for ROXANNA ASTUDILLO (MRN [id number]) as of 11/15/2018 12:14   11/15/2018 10:05   Sodium 132 (L)   Potassium 4.2   Chloride 102 "   Carbon Dioxide 23   Urea Nitrogen 19   Creatinine 0.94   GFR Estimate 61   GFR Estimate If Black 74   Calcium 8.2 (L)   Anion Gap 7   Albumin 3.3 (L)   Protein Total 6.9   Bilirubin Total 0.4   Alkaline Phosphatase 87   ALT 20   AST 13   T4 Free 1.09   TSH 24.67 (H)   Glucose 93   WBC 7.7   Hemoglobin 13.2   Hematocrit 40.3   Platelet Count 299   RBC Count 4.44   MCV 91   MCH 29.7   MCHC 32.8   RDW 13.7   Diff Method Automated Method   % Neutrophils 71.9   % Lymphocytes 17.5   % Monocytes 6.2   % Eosinophils 3.2   % Basophils 0.8   % Immature Granulocytes 0.4   Nucleated RBCs 0   Absolute Neutrophil 5.5   Absolute Lymphocytes 1.4   Absolute Monocytes 0.5   Absolute Eosinophils 0.3   Absolute Basophils 0.1   Abs Immature Granulocytes 0.0   Absolute Nucleated RBC 0.0       Results for orders placed or performed in visit on 11/15/18 (from the past 24 hour(s))   CT Chest/Abdomen/Pelvis w Contrast    Narrative    EXAMINATION: CT CHEST/ABDOMEN/PELVIS W CONTRAST, 11/15/2018 9:20 AM    TECHNIQUE:  Helical CT images from the lung apices through the  symphysis pubis were obtained with contrast.  Coronal and sagittal  reformatted images were generated at a workstation for further  assessment.    CONTRAST:  126 ml Isovue 370.    COMPARISON: CT chest abdomen and pelvis 8/9/2018    HISTORY: Melanoma of anus (H)    FINDINGS:    Lines and tubes: None.    Mediastinum/Neck Base: Partially visualized thyroid gland is  unremarkable. Central tracheobronchial tree is patent. Heart size is  normal. No pericardial effusion.  Normal thoracic vasculature. No  thoracic lymphadenopathy.   Lungs: No consolidation. No pleural effusion or pneumothorax.     Unchanged sub-6 mm pulmonary pulmonary nodule, for example:  - 4 mm nodule in subpleural right lower lobe (series 10, image 115).  - 4 mm nodule in the lateral subpleural left lower lobe (series 10,  image 122).  No new or enlarging pulmonary nodule.    Abdomen and pelvis:    Liver: Unchanged  fluid density hepatic lesions in segment 6 and  segment 8/4A. No new or enlarging liver lesions. Portal veins appear  patent. No biliary tree dilation.  Gallbladder: No gallstones. No evidence of acute cholecystitis.  Spleen: Normal size.  Pancreas: No suspicious pancreatic lesions. The pancreatic duct is not  dilated.  Adrenal glands: No adrenal nodules.   Kidneys: No hydronephrosis or obstructing renal stones. A few  subcentimeter hypodense lesions are present, too small to characterize  by CT.   Bladder / Pelvic organs: Unremarkable.  Bowel: No bowel wall thickening. No dilated loops of bowel. Sliding  hiatal hernia.   Lymph nodes: No retroperitoneal, mesenteric, or pelvic  lymphadenopathy.  Peritoneum / Retroperitoneum: No free fluid or air within the abdomen.  Vessels: No infrarenal aortic aneurysm.     Bones and soft tissues: Degenerative changes of the spine. Stable  sclerotic lesion in L2 vertebral body (series 8, image 84). Unchanged  sclerotic foci in the right femur likely representing bone island.  Post surgical changes in the left inguinal region.      Impression    IMPRESSION: In this patient with history of perianal melanoma, the  current scan shows:  1. No convincing evidence for metastatic disease in the abdomen and  pelvis.  2. Stable pulmonary nodules.     I have personally reviewed the examination and initial interpretation  and I agree with the findings.    MARGOTH SOTO MD         Assessment and Plan:    Resected Stage IIIC nodular melanoma, pT4b pN2b, perianal primary  She is a 62 year old woman with resected perianal melanoma. She is on adjuvant nivolumab. CT-CAP today with no convincing evidence for metastatic disease in the abdomen and pelvis, stable pulmonary nodules. She does have some new recent rectal bleeding, but nothing visible on exam today to explain. She is on levothyroxine 100mcg daily started in September. TSH is trending down and T4 WNL. She reports worsening fatigue and  labs are notable for new hyponatremia with Na 132.   --Will check ACTH and cortisol to r/o adrenal insufficiency as cause of worsening fatigue  --Per Dr. Valentine, ok to proceed with C7 before we have those results    Hyponatremia: Na 132 today, where previously has always been normal range.   --ACTH WNL and cortisol appropriate. Urine osm and urine Na checked. This could be secondary to hypothyroidism.     Hypothyroidism: likely 2/2 thyroiditis from Nivolumab. She is on Levothyroxine 100mcg but reports worsening fatigue, constipation. Na low and could be secondary to hypothyroidism.  --Will increase Levothyroxine to 112mcg daily    Rectal bleeding: unknown etiology. Nothing noted on CT scan. Physical exam without obvious cause. She has had more constipation recently and is on metamucil and miralax. She will let us know if this recurs.   --Will send IB to Dr. Wright to see if further evaluation is recommended      Racheal Pelayo PA-C  [facility]  [address] [place] [address] [phone]

## 2018-11-15 NOTE — MR AVS SNAPSHOT
After Visit Summary   11/15/2018    Khloe Tamez    MRN: [id number]           Patient Information     Date Of Birth          1955        Visit Information        Provider Department      11/15/2018 12:10 PM Racheal Pelayo PA [facility]        Today's Diagnoses     Melanoma of anus (H)    -  1    Hyponatremia        Fatigue, unspecified type        Drug-induced hypothyroidism           Follow-ups after your visit        Your next 10 appointments already scheduled     Dec 13, 2018  1:30 PM CST   [facility] Draw with [facility] DRAW   [facility])    [address] [place] [address]  [address]  [facility] [address]   [phone]            Dec 13, 2018  2:00 PM CST   Infusion 62 with UC ONCOLOGY INFUSION, UC 15 ATC   [facility] ([facility])    [address] [place] [address]  [address]  [facility] [address]   [phone]            Alden 10, 2019  9:30 AM CST   [facility] Lab Draw with [facility] LAB DRAW   [facility] ([facility])    [address] [place] [address]  [address]  [facility] [address]   [phone]            Alden 10, 2019 10:00 AM CST   Infusion 62 with UC ONCOLOGY INFUSION, UC 29 ATC   [facility] ([facility])    [address] [place] [address]  [address]  [facility] [address]   [phone]              Who to contact     If you have questions or need follow up information about today's clinic visit or your schedule please contact [facility] directly at [phone].  Normal or non-critical lab and imaging results will be communicated to you by MyChart, letter or phone within 4 business days after the clinic has received the results. If you do not hear from us within 7 days, please contact the clinic through MyChart or phone. If you have a critical or abnormal lab result, we will  "notify you by phone as soon as possible.  Submit refill requests through [employer] or call your pharmacy and they will forward the refill request to us. Please allow 3 business days for your refill to be completed.          Additional Information About Your Visit        [employer] Information     [employer] gives you secure access to your electronic health record. If you see a primary care provider, you can also send messages to your care team and make appointments. If you have questions, please call your primary care clinic.  If you do not have a primary care provider, please call [phone] and they will assist you.        Care EveryWhere ID     This is your Care EveryWhere ID. This could be used by other organizations to access your [place] medical records  [id number]        Your Vitals Were     Pulse Temperature Respirations Height Pulse Oximetry BMI (Body Mass Index)    87 99.4  F (37.4  C) (Oral) 18 1.676 m (5' 5.98\") 97% 33.43 kg/m2       Blood Pressure from Last 3 Encounters:   11/15/18 135/85   10/18/18 140/90   09/20/18 148/83    Weight from Last 3 Encounters:   11/15/18 93.9 kg (207 lb)   10/18/18 93.2 kg (205 lb 6.4 oz)   09/20/18 92.9 kg (204 lb 11.2 oz)              We Performed the Following     Adrenal corticotropin     Adrenal corticotropin     Cortisol     Osmolality urine     Osmolality     Sodium random urine          Today's Medication Changes          These changes are accurate as of 11/15/18 11:59 PM.  If you have any questions, ask your nurse or doctor.               These medicines have changed or have updated prescriptions.        Dose/Directions    levothyroxine 112 MCG tablet   Commonly known as:  SYNTHROID/LEVOTHROID   This may have changed:    - medication strength  - how much to take   Used for:  Drug-induced hypothyroidism   Changed by:  Racheal Pelayo, PA        Dose:  112 mcg   Take 1 tablet (112 mcg) by mouth daily   Quantity:  30 tablet   Refills:  1            Where to get your " medicines      These medications were sent to [place] [id number] [place] [address] [place] [address]     Phone:  [phone]     levothyroxine 112 MCG tablet         Some of these will need a paper prescription and others can be bought over the counter.  Ask your nurse if you have questions.     Bring a paper prescription for each of these medications     LORazepam 0.5 MG tablet                Primary Care Provider Office Phone # Fax #    Aide Robertson [phone]       [facility] [address] [address]        Equal Access to Services     [facility] : Hadii katelyn Cox, wamarcida paulina, qaybta kaalmada marco, silvia guzmán. So [facility] [phone].    ATENCIÓN: Si habla español, tiene a tim disposición servicios gratuitos de asistencia lingüística. [facility] [phone].    We comply with applicable federal civil rights laws and [place] laws. We do not discriminate on the basis of race, color, national origin, age, disability, sex, sexual orientation, or gender identity.            Thank you!     Thank you for choosing [facility]  for your care. Our goal is always to provide you with excellent care. Hearing back from our patients is one way we can continue to improve our services. Please take a few minutes to complete the written survey that you may receive in the mail after your visit with us. Thank you!             Your Updated Medication List - Protect others around you: Learn how to safely use, store and throw away your medicines at www.disposemymeds.org.          This list is accurate as of 11/15/18 11:59 PM.  Always use your most recent med list.                   Brand Name Dispense Instructions for use Diagnosis    benzonatate 100 MG capsule    TESSALON    60 capsule    Take 1 capsule (100 mg) by mouth 2 times daily as needed for cough    Melanoma of  anus (H)       calcium carbonate 500 mg (elemental) 500 MG tablet    OS-JANAY     Take 1 tablet by mouth every evening        DIPHENHYDRAMINE HCL PO      Take 25 mg by mouth nightly as needed        FLUZONE QUADRIVALENT injection   Generic drug:  influenza quadrivalent (w/PRESERVATIVE) vacc age 3 yrs and older       Melanoma of anus (H)       guaiFENesin 600 MG 12 hr tablet    MUCINEX     Take 1,200 mg by mouth 2 times daily        levothyroxine 112 MCG tablet    SYNTHROID/LEVOTHROID    30 tablet    Take 1 tablet (112 mcg) by mouth daily    Drug-induced hypothyroidism       LORazepam 0.5 MG tablet    ATIVAN    30 tablet    Take 1 tablet (0.5 mg) by mouth every 4 hours as needed (Anxiety, Nausea/Vomiting or Sleep)    Melanoma of anus (H)       MAGNESIUM PO      Take by mouth At Bedtime        METAMUCIL PO      Take 1 packet by mouth        prochlorperazine 10 MG tablet    COMPAZINE    30 tablet    Take 1 tablet (10 mg) by mouth every 6 hours as needed (Nausea/Vomiting)    Melanoma of anus (H)       TYLENOL ARTHRITIS PAIN 650 MG CR tablet   Generic drug:  acetaminophen      Take 650 mg by mouth every 6 hours as needed for mild pain or fever        VITAMIN D (CHOLECALCIFEROL) PO      Take by mouth every evening

## 2018-11-19 NOTE — TELEPHONE ENCOUNTER
Dr. Wright requested patient see himself or India Smith NP for rectal bleeding (anoscopy).  Patient is already scheduled for January.  Left message for patient inquiring if she'd like to be seen sooner by India Smith NP.  Provided my direct number to discuss.

## 2018-11-20 NOTE — TELEPHONE ENCOUNTER
Patient left a message stating she is scheduled to see India Smith NP 1/10/19, but will also be here 12/13/18, and wonders if appointment can be moved up?  Called patient and left a message informing her that India Smith NP does not have availability 12/13/18 but I could get her in sooner than January if she prefers.  Informed patient I have not rescheduled any appointments, but provided my direct number if she would like to reschedule her appointment.

## 2018-12-13 NOTE — PROGRESS NOTES
Infusion Nursing Note:  Khloe Tamez presents today for Cycle 8 Day 1 Opdivo.    Patient seen by provider today: No   present during visit today: Not Applicable.    Note: Patient presents to infusion today stating she feels well. She reports a cough, says that she has had it since beginning treatment and it has not changed. Denies any other symptoms and states she has no new concerns.    Prior to appointment, patient had requested a pneumonia vaccine to be administered during her infusion appointment. In basket sent to Dr. Valentine and Linda Sanchez, Care Coordinator. [facility] vaccine information sheet given to patient. Patient stated she had no additional questions. Prevnar-13 administered.      Patient requested refills be added for Synthroid. She stated she just filled the last available refill and will not have enough to get through until her next provider appointment. In basket sent to Racheal Pelayo PA-C and Linda Sanchez, Care Coordinator.     Intravenous Access:  Peripheral IV placed.    Treatment Conditions:  Lab Results   Component Value Date    HGB 13.6 12/13/2018     Lab Results   Component Value Date    WBC 8.5 12/13/2018      Lab Results   Component Value Date    ANEU 5.9 12/13/2018     Lab Results   Component Value Date     12/13/2018      Lab Results   Component Value Date     12/13/2018                   Lab Results   Component Value Date    POTASSIUM 3.9 12/13/2018           No results found for: MAG         Lab Results   Component Value Date    CR 0.92 12/13/2018                   Lab Results   Component Value Date    JANAY 8.5 12/13/2018                Lab Results   Component Value Date    BILITOTAL 0.4 12/13/2018           Lab Results   Component Value Date    ALBUMIN 3.5 12/13/2018                    Lab Results   Component Value Date    ALT 21 12/13/2018           Lab Results   Component Value Date    AST 14 12/13/2018       Results reviewed, labs MET treatment  parameters, ok to proceed with treatment.      Post Infusion Assessment:  Patient tolerated infusion without incident.  Patient tolerated injection without incident to Right Deltoid.  Blood return noted pre and post infusion.  Site patent and intact, free from redness, edema or discomfort.  No evidence of extravasations.  Access discontinued per protocol.    Discharge Plan:   Patient declined prescription refills.  Discharge instructions reviewed with: Patient.  Patient and/or family verbalized understanding of discharge instructions and all questions answered.  AVS to patient via [id number].  Patient will return 1/10/18 for next appointment.   Patient discharged in stable condition accompanied by: self and .  Departure Mode: Ambulatory.    Lilian Pedro RN

## 2018-12-13 NOTE — PATIENT INSTRUCTIONS
Contact Numbers    [facility] Main Line: [phone]  [facility] Triage and after hours / weekends / holidays:  [phone]      Please call the triage or after hours line if you experience a temperature greater than or equal to 100.5, shaking chills, have uncontrolled nausea, vomiting and/or diarrhea, dizziness, shortness of breath, chest pain, bleeding, unexplained bruising, or if you have any other new/concerning symptoms, questions or concerns.      If you are having any concerning symptoms or wish to speak to a provider before your next infusion visit, please call your care coordinator or triage to notify them so we can adequately serve you.     If you need a refill on a narcotic prescription or other medication, please call before your infusion appointment.         December 2018 Sunday Monday Tuesday Wednesday Thursday Friday Saturday                                 1       2     3     4     5     6     7     8       9     10     11     12     13    [facility]   1:30 PM   (15 min.)    [facility]   [facility]    [facility] ONC INFUSION 60   2:00 PM   (60 min.)    ONCOLOGY INFUSION   [facility] 14     15       16     17     18     19     20     21     22       23     24     25     26     27     28     29  Happy Birthday!       30 31 January 2019 Sunday Monday Tuesday Wednesday Thursday Friday Saturday             1     2     3     4     5       6     7     8     9     10    [facility] RETURN   8:15 AM   (30 min.)   India Hartman APRN CNP   [facility] Colon and Rectal Surgery    [facility]   9:30 AM   (15 min.)    [facility] DRAW   [facility] Draw    [facility] ONC INFUSION 60  10:00 AM   (60 min.)    ONCOLOGY INFUSION   [facility] 11     12       13     14     15     16     17     18     19       20     21     22     23     24     25     26       27     28     29     30      31                               Lab Results:  Recent Results (from the past 12 hour(s))   Comprehensive metabolic panel    Collection Time: 12/13/18  1:58 PM   Result Value Ref Range    Sodium 135 133 - 144 mmol/L    Potassium 3.9 3.4 - 5.3 mmol/L    Chloride 103 94 - 109 mmol/L    Carbon Dioxide 24 20 - 32 mmol/L    Anion Gap 8 3 - 14 mmol/L    Glucose 107 (H) 70 - 99 mg/dL    Urea Nitrogen 20 7 - 30 mg/dL    Creatinine 0.92 0.52 - 1.04 mg/dL    GFR Estimate 62 >60 mL/min/1.7m2    GFR Estimate If Black 75 >60 mL/min/1.7m2    Calcium 8.5 8.5 - 10.1 mg/dL    Bilirubin Total 0.4 0.2 - 1.3 mg/dL    Albumin 3.5 3.4 - 5.0 g/dL    Protein Total 7.3 6.8 - 8.8 g/dL    Alkaline Phosphatase 94 40 - 150 U/L    ALT 21 0 - 50 U/L    AST 14 0 - 45 U/L   TSH with free T4 reflex    Collection Time: 12/13/18  1:58 PM   Result Value Ref Range    TSH 23.60 (H) 0.40 - 4.00 mU/L   CBC with platelets differential    Collection Time: 12/13/18  1:58 PM   Result Value Ref Range    WBC 8.5 4.0 - 11.0 10e9/L    RBC Count 4.63 3.8 - 5.2 10e12/L    Hemoglobin 13.6 11.7 - 15.7 g/dL    Hematocrit 41.6 35.0 - 47.0 %    MCV 90 78 - 100 fl    MCH 29.4 26.5 - 33.0 pg    MCHC 32.7 31.5 - 36.5 g/dL    RDW 13.1 10.0 - 15.0 %    Platelet Count 288 150 - 450 10e9/L    Diff Method Automated Method     % Neutrophils 68.5 %    % Lymphocytes 18.4 %    % Monocytes 6.3 %    % Eosinophils 5.7 %    % Basophils 0.7 %    % Immature Granulocytes 0.4 %    Nucleated RBCs 0 0 /100    Absolute Neutrophil 5.9 1.6 - 8.3 10e9/L    Absolute Lymphocytes 1.6 0.8 - 5.3 10e9/L    Absolute Monocytes 0.5 0.0 - 1.3 10e9/L    Absolute Eosinophils 0.5 0.0 - 0.7 10e9/L    Absolute Basophils 0.1 0.0 - 0.2 10e9/L    Abs Immature Granulocytes 0.0 0 - 0.4 10e9/L    Absolute Nucleated RBC 0.0    T4 free    Collection Time: 12/13/18  1:58 PM   Result Value Ref Range    T4 Free 1.08 0.76 - 1.46 ng/dL

## 2019-01-01 ENCOUNTER — HOSPITAL ENCOUNTER (OUTPATIENT)
Dept: MRI IMAGING | Facility: CLINIC | Age: 64
Discharge: HOME | End: 2019-03-28
Attending: PSYCHIATRY & NEUROLOGY | Admitting: PSYCHIATRY & NEUROLOGY
Payer: COMMERCIAL

## 2019-01-01 ENCOUNTER — TELEPHONE (OUTPATIENT)
Dept: SURGERY | Facility: CLINIC | Age: 64
End: 2019-01-01

## 2019-01-01 ENCOUNTER — OFFICE VISIT (OUTPATIENT)
Dept: NEUROLOGY | Facility: CLINIC | Age: 64
End: 2019-01-01
Attending: PSYCHIATRY & NEUROLOGY
Payer: COMMERCIAL

## 2019-01-01 ENCOUNTER — ANESTHESIA (OUTPATIENT)
Dept: SURGERY | Facility: CLINIC | Age: 64
End: 2019-01-01
Payer: COMMERCIAL

## 2019-01-01 ENCOUNTER — OFFICE VISIT (OUTPATIENT)
Dept: FAMILY MEDICINE | Facility: CLINIC | Age: 64
End: 2019-01-01
Payer: COMMERCIAL

## 2019-01-01 ENCOUNTER — DOCUMENTATION ONLY (OUTPATIENT)
Dept: SURGERY | Facility: CLINIC | Age: 64
End: 2019-01-01

## 2019-01-01 ENCOUNTER — TELEPHONE (OUTPATIENT)
Dept: NEUROLOGY | Facility: CLINIC | Age: 64
End: 2019-01-01

## 2019-01-01 ENCOUNTER — DOCUMENTATION ONLY (OUTPATIENT)
Dept: CARE COORDINATION | Facility: CLINIC | Age: 64
End: 2019-01-01

## 2019-01-01 ENCOUNTER — OFFICE VISIT (OUTPATIENT)
Dept: RADIATION ONCOLOGY | Facility: CLINIC | Age: 64
End: 2019-01-01
Attending: INTERNAL MEDICINE
Payer: COMMERCIAL

## 2019-01-01 ENCOUNTER — OFFICE VISIT (OUTPATIENT)
Dept: NEUROLOGY | Facility: CLINIC | Age: 64
End: 2019-01-01
Attending: PHYSICIAN ASSISTANT
Payer: COMMERCIAL

## 2019-01-01 ENCOUNTER — ONCOLOGY VISIT (OUTPATIENT)
Dept: ONCOLOGY | Facility: CLINIC | Age: 64
End: 2019-01-01
Attending: INTERNAL MEDICINE
Payer: COMMERCIAL

## 2019-01-01 ENCOUNTER — HOSPITAL ENCOUNTER (OUTPATIENT)
Dept: MRI IMAGING | Facility: CLINIC | Age: 64
Discharge: HOME | End: 2019-02-11
Attending: INTERNAL MEDICINE | Admitting: INTERNAL MEDICINE
Payer: COMMERCIAL

## 2019-01-01 ENCOUNTER — CARE COORDINATION (OUTPATIENT)
Dept: SURGERY | Facility: CLINIC | Age: 64
End: 2019-01-01

## 2019-01-01 ENCOUNTER — OFFICE VISIT (OUTPATIENT)
Dept: SURGERY | Facility: CLINIC | Age: 64
End: 2019-01-01
Payer: COMMERCIAL

## 2019-01-01 ENCOUNTER — ALLIED HEALTH/NURSE VISIT (OUTPATIENT)
Dept: NEUROLOGY | Facility: CLINIC | Age: 64
End: 2019-01-01
Payer: COMMERCIAL

## 2019-01-01 ENCOUNTER — CARE COORDINATION (OUTPATIENT)
Dept: ONCOLOGY | Facility: CLINIC | Age: 64
End: 2019-01-01

## 2019-01-01 ENCOUNTER — APPOINTMENT (OUTPATIENT)
Dept: LAB | Facility: CLINIC | Age: 64
End: 2019-01-01
Attending: COLON & RECTAL SURGERY
Payer: COMMERCIAL

## 2019-01-01 ENCOUNTER — PATIENT OUTREACH (OUTPATIENT)
Dept: SURGERY | Facility: CLINIC | Age: 64
End: 2019-01-01

## 2019-01-01 ENCOUNTER — ANCILLARY PROCEDURE (OUTPATIENT)
Dept: PET IMAGING | Facility: CLINIC | Age: 64
End: 2019-01-01
Payer: COMMERCIAL

## 2019-01-01 ENCOUNTER — HOSPITAL ENCOUNTER (OUTPATIENT)
Dept: MRI IMAGING | Facility: CLINIC | Age: 64
Discharge: HOME | End: 2019-01-15
Attending: COLON & RECTAL SURGERY | Admitting: COLON & RECTAL SURGERY
Payer: COMMERCIAL

## 2019-01-01 ENCOUNTER — TELEPHONE (OUTPATIENT)
Dept: ONCOLOGY | Facility: CLINIC | Age: 64
End: 2019-01-01

## 2019-01-01 ENCOUNTER — PRE VISIT (OUTPATIENT)
Dept: RADIATION ONCOLOGY | Facility: CLINIC | Age: 64
End: 2019-01-01

## 2019-01-01 ENCOUNTER — HOSPITAL ENCOUNTER (OUTPATIENT)
Facility: CLINIC | Age: 64
Discharge: HOME | End: 2019-03-22
Attending: PSYCHIATRY & NEUROLOGY | Admitting: PSYCHIATRY & NEUROLOGY
Payer: COMMERCIAL

## 2019-01-01 ENCOUNTER — HOSPITAL ENCOUNTER (OUTPATIENT)
Dept: PET IMAGING | Facility: CLINIC | Age: 64
Setting detail: NUCLEAR MEDICINE
End: 2019-03-28
Attending: INTERNAL MEDICINE
Payer: COMMERCIAL

## 2019-01-01 ENCOUNTER — INFUSION THERAPY VISIT (OUTPATIENT)
Dept: ONCOLOGY | Facility: CLINIC | Age: 64
End: 2019-01-01
Attending: COLON & RECTAL SURGERY
Payer: COMMERCIAL

## 2019-01-01 ENCOUNTER — TRANSFERRED RECORDS (OUTPATIENT)
Dept: HEALTH INFORMATION MANAGEMENT | Facility: CLINIC | Age: 64
End: 2019-01-01

## 2019-01-01 ENCOUNTER — HOSPITAL ENCOUNTER (OUTPATIENT)
Dept: GENERAL RADIOLOGY | Facility: CLINIC | Age: 64
End: 2019-03-22
Attending: PSYCHIATRY & NEUROLOGY | Admitting: PSYCHIATRY & NEUROLOGY
Payer: COMMERCIAL

## 2019-01-01 ENCOUNTER — APPOINTMENT (OUTPATIENT)
Dept: LAB | Facility: CLINIC | Age: 64
End: 2019-01-01
Attending: INTERNAL MEDICINE
Payer: COMMERCIAL

## 2019-01-01 ENCOUNTER — HOSPITAL ENCOUNTER (OUTPATIENT)
Dept: MRI IMAGING | Facility: CLINIC | Age: 64
End: 2019-03-28
Attending: PSYCHIATRY & NEUROLOGY
Payer: COMMERCIAL

## 2019-01-01 ENCOUNTER — OFFICE VISIT (OUTPATIENT)
Dept: NEUROLOGY | Facility: CLINIC | Age: 64
End: 2019-01-01
Payer: COMMERCIAL

## 2019-01-01 ENCOUNTER — TELEPHONE (OUTPATIENT)
Dept: GASTROENTEROLOGY | Facility: CLINIC | Age: 64
End: 2019-01-01

## 2019-01-01 ENCOUNTER — PATIENT OUTREACH (OUTPATIENT)
Dept: ONCOLOGY | Facility: CLINIC | Age: 64
End: 2019-01-01

## 2019-01-01 ENCOUNTER — TELEPHONE (OUTPATIENT)
Dept: CARE COORDINATION | Facility: CLINIC | Age: 64
End: 2019-01-01

## 2019-01-01 ENCOUNTER — HOSPITAL ENCOUNTER (OUTPATIENT)
Facility: CLINIC | Age: 64
Discharge: HOME | End: 2019-01-25
Attending: COLON & RECTAL SURGERY | Admitting: COLON & RECTAL SURGERY
Payer: COMMERCIAL

## 2019-01-01 ENCOUNTER — ONCOLOGY VISIT (OUTPATIENT)
Dept: ONCOLOGY | Facility: CLINIC | Age: 64
End: 2019-01-01
Attending: PSYCHIATRY & NEUROLOGY
Payer: COMMERCIAL

## 2019-01-01 ENCOUNTER — ONCOLOGY VISIT (OUTPATIENT)
Dept: ONCOLOGY | Facility: CLINIC | Age: 64
End: 2019-01-01
Attending: PHYSICIAN ASSISTANT
Payer: COMMERCIAL

## 2019-01-01 ENCOUNTER — ANESTHESIA EVENT (OUTPATIENT)
Dept: SURGERY | Facility: CLINIC | Age: 64
End: 2019-01-01
Payer: COMMERCIAL

## 2019-01-01 VITALS
HEART RATE: 100 BPM | HEIGHT: 67 IN | TEMPERATURE: 98.3 F | OXYGEN SATURATION: 99 % | BODY MASS INDEX: 29.69 KG/M2 | SYSTOLIC BLOOD PRESSURE: 131 MMHG | DIASTOLIC BLOOD PRESSURE: 83 MMHG

## 2019-01-01 VITALS
RESPIRATION RATE: 16 BRPM | TEMPERATURE: 99.5 F | OXYGEN SATURATION: 96 % | HEART RATE: 106 BPM | WEIGHT: 176.06 LBS | BODY MASS INDEX: 27.63 KG/M2 | DIASTOLIC BLOOD PRESSURE: 92 MMHG | HEIGHT: 67 IN | SYSTOLIC BLOOD PRESSURE: 134 MMHG

## 2019-01-01 VITALS
WEIGHT: 198.9 LBS | SYSTOLIC BLOOD PRESSURE: 131 MMHG | BODY MASS INDEX: 31.97 KG/M2 | HEIGHT: 66 IN | OXYGEN SATURATION: 98 % | TEMPERATURE: 99.2 F | DIASTOLIC BLOOD PRESSURE: 82 MMHG | HEART RATE: 115 BPM

## 2019-01-01 VITALS
SYSTOLIC BLOOD PRESSURE: 131 MMHG | TEMPERATURE: 98.3 F | HEIGHT: 67 IN | BODY MASS INDEX: 29.69 KG/M2 | DIASTOLIC BLOOD PRESSURE: 83 MMHG | OXYGEN SATURATION: 99 % | HEART RATE: 100 BPM

## 2019-01-01 VITALS
RESPIRATION RATE: 18 BRPM | OXYGEN SATURATION: 98 % | TEMPERATURE: 99 F | BODY MASS INDEX: 29.44 KG/M2 | SYSTOLIC BLOOD PRESSURE: 104 MMHG | DIASTOLIC BLOOD PRESSURE: 59 MMHG | HEIGHT: 67 IN | WEIGHT: 187.6 LBS | HEART RATE: 110 BPM

## 2019-01-01 VITALS
SYSTOLIC BLOOD PRESSURE: 124 MMHG | RESPIRATION RATE: 18 BRPM | OXYGEN SATURATION: 98 % | DIASTOLIC BLOOD PRESSURE: 82 MMHG | BODY MASS INDEX: 32.03 KG/M2 | HEART RATE: 107 BPM | TEMPERATURE: 99.2 F | WEIGHT: 198.3 LBS

## 2019-01-01 VITALS
RESPIRATION RATE: 20 BRPM | TEMPERATURE: 97.9 F | HEART RATE: 101 BPM | DIASTOLIC BLOOD PRESSURE: 76 MMHG | BODY MASS INDEX: 29.76 KG/M2 | WEIGHT: 189.6 LBS | SYSTOLIC BLOOD PRESSURE: 113 MMHG | OXYGEN SATURATION: 98 % | HEIGHT: 67 IN

## 2019-01-01 VITALS
DIASTOLIC BLOOD PRESSURE: 75 MMHG | HEART RATE: 96 BPM | WEIGHT: 180.4 LBS | RESPIRATION RATE: 16 BRPM | BODY MASS INDEX: 28.31 KG/M2 | HEIGHT: 67 IN | SYSTOLIC BLOOD PRESSURE: 118 MMHG | TEMPERATURE: 98.7 F | OXYGEN SATURATION: 96 %

## 2019-01-01 VITALS
RESPIRATION RATE: 18 BRPM | BODY MASS INDEX: 28.25 KG/M2 | HEART RATE: 94 BPM | TEMPERATURE: 99.4 F | SYSTOLIC BLOOD PRESSURE: 144 MMHG | HEIGHT: 67 IN | OXYGEN SATURATION: 95 % | WEIGHT: 180 LBS | DIASTOLIC BLOOD PRESSURE: 77 MMHG

## 2019-01-01 VITALS
OXYGEN SATURATION: 96 % | RESPIRATION RATE: 16 BRPM | DIASTOLIC BLOOD PRESSURE: 74 MMHG | SYSTOLIC BLOOD PRESSURE: 122 MMHG | HEART RATE: 111 BPM | WEIGHT: 176 LBS | BODY MASS INDEX: 27.62 KG/M2 | HEIGHT: 67 IN | TEMPERATURE: 98.4 F

## 2019-01-01 VITALS
OXYGEN SATURATION: 99 % | RESPIRATION RATE: 16 BRPM | DIASTOLIC BLOOD PRESSURE: 85 MMHG | SYSTOLIC BLOOD PRESSURE: 127 MMHG | HEART RATE: 109 BPM | TEMPERATURE: 99.1 F | BODY MASS INDEX: 31.98 KG/M2 | WEIGHT: 198 LBS

## 2019-01-01 VITALS
HEIGHT: 67 IN | DIASTOLIC BLOOD PRESSURE: 66 MMHG | BODY MASS INDEX: 29.35 KG/M2 | TEMPERATURE: 98 F | RESPIRATION RATE: 16 BRPM | OXYGEN SATURATION: 96 % | HEART RATE: 105 BPM | SYSTOLIC BLOOD PRESSURE: 112 MMHG | WEIGHT: 187 LBS

## 2019-01-01 VITALS — DIASTOLIC BLOOD PRESSURE: 63 MMHG | HEART RATE: 98 BPM | SYSTOLIC BLOOD PRESSURE: 102 MMHG

## 2019-01-01 VITALS — DIASTOLIC BLOOD PRESSURE: 76 MMHG | WEIGHT: 176 LBS | BODY MASS INDEX: 27.57 KG/M2 | SYSTOLIC BLOOD PRESSURE: 110 MMHG

## 2019-01-01 DIAGNOSIS — C21.0 MELANOMA OF ANUS (H): ICD-10-CM

## 2019-01-01 DIAGNOSIS — R41.3 MEMORY LOSS: ICD-10-CM

## 2019-01-01 DIAGNOSIS — C21.0 MELANOMA OF ANUS (H): Primary | ICD-10-CM

## 2019-01-01 DIAGNOSIS — Z01.818 PREOP GENERAL PHYSICAL EXAM: Primary | ICD-10-CM

## 2019-01-01 DIAGNOSIS — C43.9 METASTATIC MELANOMA (H): Primary | ICD-10-CM

## 2019-01-01 DIAGNOSIS — E03.2 DRUG-INDUCED HYPOTHYROIDISM: ICD-10-CM

## 2019-01-01 DIAGNOSIS — R19.7 DIARRHEA: Primary | ICD-10-CM

## 2019-01-01 DIAGNOSIS — Z87.440 HISTORY OF UTI: ICD-10-CM

## 2019-01-01 DIAGNOSIS — C43.9 METASTATIC MALIGNANT MELANOMA (H): Primary | ICD-10-CM

## 2019-01-01 DIAGNOSIS — C79.31 BRAIN METASTASIS: ICD-10-CM

## 2019-01-01 DIAGNOSIS — R42 LIGHTHEADEDNESS: ICD-10-CM

## 2019-01-01 DIAGNOSIS — C79.31 BRAIN METASTASES: Primary | ICD-10-CM

## 2019-01-01 DIAGNOSIS — R25.3 FASCICULATION: Primary | ICD-10-CM

## 2019-01-01 DIAGNOSIS — N30.00 ACUTE CYSTITIS WITHOUT HEMATURIA: ICD-10-CM

## 2019-01-01 DIAGNOSIS — C43.9 METASTATIC MELANOMA (H): ICD-10-CM

## 2019-01-01 DIAGNOSIS — C43.9 METASTATIC MALIGNANT MELANOMA (H): ICD-10-CM

## 2019-01-01 DIAGNOSIS — N39.0 URINARY TRACT INFECTION: Primary | ICD-10-CM

## 2019-01-01 DIAGNOSIS — Z09 FOLLOW-UP EXAMINATION FOLLOWING SURGERY: ICD-10-CM

## 2019-01-01 LAB
ALBUMIN SERPL-MCNC: 2.6 G/DL (ref 3.4–5)
ALBUMIN SERPL-MCNC: 3.3 G/DL (ref 3.4–5)
ALBUMIN SERPL-MCNC: 3.6 G/DL (ref 3.4–5)
ALBUMIN UR-MCNC: 30 MG/DL
ALBUMIN UR-MCNC: NEGATIVE MG/DL
ALBUMIN UR-MCNC: NEGATIVE MG/DL
ALP SERPL-CCNC: 75 U/L (ref 40–150)
ALP SERPL-CCNC: 81 U/L (ref 40–150)
ALP SERPL-CCNC: 94 U/L (ref 40–150)
ALT SERPL W P-5'-P-CCNC: 17 U/L (ref 0–50)
ALT SERPL W P-5'-P-CCNC: 17 U/L (ref 0–50)
ALT SERPL W P-5'-P-CCNC: 54 U/L (ref 0–50)
ANION GAP SERPL CALCULATED.3IONS-SCNC: 10 MMOL/L (ref 3–14)
ANION GAP SERPL CALCULATED.3IONS-SCNC: 6 MMOL/L (ref 3–14)
ANION GAP SERPL CALCULATED.3IONS-SCNC: 8 MMOL/L (ref 3–14)
APPEARANCE UR: ABNORMAL
AST SERPL W P-5'-P-CCNC: 12 U/L (ref 0–45)
AST SERPL W P-5'-P-CCNC: 13 U/L (ref 0–45)
AST SERPL W P-5'-P-CCNC: 35 U/L (ref 0–45)
BACTERIA #/AREA URNS HPF: ABNORMAL /HPF
BACTERIA #/AREA URNS HPF: ABNORMAL /HPF
BACTERIA SPEC CULT: NORMAL
BACTERIA SPEC CULT: NORMAL
BASOPHILS # BLD AUTO: 0 10E9/L (ref 0–0.2)
BASOPHILS # BLD AUTO: 0.1 10E9/L (ref 0–0.2)
BASOPHILS # BLD AUTO: 0.1 10E9/L (ref 0–0.2)
BASOPHILS NFR BLD AUTO: 0.5 %
BASOPHILS NFR BLD AUTO: 0.8 %
BASOPHILS NFR BLD AUTO: 1.1 %
BILIRUB SERPL-MCNC: 0.3 MG/DL (ref 0.2–1.3)
BILIRUB SERPL-MCNC: 0.3 MG/DL (ref 0.2–1.3)
BILIRUB SERPL-MCNC: 0.5 MG/DL (ref 0.2–1.3)
BILIRUB UR QL STRIP: NEGATIVE
BUN SERPL-MCNC: 12 MG/DL (ref 7–30)
BUN SERPL-MCNC: 16 MG/DL (ref 7–30)
BUN SERPL-MCNC: 17 MG/DL (ref 7–30)
CALCIUM SERPL-MCNC: 7.9 MG/DL (ref 8.5–10.1)
CALCIUM SERPL-MCNC: 8.9 MG/DL (ref 8.5–10.1)
CALCIUM SERPL-MCNC: 9.2 MG/DL (ref 8.5–10.1)
CAOX CRY #/AREA URNS HPF: ABNORMAL /HPF
CHLORIDE SERPL-SCNC: 102 MMOL/L (ref 94–109)
CHLORIDE SERPL-SCNC: 103 MMOL/L (ref 94–109)
CHLORIDE SERPL-SCNC: 106 MMOL/L (ref 94–109)
CO2 SERPL-SCNC: 21 MMOL/L (ref 20–32)
CO2 SERPL-SCNC: 23 MMOL/L (ref 20–32)
CO2 SERPL-SCNC: 24 MMOL/L (ref 20–32)
COLOR UR AUTO: YELLOW
COPATH REPORT: NORMAL
COPATH REPORT: NORMAL
CORTIS SERPL-MCNC: 16 UG/DL (ref 4–22)
CREAT SERPL-MCNC: 0.75 MG/DL (ref 0.52–1.04)
CREAT SERPL-MCNC: 0.78 MG/DL (ref 0.52–1.04)
CREAT SERPL-MCNC: 0.9 MG/DL (ref 0.52–1.04)
DIFFERENTIAL METHOD BLD: ABNORMAL
DIFFERENTIAL METHOD BLD: NORMAL
DIFFERENTIAL METHOD BLD: NORMAL
EOSINOPHIL # BLD AUTO: 0.2 10E9/L (ref 0–0.7)
EOSINOPHIL # BLD AUTO: 0.2 10E9/L (ref 0–0.7)
EOSINOPHIL # BLD AUTO: 0.5 10E9/L (ref 0–0.7)
EOSINOPHIL NFR BLD AUTO: 2.4 %
EOSINOPHIL NFR BLD AUTO: 4.6 %
EOSINOPHIL NFR BLD AUTO: 6.6 %
ERYTHROCYTE [DISTWIDTH] IN BLOOD BY AUTOMATED COUNT: 13.2 % (ref 10–15)
ERYTHROCYTE [DISTWIDTH] IN BLOOD BY AUTOMATED COUNT: 14 % (ref 10–15)
ERYTHROCYTE [DISTWIDTH] IN BLOOD BY AUTOMATED COUNT: 15.1 % (ref 10–15)
GFR SERPL CREATININE-BSD FRML MDRD: 69 ML/MIN/{1.73_M2}
GFR SERPL CREATININE-BSD FRML MDRD: 82 ML/MIN/{1.73_M2}
GFR SERPL CREATININE-BSD FRML MDRD: 86 ML/MIN/{1.73_M2}
GLUCOSE BLDC GLUCOMTR-MCNC: 111 MG/DL (ref 70–99)
GLUCOSE BLDC GLUCOMTR-MCNC: 119 MG/DL (ref 70–99)
GLUCOSE CSF-MCNC: NORMAL MG/DL (ref 40–70)
GLUCOSE SERPL-MCNC: 103 MG/DL (ref 70–99)
GLUCOSE SERPL-MCNC: 106 MG/DL (ref 70–99)
GLUCOSE SERPL-MCNC: 120 MG/DL (ref 70–99)
GLUCOSE SERPL-MCNC: 122 MG/DL (ref 70–99)
GLUCOSE UR STRIP-MCNC: NEGATIVE MG/DL
HCT VFR BLD AUTO: 36.4 % (ref 35–47)
HCT VFR BLD AUTO: 41.7 % (ref 35–47)
HCT VFR BLD AUTO: 42.7 % (ref 35–47)
HGB BLD-MCNC: 12.4 G/DL (ref 11.7–15.7)
HGB BLD-MCNC: 13.3 G/DL (ref 11.7–15.7)
HGB BLD-MCNC: 13.8 G/DL (ref 11.7–15.7)
HGB UR QL STRIP: ABNORMAL
HGB UR QL STRIP: ABNORMAL
HGB UR QL STRIP: NEGATIVE
IMM GRANULOCYTES # BLD: 0 10E9/L (ref 0–0.4)
IMM GRANULOCYTES NFR BLD: 0.4 %
IMM GRANULOCYTES NFR BLD: 0.5 %
IMM GRANULOCYTES NFR BLD: 0.5 %
KETONES UR STRIP-MCNC: NEGATIVE MG/DL
LEUKOCYTE ESTERASE UR QL STRIP: ABNORMAL
LYMPHOCYTES # BLD AUTO: 1.2 10E9/L (ref 0.8–5.3)
LYMPHOCYTES # BLD AUTO: 1.3 10E9/L (ref 0.8–5.3)
LYMPHOCYTES # BLD AUTO: 1.8 10E9/L (ref 0.8–5.3)
LYMPHOCYTES NFR BLD AUTO: 17.8 %
LYMPHOCYTES NFR BLD AUTO: 22.5 %
LYMPHOCYTES NFR BLD AUTO: 27.6 %
Lab: NORMAL
MCH RBC QN AUTO: 28.2 PG (ref 26.5–33)
MCH RBC QN AUTO: 28.8 PG (ref 26.5–33)
MCH RBC QN AUTO: 29.2 PG (ref 26.5–33)
MCHC RBC AUTO-ENTMCNC: 31.9 G/DL (ref 31.5–36.5)
MCHC RBC AUTO-ENTMCNC: 32.3 G/DL (ref 31.5–36.5)
MCHC RBC AUTO-ENTMCNC: 34.1 G/DL (ref 31.5–36.5)
MCV RBC AUTO: 85 FL (ref 78–100)
MCV RBC AUTO: 87 FL (ref 78–100)
MCV RBC AUTO: 91 FL (ref 78–100)
METHYLMALONATE SERPL-SCNC: 0.24 UMOL/L (ref 0–0.4)
MISCELLANEOUS TEST: NORMAL
MONOCYTES # BLD AUTO: 0.3 10E9/L (ref 0–1.3)
MONOCYTES # BLD AUTO: 0.4 10E9/L (ref 0–1.3)
MONOCYTES # BLD AUTO: 0.6 10E9/L (ref 0–1.3)
MONOCYTES NFR BLD AUTO: 5.5 %
MONOCYTES NFR BLD AUTO: 7.2 %
MONOCYTES NFR BLD AUTO: 7.9 %
MUCOUS THREADS #/AREA URNS LPF: PRESENT /LPF
MUCOUS THREADS #/AREA URNS LPF: PRESENT /LPF
NEUTROPHILS # BLD AUTO: 2.5 10E9/L (ref 1.6–8.3)
NEUTROPHILS # BLD AUTO: 4.8 10E9/L (ref 1.6–8.3)
NEUTROPHILS # BLD AUTO: 5.3 10E9/L (ref 1.6–8.3)
NEUTROPHILS NFR BLD AUTO: 59.6 %
NEUTROPHILS NFR BLD AUTO: 61.4 %
NEUTROPHILS NFR BLD AUTO: 73.1 %
NITRATE UR QL: NEGATIVE
NRBC # BLD AUTO: 0 10*3/UL
NRBC BLD AUTO-RTO: 0 /100
PH UR STRIP: 5 PH (ref 5–7)
PH UR STRIP: 5 PH (ref 5–7)
PH UR STRIP: 8 PH (ref 5–7)
PLATELET # BLD AUTO: 219 10E9/L (ref 150–450)
PLATELET # BLD AUTO: 388 10E9/L (ref 150–450)
PLATELET # BLD AUTO: 402 10E9/L (ref 150–450)
POTASSIUM SERPL-SCNC: 3.6 MMOL/L (ref 3.4–5.3)
POTASSIUM SERPL-SCNC: 3.7 MMOL/L (ref 3.4–5.3)
POTASSIUM SERPL-SCNC: 4.1 MMOL/L (ref 3.4–5.3)
PROT CSF-MCNC: NORMAL MG/DL (ref 15–60)
PROT SERPL-MCNC: 6.2 G/DL (ref 6.8–8.8)
PROT SERPL-MCNC: 6.8 G/DL (ref 6.8–8.8)
PROT SERPL-MCNC: 7.8 G/DL (ref 6.8–8.8)
RBC # BLD AUTO: 4.31 10E12/L (ref 3.8–5.2)
RBC # BLD AUTO: 4.56 10E12/L (ref 3.8–5.2)
RBC # BLD AUTO: 4.9 10E12/L (ref 3.8–5.2)
RBC # CSF MANUAL: NORMAL /UL (ref 0–2)
RBC #/AREA URNS AUTO: 11 /HPF (ref 0–2)
RBC #/AREA URNS AUTO: 2 /HPF (ref 0–2)
RBC #/AREA URNS AUTO: 77 /HPF (ref 0–2)
RESULT: ABNORMAL
RESULT: ABNORMAL
RESULT: NORMAL
RESULT: NORMAL
SEND OUTS MISC TEST CODE: ABNORMAL
SEND OUTS MISC TEST CODE: ABNORMAL
SEND OUTS MISC TEST CODE: NORMAL
SEND OUTS MISC TEST CODE: NORMAL
SEND OUTS MISC TEST SPECIMEN: ABNORMAL
SEND OUTS MISC TEST SPECIMEN: ABNORMAL
SEND OUTS MISC TEST SPECIMEN: NORMAL
SEND OUTS MISC TEST SPECIMEN: NORMAL
SODIUM SERPL-SCNC: 132 MMOL/L (ref 133–144)
SODIUM SERPL-SCNC: 133 MMOL/L (ref 133–144)
SODIUM SERPL-SCNC: 137 MMOL/L (ref 133–144)
SOURCE: ABNORMAL
SP GR UR STRIP: 1.01 (ref 1–1.03)
SP GR UR STRIP: 1.02 (ref 1–1.03)
SP GR UR STRIP: 1.02 (ref 1–1.03)
SPECIMEN SOURCE: NORMAL
SPECIMEN SOURCE: NORMAL
SQUAMOUS #/AREA URNS AUTO: 10 /HPF (ref 0–1)
SQUAMOUS #/AREA URNS AUTO: 14 /HPF (ref 0–1)
SQUAMOUS #/AREA URNS AUTO: 73 /HPF (ref 0–1)
TEST NAME: ABNORMAL
TEST NAME: ABNORMAL
TEST NAME: NORMAL
TEST NAME: NORMAL
TRANS CELLS #/AREA URNS HPF: 1 /HPF
TRANS CELLS #/AREA URNS HPF: 18 /HPF
TRANS CELLS #/AREA URNS HPF: 3 /HPF
TSH SERPL DL<=0.005 MIU/L-ACNC: 3.43 MU/L (ref 0.4–4)
UROBILINOGEN UR STRIP-MCNC: 0 MG/DL (ref 0–2)
VIT B12 SERPL-MCNC: 685 PG/ML (ref 193–986)
WBC # BLD AUTO: 4.2 10E9/L (ref 4–11)
WBC # BLD AUTO: 7.2 10E9/L (ref 4–11)
WBC # BLD AUTO: 7.9 10E9/L (ref 4–11)
WBC # CSF MANUAL: NORMAL /UL (ref 0–5)
WBC #/AREA URNS AUTO: 102 /HPF (ref 0–5)
WBC #/AREA URNS AUTO: 9 /HPF (ref 0–5)
WBC #/AREA URNS AUTO: >182 /HPF (ref 0–5)

## 2019-01-01 PROCEDURE — 25800030 ZZH RX IP 258 OP 636: Mod: ZF | Performed by: INTERNAL MEDICINE

## 2019-01-01 PROCEDURE — 27210794 ZZH OR GENERAL SUPPLY STERILE: Performed by: COLON & RECTAL SURGERY

## 2019-01-01 PROCEDURE — 70553 MRI BRAIN STEM W/O & W/DYE: CPT

## 2019-01-01 PROCEDURE — 78816 PET IMAGE W/CT FULL BODY: CPT | Mod: PS

## 2019-01-01 PROCEDURE — 25500064 ZZH RX 255 OP 636: Performed by: COLON & RECTAL SURGERY

## 2019-01-01 PROCEDURE — 99215 OFFICE O/P EST HI 40 MIN: CPT | Mod: ZP | Performed by: INTERNAL MEDICINE

## 2019-01-01 PROCEDURE — 37000009 ZZH ANESTHESIA TECHNICAL FEE, EACH ADDTL 15 MIN: Performed by: COLON & RECTAL SURGERY

## 2019-01-01 PROCEDURE — 86317 IMMUNOASSAY INFECTIOUS AGENT: CPT | Performed by: PSYCHIATRY & NEUROLOGY

## 2019-01-01 PROCEDURE — 25000128 H RX IP 250 OP 636: Performed by: COLON & RECTAL SURGERY

## 2019-01-01 PROCEDURE — 86255 FLUORESCENT ANTIBODY SCREEN: CPT | Performed by: PSYCHIATRY & NEUROLOGY

## 2019-01-01 PROCEDURE — 25000125 ZZHC RX 250: Performed by: NURSE ANESTHETIST, CERTIFIED REGISTERED

## 2019-01-01 PROCEDURE — 99214 OFFICE O/P EST MOD 30 MIN: CPT | Mod: ZP | Performed by: PHYSICIAN ASSISTANT

## 2019-01-01 PROCEDURE — 40000863 ZZH STATISTIC RADIOLOGY XRAY, US, CT, MAR, NM

## 2019-01-01 PROCEDURE — 85025 COMPLETE CBC W/AUTO DIFF WBC: CPT | Performed by: INTERNAL MEDICINE

## 2019-01-01 PROCEDURE — 82533 TOTAL CORTISOL: CPT | Performed by: INTERNAL MEDICINE

## 2019-01-01 PROCEDURE — 71000027 ZZH RECOVERY PHASE 2 EACH 15 MINS: Performed by: COLON & RECTAL SURGERY

## 2019-01-01 PROCEDURE — 83520 IMMUNOASSAY QUANT NOS NONAB: CPT | Performed by: PSYCHIATRY & NEUROLOGY

## 2019-01-01 PROCEDURE — 36000051 ZZH SURGERY LEVEL 2 1ST 30 MIN - UMMC: Performed by: COLON & RECTAL SURGERY

## 2019-01-01 PROCEDURE — A9585 GADOBUTROL INJECTION: HCPCS | Performed by: INTERNAL MEDICINE

## 2019-01-01 PROCEDURE — 36000053 ZZH SURGERY LEVEL 2 EA 15 ADDTL MIN - UMMC: Performed by: COLON & RECTAL SURGERY

## 2019-01-01 PROCEDURE — 25000128 H RX IP 250 OP 636: Mod: ZF | Performed by: INTERNAL MEDICINE

## 2019-01-01 PROCEDURE — 36415 COLL VENOUS BLD VENIPUNCTURE: CPT

## 2019-01-01 PROCEDURE — 96415 CHEMO IV INFUSION ADDL HR: CPT

## 2019-01-01 PROCEDURE — 82962 GLUCOSE BLOOD TEST: CPT

## 2019-01-01 PROCEDURE — 87086 URINE CULTURE/COLONY COUNT: CPT | Performed by: PHYSICIAN ASSISTANT

## 2019-01-01 PROCEDURE — 96413 CHEMO IV INFUSION 1 HR: CPT

## 2019-01-01 PROCEDURE — 84157 ASSAY OF PROTEIN OTHER: CPT | Performed by: PSYCHIATRY & NEUROLOGY

## 2019-01-01 PROCEDURE — 84182 PROTEIN WESTERN BLOT TEST: CPT | Performed by: PSYCHIATRY & NEUROLOGY

## 2019-01-01 PROCEDURE — G0463 HOSPITAL OUTPT CLINIC VISIT: HCPCS | Mod: ZF

## 2019-01-01 PROCEDURE — 25500064 ZZH RX 255 OP 636: Performed by: INTERNAL MEDICINE

## 2019-01-01 PROCEDURE — 84999 UNLISTED CHEMISTRY PROCEDURE: CPT | Performed by: PSYCHIATRY & NEUROLOGY

## 2019-01-01 PROCEDURE — 99214 OFFICE O/P EST MOD 30 MIN: CPT | Mod: ZP | Performed by: INTERNAL MEDICINE

## 2019-01-01 PROCEDURE — 40000169 ZZH STATISTIC PRE-PROCEDURE ASSESSMENT I: Performed by: COLON & RECTAL SURGERY

## 2019-01-01 PROCEDURE — A9585 GADOBUTROL INJECTION: HCPCS | Performed by: PSYCHIATRY & NEUROLOGY

## 2019-01-01 PROCEDURE — 25500064 ZZH RX 255 OP 636: Performed by: PSYCHIATRY & NEUROLOGY

## 2019-01-01 PROCEDURE — 96417 CHEMO IV INFUS EACH ADDL SEQ: CPT

## 2019-01-01 PROCEDURE — 88309 TISSUE EXAM BY PATHOLOGIST: CPT | Performed by: COLON & RECTAL SURGERY

## 2019-01-01 PROCEDURE — 25000132 ZZH RX MED GY IP 250 OP 250 PS 637: Performed by: COLON & RECTAL SURGERY

## 2019-01-01 PROCEDURE — 80053 COMPREHEN METABOLIC PANEL: CPT | Performed by: INTERNAL MEDICINE

## 2019-01-01 PROCEDURE — 25000125 ZZHC RX 250: Performed by: PSYCHIATRY & NEUROLOGY

## 2019-01-01 PROCEDURE — 83519 RIA NONANTIBODY: CPT | Performed by: PSYCHIATRY & NEUROLOGY

## 2019-01-01 PROCEDURE — 72156 MRI NECK SPINE W/O & W/DYE: CPT

## 2019-01-01 PROCEDURE — 25000128 H RX IP 250 OP 636: Performed by: NURSE ANESTHETIST, CERTIFIED REGISTERED

## 2019-01-01 PROCEDURE — 82607 VITAMIN B-12: CPT | Performed by: PSYCHIATRY & NEUROLOGY

## 2019-01-01 PROCEDURE — 87086 URINE CULTURE/COLONY COUNT: CPT | Performed by: NURSE PRACTITIONER

## 2019-01-01 PROCEDURE — G0463 HOSPITAL OUTPT CLINIC VISIT: HCPCS | Performed by: RADIOLOGY

## 2019-01-01 PROCEDURE — 74176 CT ABD & PELVIS W/O CONTRAST: CPT

## 2019-01-01 PROCEDURE — 82945 GLUCOSE OTHER FLUID: CPT | Performed by: PSYCHIATRY & NEUROLOGY

## 2019-01-01 PROCEDURE — 81001 URINALYSIS AUTO W/SCOPE: CPT | Performed by: PHYSICIAN ASSISTANT

## 2019-01-01 PROCEDURE — 81001 URINALYSIS AUTO W/SCOPE: CPT | Performed by: NURSE PRACTITIONER

## 2019-01-01 PROCEDURE — 37000008 ZZH ANESTHESIA TECHNICAL FEE, 1ST 30 MIN: Performed by: COLON & RECTAL SURGERY

## 2019-01-01 PROCEDURE — 25000128 H RX IP 250 OP 636: Mod: ZF | Performed by: PHYSICIAN ASSISTANT

## 2019-01-01 PROCEDURE — A9552 F18 FDG: HCPCS | Performed by: INTERNAL MEDICINE

## 2019-01-01 PROCEDURE — 72197 MRI PELVIS W/O & W/DYE: CPT

## 2019-01-01 PROCEDURE — 83921 ORGANIC ACID SINGLE QUANT: CPT | Performed by: PSYCHIATRY & NEUROLOGY

## 2019-01-01 PROCEDURE — 34300033 ZZH RX 343: Performed by: INTERNAL MEDICINE

## 2019-01-01 PROCEDURE — 84443 ASSAY THYROID STIM HORMONE: CPT | Performed by: INTERNAL MEDICINE

## 2019-01-01 PROCEDURE — 25000132 ZZH RX MED GY IP 250 OP 250 PS 637: Performed by: ANESTHESIOLOGY

## 2019-01-01 PROCEDURE — 77003 FLUOROGUIDE FOR SPINE INJECT: CPT

## 2019-01-01 PROCEDURE — A9585 GADOBUTROL INJECTION: HCPCS | Performed by: COLON & RECTAL SURGERY

## 2019-01-01 RX ORDER — LEVOTHYROXINE SODIUM 125 UG/1
125 TABLET ORAL DAILY
Qty: 30 TABLET | Refills: 1 | Status: SHIPPED | OUTPATIENT
Start: 2019-01-01 | End: 2019-01-01

## 2019-01-01 RX ORDER — SODIUM CHLORIDE 9 MG/ML
1000 INJECTION, SOLUTION INTRAVENOUS CONTINUOUS PRN
Status: CANCELLED
Start: 2019-01-01

## 2019-01-01 RX ORDER — OXYCODONE HYDROCHLORIDE 5 MG/1
5-10 TABLET ORAL EVERY 4 HOURS PRN
Qty: 30 TABLET | Refills: 0 | Status: SHIPPED | OUTPATIENT
Start: 2019-01-01 | End: 2019-01-01

## 2019-01-01 RX ORDER — LORAZEPAM 0.5 MG/1
0.5 TABLET ORAL EVERY 4 HOURS PRN
Qty: 30 TABLET | Refills: 3
Start: 2019-01-01 | End: 2019-01-01

## 2019-01-01 RX ORDER — EPINEPHRINE 0.3 MG/.3ML
0.3 INJECTION SUBCUTANEOUS EVERY 5 MIN PRN
Status: CANCELLED | OUTPATIENT
Start: 2019-01-01

## 2019-01-01 RX ORDER — FENTANYL CITRATE 50 UG/ML
25-50 INJECTION, SOLUTION INTRAMUSCULAR; INTRAVENOUS EVERY 5 MIN PRN
Status: DISCONTINUED | OUTPATIENT
Start: 2019-01-01 | End: 2019-01-01 | Stop reason: HOSPADM

## 2019-01-01 RX ORDER — LOPERAMIDE HCL 2 MG
2 CAPSULE ORAL 4 TIMES DAILY PRN
COMMUNITY

## 2019-01-01 RX ORDER — ONDANSETRON 2 MG/ML
INJECTION INTRAMUSCULAR; INTRAVENOUS PRN
Status: DISCONTINUED | OUTPATIENT
Start: 2019-01-01 | End: 2019-01-01

## 2019-01-01 RX ORDER — NAPROXEN 250 MG/1
250 TABLET ORAL 2 TIMES DAILY WITH MEALS
Qty: 60 TABLET | Refills: 0 | COMMUNITY
Start: 2019-01-01 | End: 2019-01-01

## 2019-01-01 RX ORDER — PROPOFOL 10 MG/ML
INJECTION, EMULSION INTRAVENOUS CONTINUOUS PRN
Status: DISCONTINUED | OUTPATIENT
Start: 2019-01-01 | End: 2019-01-01

## 2019-01-01 RX ORDER — NICOTINE POLACRILEX 4 MG
15-30 LOZENGE BUCCAL
Status: DISCONTINUED | OUTPATIENT
Start: 2019-01-01 | End: 2019-01-01 | Stop reason: HOSPADM

## 2019-01-01 RX ORDER — ONDANSETRON 2 MG/ML
4 INJECTION INTRAMUSCULAR; INTRAVENOUS EVERY 30 MIN PRN
Status: DISCONTINUED | OUTPATIENT
Start: 2019-01-01 | End: 2019-01-01 | Stop reason: HOSPADM

## 2019-01-01 RX ORDER — EPINEPHRINE 1 MG/ML
0.3 INJECTION, SOLUTION, CONCENTRATE INTRAVENOUS EVERY 5 MIN PRN
Status: CANCELLED | OUTPATIENT
Start: 2019-01-01

## 2019-01-01 RX ORDER — SODIUM CHLORIDE, SODIUM LACTATE, POTASSIUM CHLORIDE, CALCIUM CHLORIDE 600; 310; 30; 20 MG/100ML; MG/100ML; MG/100ML; MG/100ML
INJECTION, SOLUTION INTRAVENOUS CONTINUOUS
Status: DISCONTINUED | OUTPATIENT
Start: 2019-01-01 | End: 2019-01-01 | Stop reason: HOSPADM

## 2019-01-01 RX ORDER — NALOXONE HYDROCHLORIDE 0.4 MG/ML
.1-.4 INJECTION, SOLUTION INTRAMUSCULAR; INTRAVENOUS; SUBCUTANEOUS
Status: DISCONTINUED | OUTPATIENT
Start: 2019-01-01 | End: 2019-01-01 | Stop reason: HOSPADM

## 2019-01-01 RX ORDER — LEVOTHYROXINE SODIUM 125 UG/1
125 TABLET ORAL DAILY
Qty: 30 TABLET | Refills: 1 | Status: SHIPPED | OUTPATIENT
Start: 2019-01-01

## 2019-01-01 RX ORDER — METHYLPREDNISOLONE SODIUM SUCCINATE 125 MG/2ML
125 INJECTION, POWDER, LYOPHILIZED, FOR SOLUTION INTRAMUSCULAR; INTRAVENOUS
Status: CANCELLED
Start: 2019-01-01

## 2019-01-01 RX ORDER — ALBUTEROL SULFATE 0.83 MG/ML
2.5 SOLUTION RESPIRATORY (INHALATION)
Status: CANCELLED | OUTPATIENT
Start: 2019-01-01

## 2019-01-01 RX ORDER — CIPROFLOXACIN 500 MG/1
500 TABLET, FILM COATED ORAL 2 TIMES DAILY
Qty: 10 TABLET | Refills: 0 | Status: SHIPPED | OUTPATIENT
Start: 2019-01-01 | End: 2019-01-01

## 2019-01-01 RX ORDER — ACETAMINOPHEN 325 MG/1
650 TABLET ORAL EVERY 4 HOURS PRN
Qty: 50 TABLET | Refills: 0 | COMMUNITY
Start: 2019-01-01 | End: 2019-01-01

## 2019-01-01 RX ORDER — SULFAMETHOXAZOLE/TRIMETHOPRIM 800-160 MG
1 TABLET ORAL 2 TIMES DAILY
Qty: 28 TABLET | Refills: 0 | Status: SHIPPED | OUTPATIENT
Start: 2019-01-01 | End: 2019-01-01

## 2019-01-01 RX ORDER — ALBUTEROL SULFATE 90 UG/1
1-2 AEROSOL, METERED RESPIRATORY (INHALATION)
Status: CANCELLED
Start: 2019-01-01

## 2019-01-01 RX ORDER — LIDOCAINE 40 MG/G
CREAM TOPICAL
Status: DISCONTINUED | OUTPATIENT
Start: 2019-01-01 | End: 2019-01-01 | Stop reason: HOSPADM

## 2019-01-01 RX ORDER — FUROSEMIDE 10 MG/ML
40 INJECTION INTRAMUSCULAR; INTRAVENOUS ONCE
Status: COMPLETED | OUTPATIENT
Start: 2019-01-01 | End: 2019-01-01

## 2019-01-01 RX ORDER — OXYCODONE HYDROCHLORIDE 5 MG/1
TABLET ORAL
COMMUNITY
Start: 2019-01-01

## 2019-01-01 RX ORDER — OXYCODONE HYDROCHLORIDE 5 MG/1
5 TABLET ORAL
Status: COMPLETED | OUTPATIENT
Start: 2019-01-01 | End: 2019-01-01

## 2019-01-01 RX ORDER — LIDOCAINE HYDROCHLORIDE 10 MG/ML
30 INJECTION, SOLUTION EPIDURAL; INFILTRATION; INTRACAUDAL; PERINEURAL ONCE
Status: COMPLETED | OUTPATIENT
Start: 2019-01-01 | End: 2019-01-01

## 2019-01-01 RX ORDER — ACETAMINOPHEN 325 MG/1
975 TABLET ORAL ONCE
Status: COMPLETED | OUTPATIENT
Start: 2019-01-01 | End: 2019-01-01

## 2019-01-01 RX ORDER — DIPHENHYDRAMINE HYDROCHLORIDE 50 MG/ML
50 INJECTION INTRAMUSCULAR; INTRAVENOUS
Status: CANCELLED
Start: 2019-01-01

## 2019-01-01 RX ORDER — DEXAMETHASONE SODIUM PHOSPHATE 4 MG/ML
INJECTION, SOLUTION INTRA-ARTICULAR; INTRALESIONAL; INTRAMUSCULAR; INTRAVENOUS; SOFT TISSUE PRN
Status: DISCONTINUED | OUTPATIENT
Start: 2019-01-01 | End: 2019-01-01

## 2019-01-01 RX ORDER — LORAZEPAM 2 MG/ML
0.5 INJECTION INTRAMUSCULAR EVERY 4 HOURS PRN
Status: CANCELLED
Start: 2019-01-01

## 2019-01-01 RX ORDER — CIPROFLOXACIN 500 MG/1
TABLET, FILM COATED ORAL
COMMUNITY
Start: 2019-01-01

## 2019-01-01 RX ORDER — GADOBUTROL 604.72 MG/ML
10 INJECTION INTRAVENOUS ONCE
Status: COMPLETED | OUTPATIENT
Start: 2019-01-01 | End: 2019-01-01

## 2019-01-01 RX ORDER — LEVETIRACETAM 500 MG/1
500 TABLET ORAL 2 TIMES DAILY
Qty: 60 TABLET | Refills: 11 | Status: SHIPPED | OUTPATIENT
Start: 2019-01-01

## 2019-01-01 RX ORDER — DEXTROSE MONOHYDRATE 25 G/50ML
25-50 INJECTION, SOLUTION INTRAVENOUS
Status: CANCELLED | OUTPATIENT
Start: 2019-01-01

## 2019-01-01 RX ORDER — POLYETHYLENE GLYCOL 3350 17 G/17G
1 POWDER, FOR SOLUTION ORAL DAILY
COMMUNITY

## 2019-01-01 RX ORDER — PROCHLORPERAZINE MALEATE 10 MG
10 TABLET ORAL EVERY 6 HOURS PRN
Qty: 30 TABLET | Refills: 3 | Status: SHIPPED | OUTPATIENT
Start: 2019-01-01

## 2019-01-01 RX ORDER — LORAZEPAM 0.5 MG/1
0.5 TABLET ORAL EVERY 4 HOURS PRN
Qty: 30 TABLET | Refills: 3 | Status: SHIPPED | OUTPATIENT
Start: 2019-01-01 | End: 2019-01-01

## 2019-01-01 RX ORDER — HYDROMORPHONE HYDROCHLORIDE 1 MG/ML
.3-.5 INJECTION, SOLUTION INTRAMUSCULAR; INTRAVENOUS; SUBCUTANEOUS EVERY 10 MIN PRN
Status: DISCONTINUED | OUTPATIENT
Start: 2019-01-01 | End: 2019-01-01 | Stop reason: HOSPADM

## 2019-01-01 RX ORDER — LORAZEPAM 0.5 MG/1
0.5 TABLET ORAL EVERY 4 HOURS PRN
Qty: 30 TABLET | Refills: 3 | Status: SHIPPED | OUTPATIENT
Start: 2019-01-01

## 2019-01-01 RX ORDER — BENZONATATE 100 MG/1
CAPSULE ORAL
Refills: 3 | COMMUNITY
Start: 2018-01-01

## 2019-01-01 RX ORDER — DEXTROSE MONOHYDRATE 25 G/50ML
25-50 INJECTION, SOLUTION INTRAVENOUS
Status: DISCONTINUED | OUTPATIENT
Start: 2019-01-01 | End: 2019-01-01 | Stop reason: HOSPADM

## 2019-01-01 RX ORDER — EPINEPHRINE 1 MG/ML
0.3 INJECTION, SOLUTION INTRAMUSCULAR; SUBCUTANEOUS EVERY 5 MIN PRN
Status: CANCELLED | OUTPATIENT
Start: 2019-01-01

## 2019-01-01 RX ORDER — SODIUM CHLORIDE 9 MG/ML
INJECTION, SOLUTION INTRAVENOUS CONTINUOUS PRN
Status: DISCONTINUED | OUTPATIENT
Start: 2019-01-01 | End: 2019-01-01

## 2019-01-01 RX ORDER — GLYCOPYRROLATE 0.2 MG/ML
INJECTION, SOLUTION INTRAMUSCULAR; INTRAVENOUS PRN
Status: DISCONTINUED | OUTPATIENT
Start: 2019-01-01 | End: 2019-01-01

## 2019-01-01 RX ORDER — OXYCODONE HYDROCHLORIDE 5 MG/1
5 TABLET ORAL
Status: DISCONTINUED | OUTPATIENT
Start: 2019-01-01 | End: 2019-01-01 | Stop reason: HOSPADM

## 2019-01-01 RX ORDER — ONDANSETRON 4 MG/1
4 TABLET, ORALLY DISINTEGRATING ORAL EVERY 30 MIN PRN
Status: DISCONTINUED | OUTPATIENT
Start: 2019-01-01 | End: 2019-01-01 | Stop reason: HOSPADM

## 2019-01-01 RX ORDER — IBUPROFEN 600 MG/1
600 TABLET, FILM COATED ORAL
Status: COMPLETED | OUTPATIENT
Start: 2019-01-01 | End: 2019-01-01

## 2019-01-01 RX ORDER — FENTANYL CITRATE 50 UG/ML
INJECTION, SOLUTION INTRAMUSCULAR; INTRAVENOUS PRN
Status: DISCONTINUED | OUTPATIENT
Start: 2019-01-01 | End: 2019-01-01

## 2019-01-01 RX ORDER — NICOTINE POLACRILEX 4 MG
15-30 LOZENGE BUCCAL
Status: CANCELLED | OUTPATIENT
Start: 2019-01-01

## 2019-01-01 RX ORDER — MEPERIDINE HYDROCHLORIDE 25 MG/ML
25 INJECTION INTRAMUSCULAR; INTRAVENOUS; SUBCUTANEOUS EVERY 30 MIN PRN
Status: CANCELLED | OUTPATIENT
Start: 2019-01-01

## 2019-01-01 RX ADMIN — SODIUM CHLORIDE 250 MG: 900 INJECTION, SOLUTION INTRAVENOUS at 15:41

## 2019-01-01 RX ADMIN — SODIUM CHLORIDE 1000 ML: 9 INJECTION, SOLUTION INTRAVENOUS at 14:11

## 2019-01-01 RX ADMIN — IBUPROFEN 600 MG: 600 TABLET ORAL at 17:34

## 2019-01-01 RX ADMIN — FUROSEMIDE 40 MG: 10 INJECTION INTRAMUSCULAR; INTRAVENOUS at 09:50

## 2019-01-01 RX ADMIN — OXYCODONE HYDROCHLORIDE 5 MG: 5 TABLET ORAL at 17:15

## 2019-01-01 RX ADMIN — SODIUM CHLORIDE: 9 INJECTION, SOLUTION INTRAVENOUS at 14:37

## 2019-01-01 RX ADMIN — SODIUM CHLORIDE 480 MG: 9 INJECTION, SOLUTION INTRAVENOUS at 11:58

## 2019-01-01 RX ADMIN — ACETAMINOPHEN 975 MG: 325 TABLET, FILM COATED ORAL at 12:37

## 2019-01-01 RX ADMIN — GLUCAGON HYDROCHLORIDE 1 MG: 1 INJECTION, POWDER, FOR SOLUTION INTRAMUSCULAR; INTRAVENOUS; SUBCUTANEOUS at 11:04

## 2019-01-01 RX ADMIN — PROPOFOL 150 MCG/KG/MIN: 10 INJECTION, EMULSION INTRAVENOUS at 14:43

## 2019-01-01 RX ADMIN — GADOBUTROL 8 ML: 604.72 INJECTION INTRAVENOUS at 08:26

## 2019-01-01 RX ADMIN — MIDAZOLAM 2 MG: 1 INJECTION INTRAMUSCULAR; INTRAVENOUS at 14:37

## 2019-01-01 RX ADMIN — GADOBUTROL 9 ML: 604.72 INJECTION INTRAVENOUS at 08:25

## 2019-01-01 RX ADMIN — SODIUM PHOSPHATE 2 ENEMA: 7; 19 ENEMA RECTAL at 12:15

## 2019-01-01 RX ADMIN — FENTANYL CITRATE 25 MCG: 50 INJECTION, SOLUTION INTRAMUSCULAR; INTRAVENOUS at 15:59

## 2019-01-01 RX ADMIN — FENTANYL CITRATE 25 MCG: 50 INJECTION, SOLUTION INTRAMUSCULAR; INTRAVENOUS at 15:42

## 2019-01-01 RX ADMIN — ONDANSETRON 4 MG: 2 INJECTION INTRAMUSCULAR; INTRAVENOUS at 14:53

## 2019-01-01 RX ADMIN — DEXAMETHASONE SODIUM PHOSPHATE 8 MG: 4 INJECTION, SOLUTION INTRA-ARTICULAR; INTRALESIONAL; INTRAMUSCULAR; INTRAVENOUS; SOFT TISSUE at 14:53

## 2019-01-01 RX ADMIN — LIDOCAINE HYDROCHLORIDE 5 ML: 10 INJECTION, SOLUTION EPIDURAL; INFILTRATION; INTRACAUDAL; PERINEURAL at 14:16

## 2019-01-01 RX ADMIN — FLUDEOXYGLUCOSE F-18 11.44 MCI.: 500 INJECTION, SOLUTION INTRAVENOUS at 10:10

## 2019-01-01 RX ADMIN — FENTANYL CITRATE 50 MCG: 50 INJECTION, SOLUTION INTRAMUSCULAR; INTRAVENOUS at 14:49

## 2019-01-01 RX ADMIN — GLYCOPYRROLATE 0.2 MG: 0.2 INJECTION, SOLUTION INTRAMUSCULAR; INTRAVENOUS at 14:52

## 2019-01-01 RX ADMIN — SODIUM CHLORIDE 90 MG: 900 INJECTION, SOLUTION INTRAVENOUS at 14:55

## 2019-01-01 RX ADMIN — GADOBUTROL 10 ML: 604.72 INJECTION INTRAVENOUS at 10:52

## 2019-01-01 ASSESSMENT — ENCOUNTER SYMPTOMS
CONSTIPATION: 1
PANIC: 0
WHEEZING: 0
DISTURBANCES IN COORDINATION: 1
CHILLS: 0
FEVER: 0
HEMATURIA: 0
PARALYSIS: 0
BACK PAIN: 0
BLURRED VISION: 1
JOINT SWELLING: 0
VOMITING: 0
SENSORY CHANGE: 0
HEMOPTYSIS: 0
LOSS OF CONSCIOUSNESS: 0
WEIGHT LOSS: 0
DIFFICULTY URINATING: 0
HEADACHES: 1
SEIZURES: 0
NECK PAIN: 1
SEIZURES: 0
BLOATING: 0
RECTAL PAIN: 0
POOR WOUND HEALING: 0
MUSCLE CRAMPS: 0
BRUISES/BLEEDS EASILY: 0
ARTHRALGIAS: 0
CLAUDICATION: 0
POLYPHAGIA: 0
FOCAL WEAKNESS: 0
STIFFNESS: 0
ORTHOPNEA: 0
NIGHT SWEATS: 0
NUMBNESS: 0
SPUTUM PRODUCTION: 0
POLYDIPSIA: 0
DIZZINESS: 1
DECREASED CONCENTRATION: 1
PALPITATIONS: 0
SLEEP DISTURBANCES DUE TO BREATHING: 0
DIARRHEA: 0
INCREASED ENERGY: 1
NAUSEA: 1
DOUBLE VISION: 1
ABDOMINAL PAIN: 0
ORTHOPNEA: 0
JAUNDICE: 0
HYPOTENSION: 1
DIZZINESS: 1
HEADACHES: 0
FLANK PAIN: 0
HALLUCINATIONS: 0
EYE REDNESS: 0
TREMORS: 1
BOWEL INCONTINENCE: 0
SPEECH CHANGE: 1
HEMATURIA: 0
SKIN CHANGES: 0
ABDOMINAL PAIN: 0
NAUSEA: 1
MEMORY LOSS: 0
POLYDIPSIA: 0
LIGHT-HEADEDNESS: 1
COUGH: 1
EYE DISCHARGE: 0
PND: 0
HALLUCINATIONS: 1
SYNCOPE: 0
DEPRESSION: 0
TINGLING: 0
BACK PAIN: 1
BLOOD IN STOOL: 0
NERVOUS/ANXIOUS: 1
PALPITATIONS: 0
PHOTOPHOBIA: 0
LOSS OF CONSCIOUSNESS: 0
SINUS PAIN: 0
STRIDOR: 0
EYE PAIN: 0
FREQUENCY: 1
MYALGIAS: 0
DEPRESSION: 0
WEIGHT GAIN: 0
INSOMNIA: 0
MEMORY LOSS: 1
DIAPHORESIS: 0
NERVOUS/ANXIOUS: 0
WEAKNESS: 0
HYPERTENSION: 0
DIARRHEA: 1
FEVER: 0
LEG PAIN: 0
DYSURIA: 1
FLANK PAIN: 0
VOMITING: 1
INSOMNIA: 1
EXERCISE INTOLERANCE: 0
SEIZURES: 0
SPEECH CHANGE: 1
CHILLS: 0
MUSCLE WEAKNESS: 0
ALTERED TEMPERATURE REGULATION: 0
WEAKNESS: 1
HEARTBURN: 0
NAIL CHANGES: 0
WEIGHT LOSS: 0
NECK PAIN: 0
TINGLING: 0
FATIGUE: 1
TREMORS: 1
HEARTBURN: 0
SHORTNESS OF BREATH: 0
MYALGIAS: 0
CONSTIPATION: 1
SORE THROAT: 1
DYSURIA: 0
BLOOD IN STOOL: 0
DECREASED APPETITE: 1

## 2019-01-01 ASSESSMENT — PAIN SCALES - GENERAL
PAINLEVEL: NO PAIN (0)
PAINLEVEL: MODERATE PAIN (5)
PAINLEVEL: NO PAIN (0)

## 2019-01-01 ASSESSMENT — MIFFLIN-ST. JEOR
SCORE: 1448.7
SCORE: 1483.63
SCORE: 1416.04
SCORE: 1457.63
SCORE: 1446.01
SCORE: 1395.96
SCORE: 1396.24
SCORE: 1414.1

## 2019-01-01 ASSESSMENT — LIFESTYLE VARIABLES: SUBSTANCE_ABUSE: 0

## 2019-01-01 ASSESSMENT — PAIN DESCRIPTION - DESCRIPTORS: DESCRIPTORS: SHARP

## 2019-01-07 NOTE — PROGRESS NOTES
Colon and Rectal Surgery Clinic Note      RE: Khloe Tamez  : 1955  TATO: 2019    Roseline presents today with her , Memo, for follow up of anal melanoma.    HPI: I saw her on 4/3/18 with a deep perianal melanoma.    Shave biopsy 3/27/18 showed a 3.5 mm deep melanoma with positive deep margin    There was involvement of a left inguinal lymph node on PET at [place] 3/29/2018.    18 I performed a wide local excision. Pathology showed a nodular melanoma with lymphovascular invasion present (Stage T4b)    Brain MRI 18 was normal    2018 She underwent a left femoral sentinel lymph node biopsy with Dr. Adams Shah with 2 of three involved lymph nodes    She saw Dr. Pelayo in oncology and started adjuvant Nivolumab    18 PET CT with possible pulmonary and right gastrocnemius muscle metastases    Colonoscopy 2018 with diverticulosis and one sessile serrated adenoma and two tubular adenoma    CT CAP 11/15/18 showed no convincing evidence for metastatic disease in the abdomen and pelvis and stable pulmonary nodules.    Interval History: Roseline had been doing well until she developed bloody diarrhea about 10 days ago. This started as a clot with a bowel movement in the morning and then in the afternoon she had bloody loose stool that soaked through her clothing. She was seen in the ER in her hometown with hgb stable at 13. She had a CT 18 showing a small region of circumferential wall thickening of the rectosigmoid junction. She has not had any bleeding since that time. She does have new constipation and is taking daily Miralax. With the miralax she is only having small, skinny stools daily. She has increased fatigue but thinks this is due to anxiety. She has lost 9 pounds in the last month. She denies any nausea, vomiting, or abdominal pain.    Physical examination:  Examination was chaperoned by India Smith NP     Vitals: /82 (BP Location: Left arm, Patient  "Position: Sitting, Cuff Size: Adult Regular)   Pulse 115   Temp 99.2  F (37.3  C) (Oral)   Ht 5' 5.98\"   Wt 198 lb 14.4 oz   SpO2 98%   BMI 32.12 kg/m    BMI= Body mass index is 32.12 kg/m .    No palpable inguinal lymphadenopathy. Digital rectal exam with firm but somewhat mobile lesion in the left posterior position just inside the anal verge. Anoscopy with a 2.5 cm lesion in the left posterior position with central depression. Using a baby Tischler forceps, several biopsies obtained of this and hemostasis was obtained using Monsel solution. She tolerated this well.    Laboratory data:    Recent Labs   Lab Test 18  1358   WBC 8.5   HGB 13.6      CR 0.92   ALBUMIN 3.5   BILITOTAL 0.4   ALKPHOS 94   ALT 21   AST 14       Assessment/plan:  Anal lesion that I think is likely a recurrent melanoma. Biopsies obtained today. I discussed with Roseline and Memo possible local excision versus proctectomy. Dicussed risks of local excision including recurrence and fecal incontinence. Would like to obtain a 3T MRI to better characterize. Will also have her follow up with Dr. Murphy Valentine with oncology. Patient's questions were answered to her stated satisfaction and she is in agreement with this plan.    Total face to face time was 25 minutes, >50% counseling.    For details of past medical history, surgical history, family history, medications, allergies, and review of systems, please see details below.    Medical history:  Past Medical History:   Diagnosis Date     Melanoma malignant, perianal skin (H)      Obesity (BMI 30.0-34.9)      Osteoarthritis        Surgical history:  Past Surgical History:   Procedure Laterality Date     BIOPSY NODE SENTINEL Left 2018    Procedure: BIOPSY NODE SENTINEL;  [place] Lymph Node Biopsy x3, Lymphatic mapping;  Surgeon: Adams Sahh MD;  [facility]      SECTION       COLONOSCOPY       EXAM UNDER ANESTHESIA ANUS N/A 2018    Procedure: EXAM UNDER " ANESTHESIA ANUS;  Examination Under Anesthesia Anus, Local Excision of Perianal Melanoma;  Surgeon: Ronald Wright MD;  [facility]     EXCISE LESION RECTUM N/A 4/13/2018    Procedure: EXCISE LESION RECTUM;;  Surgeon: Ronald Wright MD;  [facility]       Family history:  No family history on file.    Medications:  Current Outpatient Medications   Medication Sig Dispense Refill     acetaminophen (TYLENOL ARTHRITIS PAIN) 650 MG CR tablet Take 650 mg by mouth every 6 hours as needed for mild pain or fever       benzonatate (TESSALON) 100 MG capsule Take 1 capsule (100 mg) by mouth 2 times daily as needed for cough 60 capsule 3     calcium carbonate (OS-JANAY 500 MG [place]. [place]) 1250 MG tablet Take 1 tablet by mouth every evening        DIPHENHYDRAMINE HCL PO Take 25 mg by mouth nightly as needed       FLUZONE QUADRIVALENT injection        guaiFENesin (MUCINEX) 600 MG 12 hr tablet Take 1,200 mg by mouth 2 times daily       levothyroxine (SYNTHROID/LEVOTHROID) 125 MCG tablet Take 1 tablet (125 mcg) by mouth daily 30 tablet 1     LORazepam (ATIVAN) 0.5 MG tablet Take 1 tablet (0.5 mg) by mouth every 4 hours as needed (Anxiety, Nausea/Vomiting or Sleep) 30 tablet 3     MAGNESIUM PO Take by mouth At Bedtime       polyethylene glycol (MIRALAX/GLYCOLAX) packet Take 1 packet by mouth daily       prochlorperazine (COMPAZINE) 10 MG tablet Take 1 tablet (10 mg) by mouth every 6 hours as needed (Nausea/Vomiting) 30 tablet 3     Psyllium (METAMUCIL PO) Take 1 packet by mouth       VITAMIN D, CHOLECALCIFEROL, PO Take by mouth every evening          Allergies:  The patientis allergic to bee venom.    Social history:  Social History     Tobacco Use     Smoking status: Never Smoker     Smokeless tobacco: Never Used   Substance Use Topics     Alcohol use: No     Marital status: .    Review of Systems:  Nursing Notes:   Mat Avery, EMT  1/8/2019  8:19 AM  Signed  Chief Complaint   Patient presents with     Rectal  "Problem     Rectal bleeding.       Vitals:    01/08/19 0814   BP: 131/82   BP Location: Left arm   Patient Position: Sitting   Cuff Size: Adult Regular   Pulse: 115   Temp: 99.2  F (37.3  C)   TempSrc: Oral   SpO2: 98%   Weight: 198 lb 14.4 oz   Height: 5' 5.98\"       Body mass index is 32.12 kg/m .      Mat Avery, EMT                             Ronald Wright MD   Professor and Chief  Division of Colon and Rectal Surgery  [facility]      Referring Provider:  Aide Robertson MD  [facility]  [address] [address]     Primary Care Provider:  Aide Robertson  "

## 2019-01-08 NOTE — NURSING NOTE
"Chief Complaint   Patient presents with     Rectal Problem     Rectal bleeding.       Vitals:    01/08/19 0814   BP: 131/82   BP Location: Left arm   Patient Position: Sitting   Cuff Size: Adult Regular   Pulse: 115   Temp: 99.2  F (37.3  C)   TempSrc: Oral   SpO2: 98%   Weight: 198 lb 14.4 oz   Height: 5' 5.98\"       Body mass index is 32.12 kg/m .      Mat Avery, EMT                      "

## 2019-01-08 NOTE — LETTER
2019       RE: Khloe Tamez  [address] [facility] [address]     Dear Colleague,    Thank you for referring your patient, Khloe Tamez, to the [facility] COLON AND RECTAL SURGERY at [facility]. Please see a copy of my visit note below.    Colon and Rectal Surgery Clinic Note      RE: Khloe Tamez  : 1955  TATO: 2019    Roseline presents today with her , Memo, for follow up of anal melanoma.    HPI: I saw her on 4/3/18 with a deep perianal melanoma.    Shave biopsy 3/27/18 showed a 3.5 mm deep melanoma with positive deep margin    There was involvement of a left inguinal lymph node on PET at [place] 3/29/2018.    18 I performed a wide local excision. Pathology showed a nodular melanoma with lymphovascular invasion present (Stage T4b)    Brain MRI 18 was normal    2018 She underwent a left femoral sentinel lymph node biopsy with Dr. Adams Shah with 2 of three involved lymph nodes    She saw Dr. Pelayo in oncology and started adjuvant Nivolumab    18 PET CT with possible pulmonary and right gastrocnemius muscle metastases    Colonoscopy 2018 with diverticulosis and one sessile serrated adenoma and two tubular adenoma    CT CAP 11/15/18 showed no convincing evidence for metastatic disease in the abdomen and pelvis and stable pulmonary nodules.    Interval History: Roseline had been doing well until she developed bloody diarrhea about 10 days ago. This started as a clot with a bowel movement in the morning and then in the afternoon she had bloody loose stool that soaked through her clothing. She was seen in the ER in her hometown with hgb stable at 13. She had a CT 18 showing a small region of circumferential wall thickening of the rectosigmoid junction. She has not had any bleeding since that time. She does have new constipation and is taking daily Miralax. With the miralax she is only having small, skinny stools daily. She  "has increased fatigue but thinks this is due to anxiety. She has lost 9 pounds in the last month. She denies any nausea, vomiting, or abdominal pain.    Physical examination:  Examination was chaperoned by India Smith NP     Vitals: /82 (BP Location: Left arm, Patient Position: Sitting, Cuff Size: Adult Regular)   Pulse 115   Temp 99.2  F (37.3  C) (Oral)   Ht 5' 5.98\"   Wt 198 lb 14.4 oz   SpO2 98%   BMI 32.12 kg/m     BMI= Body mass index is 32.12 kg/m .    No palpable inguinal lymphadenopathy. Digital rectal exam with firm but somewhat mobile lesion in the left posterior position just inside the anal verge. Anoscopy with a 2.5 cm lesion in the left posterior position with central depression. Using a baby Tischler forceps, several biopsies obtained of this and hemostasis was obtained using Monsel solution. She tolerated this well.    Laboratory data:    Recent Labs   Lab Test 12/13/18  1358   WBC 8.5   HGB 13.6      CR 0.92   ALBUMIN 3.5   BILITOTAL 0.4   ALKPHOS 94   ALT 21   AST 14     Assessment/plan:  Anal lesion that I think is likely a recurrent melanoma. Biopsies obtained today. I discussed with Dontae possible local excision versus proctectomy. Dicussed risks of local excision including recurrence and fecal incontinence. Would like to obtain a 3T MRI to better characterize. Will also have her follow up with Dr. Murphy Valentine with oncology. Patient's questions were answered to her stated satisfaction and she is in agreement with this plan.    Total face to face time was 25 minutes, >50% counseling.    For details of past medical history, surgical history, family history, medications, allergies, and review of systems, please see details below.    Medical history:  Past Medical History:   Diagnosis Date     Melanoma malignant, perianal skin (H)      Obesity (BMI 30.0-34.9)      Osteoarthritis        Surgical history:  Past Surgical History:   Procedure Laterality " Date     BIOPSY NODE SENTINEL Left 2018    Procedure: BIOPSY NODE SENTINEL;  [place] Lymph Node Biopsy x3, Lymphatic mapping;  Surgeon: Adams Shah MD;  [facility]      SECTION       COLONOSCOPY       EXAM UNDER ANESTHESIA ANUS N/A 2018    Procedure: EXAM UNDER ANESTHESIA ANUS;  Examination Under Anesthesia Anus, Local Excision of Perianal Melanoma;  Surgeon: Ronald Wright MD;  [facility]     EXCISE LESION RECTUM N/A 2018    Procedure: EXCISE LESION RECTUM;;  Surgeon: Ronald Wright MD;  [facility]       Family history:  No family history on file.    Medications:  Current Outpatient Medications   Medication Sig Dispense Refill     acetaminophen (TYLENOL ARTHRITIS PAIN) 650 MG CR tablet Take 650 mg by mouth every 6 hours as needed for mild pain or fever       benzonatate (TESSALON) 100 MG capsule Take 1 capsule (100 mg) by mouth 2 times daily as needed for cough 60 capsule 3     calcium carbonate (OS-JANAY 500 MG [place]. [place]) 1250 MG tablet Take 1 tablet by mouth every evening        DIPHENHYDRAMINE HCL PO Take 25 mg by mouth nightly as needed       FLUZONE QUADRIVALENT injection        guaiFENesin (MUCINEX) 600 MG 12 hr tablet Take 1,200 mg by mouth 2 times daily       levothyroxine (SYNTHROID/LEVOTHROID) 125 MCG tablet Take 1 tablet (125 mcg) by mouth daily 30 tablet 1     LORazepam (ATIVAN) 0.5 MG tablet Take 1 tablet (0.5 mg) by mouth every 4 hours as needed (Anxiety, Nausea/Vomiting or Sleep) 30 tablet 3     MAGNESIUM PO Take by mouth At Bedtime       polyethylene glycol (MIRALAX/GLYCOLAX) packet Take 1 packet by mouth daily       prochlorperazine (COMPAZINE) 10 MG tablet Take 1 tablet (10 mg) by mouth every 6 hours as needed (Nausea/Vomiting) 30 tablet 3     Psyllium (METAMUCIL PO) Take 1 packet by mouth       VITAMIN D, CHOLECALCIFEROL, PO Take by mouth every evening          Allergies:  The patientis allergic to bee venom.    Social history:  Social History     Tobacco  "Use     Smoking status: Never Smoker     Smokeless tobacco: Never Used   Substance Use Topics     Alcohol use: No     Marital status: .    Review of Systems:  Nursing Notes:   Mat Avery, EMT  1/8/2019  8:19 AM  Signed  Chief Complaint   Patient presents with     Rectal Problem     Rectal bleeding.       Vitals:    01/08/19 0814   BP: 131/82   BP Location: Left arm   Patient Position: Sitting   Cuff Size: Adult Regular   Pulse: 115   Temp: 99.2  F (37.3  C)   TempSrc: Oral   SpO2: 98%   Weight: 198 lb 14.4 oz   Height: 5' 5.98\"       Body mass index is 32.12 kg/m .    Mat Avery, EMT    Ronald Wright MD   Professor and Chief  Division of Colon and Rectal Surgery  [facility]    Referring Provider:  Aide Robertson MD  [facility]  [address] [address]     Primary Care Provider:  Aide Robertsno  "

## 2019-01-08 NOTE — PROGRESS NOTES
Infusion Nursing Note:  Khloe Tamez presents today for Cycle 9 Day 1 of Opdivo.    Patient seen by provider today: No   present during visit today: Not Applicable.    Note: Patient arrives to infusion feeling well. She reports an on-going cough, but says that it is baseline from when she first started her treatment. She denies, any chills, fevers, shortness of breath, or dizziness. She did not eat much in the last day due to prepping for colon surg appointment, so she reports feeling a little more shaky today. Patient reports ongoing fatigue, but says that they are still working on getting her Synthroid dose exact. She reports recently switching from Metamucil to Miralax, but says it is too early to tell if it is working. Per patient, her last BM was yesterday. Re-enforced to call if symptoms worsen. No other new issues or concerns.     Intravenous Access:  Peripheral IV placed.    Treatment Conditions:  Lab Results   Component Value Date    HGB 13.3 01/08/2019     Lab Results   Component Value Date    WBC 7.2 01/08/2019      Lab Results   Component Value Date    ANEU 5.3 01/08/2019     Lab Results   Component Value Date     01/08/2019      Lab Results   Component Value Date     01/08/2019                   Lab Results   Component Value Date    POTASSIUM 4.1 01/08/2019           No results found for: MAG         Lab Results   Component Value Date    CR 0.90 01/08/2019                   Lab Results   Component Value Date    JANAY 8.9 01/08/2019                Lab Results   Component Value Date    BILITOTAL 0.5 01/08/2019           Lab Results   Component Value Date    ALBUMIN 3.6 01/08/2019                    Lab Results   Component Value Date    ALT 17 01/08/2019           Lab Results   Component Value Date    AST 12 01/08/2019       Results reviewed, labs MET treatment parameters, ok to proceed with treatment.      Post Infusion Assessment:  Patient tolerated infusion without  incident.  Blood return noted pre and post infusion.  Site patent and intact, free from redness, edema or discomfort.  No evidence of extravasations.  Access discontinued per protocol.    Discharge Plan:   Patient declined prescription refills.  Discharge instructions reviewed with: Patient.  Patient and/or family verbalized understanding of discharge instructions and all questions answered.  AVS to patient via [email or web address].  Patient will return 2/7/19 for next appointment.   Patient discharged in stable condition accompanied by: .  Departure Mode: Ambulatory.    Chelsie Monroy RN

## 2019-01-08 NOTE — NURSING NOTE
Chief Complaint   Patient presents with     Blood Draw     Labs drawn via PIV placed by RN in Lab. Line flushed with saline. VS taken.      Sofia Tuttle RN

## 2019-01-08 NOTE — PATIENT INSTRUCTIONS
Contact Numbers  [facility]: [phone]    After Hours:  [phone]  Triage: [phone]    Please call the [facility] Triage line if you experience a temperature greater than or equal to 100.5, shaking chills, have uncontrolled nausea, vomiting and/or diarrhea, dizziness, shortness of breath, chest pain, bleeding, unexplained bruising, or if you have any other new/concerning symptoms, questions or concerns.     If it is after hours, weekends, or holidays, please call the main hospital  at  [phone] and ask to speak to the Oncology doctor on call.     If you are having any concerning symptoms or wish to speak to a provider before your next infusion visit, please call your care coordinator or triage to notify them so we can adequately serve you.     If you need a refill on a narcotic prescription or other medication, please call triage before your infusion appointment.         January 2019 Sunday Monday Tuesday Wednesday Thursday Friday Saturday             1     2     3     4     5       6     7     8    [facility]URN   7:45 AM   (30 min.)   Ronald Wright MD   [facility] Colon and Rectal Surgery    [facility]   9:00 AM   (15 min.)   [facility]   [facility] Lab Draw    [facility] ONC INFUSION 60   9:30 AM   (60 min.)    ONCOLOGY INFUSION   [facility] 9     10     11     12       13     14     15     16     17     18     19       20     21     22     23     24     25     26       27     28     29     30     31 February 2019 Sunday Monday Tuesday Wednesday Thursday Friday Saturday                            1     2       3     4     5     6     7    [facility]  10:30 AM   (15 min.)   [facility]   [facility] Lab Draw    [facility] ONC INFUSION 60  11:00 AM   (60 min.)    ONCOLOGY INFUSION   [facility] 8     9       10     11     12     13     14     15     16       17     18      19     20     21     22     23       24     25     26     27     28                               Lab Results:  Recent Results (from the past 12 hour(s))   Comprehensive metabolic panel    Collection Time: 01/08/19 10:01 AM   Result Value Ref Range    Sodium 137 133 - 144 mmol/L    Potassium 4.1 3.4 - 5.3 mmol/L    Chloride 106 94 - 109 mmol/L    Carbon Dioxide 21 20 - 32 mmol/L    Anion Gap 10 3 - 14 mmol/L    Glucose 103 (H) 70 - 99 mg/dL    Urea Nitrogen 17 7 - 30 mg/dL    Creatinine 0.90 0.52 - 1.04 mg/dL    GFR Estimate 69 >60 mL/min/[1.73_m2]    GFR Estimate If Black 80 >60 mL/min/[1.73_m2]    Calcium 8.9 8.5 - 10.1 mg/dL    Bilirubin Total 0.5 0.2 - 1.3 mg/dL    Albumin 3.6 3.4 - 5.0 g/dL    Protein Total 7.8 6.8 - 8.8 g/dL    Alkaline Phosphatase 94 40 - 150 U/L    ALT 17 0 - 50 U/L    AST 12 0 - 45 U/L   TSH with free T4 reflex    Collection Time: 01/08/19 10:01 AM   Result Value Ref Range    TSH 3.43 0.40 - 4.00 mU/L   CBC with platelets differential    Collection Time: 01/08/19 10:01 AM   Result Value Ref Range    WBC 7.2 4.0 - 11.0 10e9/L    RBC Count 4.56 3.8 - 5.2 10e12/L    Hemoglobin 13.3 11.7 - 15.7 g/dL    Hematocrit 41.7 35.0 - 47.0 %    MCV 91 78 - 100 fl    MCH 29.2 26.5 - 33.0 pg    MCHC 31.9 31.5 - 36.5 g/dL    RDW 13.2 10.0 - 15.0 %    Platelet Count 388 150 - 450 10e9/L    Diff Method Automated Method     % Neutrophils 73.1 %    % Lymphocytes 17.8 %    % Monocytes 5.5 %    % Eosinophils 2.4 %    % Basophils 0.8 %    % Immature Granulocytes 0.4 %    Nucleated RBCs 0 0 /100    Absolute Neutrophil 5.3 1.6 - 8.3 10e9/L    Absolute Lymphocytes 1.3 0.8 - 5.3 10e9/L    Absolute Monocytes 0.4 0.0 - 1.3 10e9/L    Absolute Eosinophils 0.2 0.0 - 0.7 10e9/L    Absolute Basophils 0.1 0.0 - 0.2 10e9/L    Abs Immature Granulocytes 0.0 0 - 0.4 10e9/L    Absolute Nucleated RBC 0.0

## 2019-01-11 NOTE — PROGRESS NOTES
Called patient with path results confirming melanoma.  She is scheduled for pelvic MRI early next week.  Will make surgical plans based upon scan results.

## 2019-01-15 NOTE — PROGRESS NOTES
[facility]  [address]  5th Floor  [facility] [place] [id number] [phone]    PRE-OP EVALUATION:  Today's date: 1/15/2019    Khloe Tamez (: 1955) presents for pre-operative evaluation assessment as requested by Dr. Wright.  She requires evaluation and anesthesia risk assessment prior to undergoing surgery/procedure for treatment of melanoma of anus .    Proposed Surgery/ Procedure:  local incision of recurrent anal melanoma.     Date of Surgery/ Procedure:  Undetermined  Time of Surgery/ Procedure: Undetermined   Hospital/Surgical Facility: [facility] – [place], [place]  Primary Physician: Aide Robertson  Type of Anesthesia Anticipated: General    Patient has a Health Care Directive or Living Will:  YES - will bring paper copy.          1. NO - Do you have a history of heart attack, stroke, stent, bypass or surgery on an artery in the head, neck, heart or legs?  2. NO - Do you ever have any pain or discomfort in your chest?  3. NO - Do you have a history of  Heart Failure?  4. NO - Are you troubled by shortness of breath when: walking on the level, up a slight hill or at night?  5. NO - Do you currently have a cold, bronchitis or other respiratory infection?  6. NO - Do you have a cough, shortness of breath or wheezing?  7. NO - Do you sometimes get pains in the calves of your legs when you walk?  8. NO - Do you or anyone in your family have previous history of blood clots?  9. NO - Do you or does anyone in your family have a serious bleeding problem such as prolonged bleeding following surgeries or cuts?  10. NO - Have you ever had problems with anemia or been told to take iron pills?  11. NO - Have you had any abnormal blood loss such as black, tarry or bloody stools, or abnormal vaginal bleeding?  12. NO - Have you ever had a blood transfusion?  13. NO - Have you or any of your relatives ever had problems with anesthesia?  14. NO - Do you have sleep apnea, excessive snoring or daytime  drowsiness?  15. NO - Do you have any prosthetic heart valves?  16. NO - Do you have prosthetic joints?  17. NO - Is there any chance that you may be pregnant?      HPI:     HPI related to upcoming procedure:  Patient seen in the ED on 2019 due to concerns with rectal bleeding.  Patient states rectal bleeding has stopped.  History of melanoma of the anus and metastatic malignant melanoma - followed by [facility].  Had MRI - which showed an exophytic lesion measuring 4cm.  Pelvis MRI showed -   IMPRESSION:   1. Lower rectal 4 cm enhancing lobulated mass, also involving the anal  verge, consistent with biopsy-proven anal melanoma.  It abuts the  internal anal sphincter.  2. Scattered adjacent perirectal lymph nodes some of them with  suspicious morphology.    Patient had a local excision of perianal melanoma on 2018 without issue per patient.       See problem list for active medical problems.  Problems all longstanding and stable, except as noted/documented.  See ROS for pertinent symptoms related to these conditions.                                                                                                                                                            MEDICAL HISTORY:     Patient Active Problem List    Diagnosis Date Noted     Melanoma of anus (H) 2018     Priority: Medium      Past Medical History:   Diagnosis Date     Melanoma malignant, perianal skin (H)      Obesity (BMI 30.0-34.9)      Osteoarthritis      Past Surgical History:   Procedure Laterality Date     BIOPSY NODE SENTINEL Left 2018    Procedure: BIOPSY NODE SENTINEL;  [place] Lymph Node Biopsy x3, Lymphatic mapping;  Surgeon: Adams Shah MD;  Location: [facility] OR      SECTION       COLONOSCOPY       EXAM UNDER ANESTHESIA ANUS N/A 2018    Procedure: EXAM UNDER ANESTHESIA ANUS;  Examination Under Anesthesia Anus, Local Excision of Perianal Melanoma;  Surgeon: Ronald Wright MD;  Location: [facility] OR     EXCISE  LESION RECTUM N/A 4/13/2018    Procedure: EXCISE LESION RECTUM;;  Surgeon: oRnald Wright MD;  Location: [facility] OR     Current Outpatient Medications   Medication Sig Dispense Refill     acetaminophen (TYLENOL ARTHRITIS PAIN) 650 MG CR tablet Take 650 mg by mouth every 6 hours as needed for mild pain or fever       benzonatate (TESSALON) 100 MG capsule Take 1 capsule (100 mg) by mouth 2 times daily as needed for cough 60 capsule 3     calcium carbonate (OS-JANAY 500 MG [place]) 1250 MG tablet Take 1 tablet by mouth every evening        DIPHENHYDRAMINE HCL PO Take 25 mg by mouth nightly as needed       FLUZONE QUADRIVALENT injection        guaiFENesin (MUCINEX) 600 MG 12 hr tablet Take 1,200 mg by mouth 2 times daily       levothyroxine (SYNTHROID/LEVOTHROID) 125 MCG tablet Take 1 tablet (125 mcg) by mouth daily 30 tablet 1     LORazepam (ATIVAN) 0.5 MG tablet Take 1 tablet (0.5 mg) by mouth every 4 hours as needed (Anxiety, Nausea/Vomiting or Sleep) 30 tablet 3     MAGNESIUM PO Take by mouth At Bedtime       polyethylene glycol (MIRALAX/GLYCOLAX) packet Take 1 packet by mouth daily       prochlorperazine (COMPAZINE) 10 MG tablet Take 1 tablet (10 mg) by mouth every 6 hours as needed (Nausea/Vomiting) 30 tablet 3     Psyllium (METAMUCIL PO) Take 1 packet by mouth       VITAMIN D, CHOLECALCIFEROL, PO Take by mouth every evening        OTC products: None, except as noted above    Allergies   Allergen Reactions     Bee Venom Other (See Comments)     Rapid heart rate      Latex Allergy: NO    Social History     Tobacco Use     Smoking status: Never Smoker     Smokeless tobacco: Never Used   Substance Use Topics     Alcohol use: No     History   Drug Use No       REVIEW OF SYSTEMS:   CONSTITUTIONAL: NEGATIVE for fever, chills, change in weight  ENT/MOUTH: NEGATIVE for ear, mouth and throat problems  RESP: NEGATIVE for significant cough or SOB  CV: NEGATIVE for chest pain, palpitations or peripheral edema  PSYCHIATRIC:  NEGATIVE for changes in mood or affect    EXAM:   /85   Pulse 109   Temp 99.1  F (37.3  C) (Oral)   Resp 16   Wt 89.8 kg (198 lb)   SpO2 99%   BMI 31.98 kg/m      GENERAL APPEARANCE: healthy, alert and no distress     EYES: EOMI, PERRL     HENT: ear canals and TM's normal and nose and mouth without ulcers or lesions     NECK: no adenopathy, no asymmetry, masses, or scars and thyroid normal to palpation     RESP: lungs clear to auscultation - no rales, rhonchi or wheezes     CV: regular rates and rhythm, normal S1 S2, no S3 or S4 and no murmur, click or rub     ABDOMEN:  soft, nontender, no HSM or masses and bowel sounds normal     MS: extremities normal- no gross deformities noted, no evidence of inflammation in joints, FROM in all extremities.     SKIN: no suspicious lesions or rashes     NEURO: Normal strength and tone, sensory exam grossly normal, mentation intact and speech normal     PSYCH: mentation appears normal. and affect normal/bright     LYMPHATICS: No cervical adenopathy    DIAGNOSTICS:   EKG: Not indicated due to non-vascular surgery and low risk of event (age <65 and without cardiac risk factors)  EKG was done in the ED on 12/19/2018 and per those notes the EKG showed NSR.  No acute ST changes.        Recent Labs   Lab Test 01/08/19  1001 12/13/18  1358   HGB 13.3 13.6    288    135   POTASSIUM 4.1 3.9   CR 0.90 0.92    Lab work completed 1/8/19, will not repeat.   IMPRESSION:   Reason for surgery/procedure: melanoma of anus.  Diagnosis/reason for consult:  local incision of recurrent anal melanoma.       The proposed surgical procedure is considered LOW risk.    REVISED CARDIAC RISK INDEX  The patient has the following serious cardiovascular risks for perioperative complications such as (MI, PE, VFib and 3  AV Block):  No serious cardiac risks  INTERPRETATION: 0 risks: Class I (very low risk - 0.4% complication rate)    The patient has the following additional risks for  perioperative complications:  No identified additional risks      ICD-10-CM    1. Preop general physical exam Z01.818    2. Melanoma of anus (H) C21.0        RECOMMENDATIONS:     --Consult hospital rounder / IM to assist post-op medical management    --Patient is to take all scheduled medications on the day of surgery EXCEPT for modifications listed below.    No use of NSAIDs or aspirin 7 to 10 days before procedure.     APPROVAL GIVEN to proceed with proposed procedure, without further diagnostic evaluation       Signed Electronically by:      Meka Pina DNP,  APRN, CNP    Copy of this evaluation report is provided to requesting physician.    [facility] Preop Guidelines    Revised Cardiac Risk Index

## 2019-01-15 NOTE — TELEPHONE ENCOUNTER
Orders place per Dr Wright request for same day surgical procedure. Patient will have a local excision of recurrent anal melanoma. Which is a same day surgical procedure. Dr. Wright spoke to the patient via phone today and updated her on the plan of care.

## 2019-01-15 NOTE — PROGRESS NOTES
I reviewed Roseline's MRI and phoned her with the results.  There is an exophytic lesion measuring 4 cm.  There appears to be minimal if any sphincter involvement, with the visible fat plane overlying the internal sphincter.  There are number of visible nodes that cannot accurately be diagnosed as to being malignant or not.  Overall, I think Roseline is best served with an attempt at local excision.  Several of the questionable nodes are in the internal iliac chain, and these will not be resectable, and while APR may lead to a better chance of local control, I do not think that it would likely impact survival and accordingly do not think it is the best choice.  I discussed these options with Roseline.  I discussed the risks of local excision, including especially local recurrence and impaired continence which in the worse case could require a colostomy.  I answered all of her questions to her stated satisfaction and she expressed her understanding.  I will plan to present her at tumor conference next week.  We will schedule a local excision.  We will try to expedite a follow-up appointment with Dr. Valentine, with whom I discussed the case last week.

## 2019-01-15 NOTE — NURSING NOTE
Chief Complaint   Patient presents with     Pre-Op Exam     Pt is here for a pre op exam.         ALIZE Naik on 1/15/2019 at 1:41 PM

## 2019-01-15 NOTE — PATIENT INSTRUCTIONS
[facility] Medication Refill Request Information:  * Please contact your pharmacy regarding ANY request for medication refills.  ** NP Clinic Prescription Fax = [phone]  * Please allow 3 business days for routine medication refills.  * Please allow 5 business days for controlled substance medication refills.     [facility] Test Result notification information:  *You will be notified with in 7-10 days of your appointment day regarding the results of your test.  If you are on MyChart you will be notified as soon as the provider has reviewed the results and signed off on them.    [facility]: [phone]         Before Your Surgery      Call your surgeon if there is any change in your health. This includes signs of a cold or flu (such as a sore throat, runny nose, cough, rash or fever).    Do not smoke, drink alcohol or take over the counter medicine (unless your surgeon or primary care doctor tells you to) for the 24 hours before and after surgery.    If you take prescribed drugs: Follow your doctor s orders about which medicines to take and which to stop until after surgery.    Eating and drinking prior to surgery: follow the instructions from your surgeon    Take a shower or bath the night before surgery. Use the soap your surgeon gave you to gently clean your skin. If you do not have soap from your surgeon, use your regular soap. Do not shave or scrub the surgery site.  Wear clean pajamas and have clean sheets on your bed.

## 2019-01-16 NOTE — TELEPHONE ENCOUNTER
Patient is scheduled for surgery with Dr. Wright      Spoke or left message with: Roseline    Date of Surgery: 1/25/19    Location: [place]    Informed patient they will need an adult  Yes    Pre-op with surgeon (if applicable): LG    H&P: Scheduled with PCP 1/15/19    Additional imaging/appointments: Post-op 2/7/19    Surgery packet: Will be mailed     Additional comments: LG

## 2019-01-17 NOTE — TELEPHONE ENCOUNTER
"Social Work Note: Telephone Call  Oncology Clinic    Data/Intervention:  Patient Name:  Khloe Tamez  /Age:  1955 (63 year old)    Call From:  Social work contacted patient over the phone.  Reason for Call:  RNCC referral to contact patient regarding healthcare directives.    Assessment:  SW spoke with patient over the phone.  Patient indicated she is having surgery soon and was informed through [employer] that she \"doesn't have a power of  in place.\" SW clarified with patient that she is looking to designate a healthcare agent to make medical decisions for her in the case she is unable to do so.  SW explained that patient has a completed healthcare directive which designates her  as her agent for this role.  SW verified that the document was scanned into her medical record and available to her providers.  Patient expressed relief at this confirmation and no other needs reported at this time.    Plan:  SW available to assist with any other identified needs.     Soo Yeon Han, MSW, [facility]  Pager: [phone]  Phone: [phone]          "

## 2019-01-18 NOTE — PROGRESS NOTES
This writer sent the patient's surgery packet for her procedure with Dr. Ronald Wright on 01/25/2019 in the mail on 01/18/2019 suing [employer] first overnight to arrive on Saturday, 01/19/2019. Tracking number is [id number].

## 2019-01-24 NOTE — DISCHARGE INSTRUCTIONS

## 2019-01-24 NOTE — DISCHARGE INSTRUCTIONS

## 2019-01-25 NOTE — DISCHARGE INSTRUCTIONS
Anorectal Surgery Instructions    What can I expect after anorectal surgery?  Most anorectal procedures are done as outpatient surgery, and you go home the same day as the procedure. A few surgical procedures will require that you stay in the hospital for about one to three days. No matter where the procedure is done or how long or short it takes, these recommendations will help you heal and feel more comfortable.    Medicines:  The anal area is very sensitive; you can expect to have some pain for up to 2-4 weeks after the procedure. Your doctor will give you a prescription for one or more pain medications.    Take naprosyn 500 mg twice a day OR ibuprofen 600 mg four times a day     Take this on a regular basis (not as needed) following your surgery.     The drugs are best taken with food.  Do not take if it causes stomach upset or if you have a history of ulcers or gastritis. You can stop the naprosyn (or ibuprofen) or reduce the dose when you are feeling better.    DO NOT use naprosyn, ibuprofen, or other similar agents (eg. Advil or Aleve) if you have inflammatory bowel disease (Ulcerative Colitis or Crohn's disease) or if your doctor as advised you against using these medications    Take acetominaphen (Tylenol) 650-1000 mg four times a day.     Take this on a regular basis (not as needed) following surgery for pain control.     Take the lower dose if you are >65 years old or have liver disease. The maximum dose of acetominaphen is 4000 mg a day. You can stop the acetaminophen or reduce the dose when you are feeling better.    It is important to realize that many narcotic pain relievers (including vicodin, percocet, tylenol #3) also have acetaminophen, and excessive doses of acetaminophen can be dangerous, so do not take these in addition to acetominaphen.  You may take narcotics that don't contain acetominaphen such as oxycodone.      Take oxycodone AS NEEDED in addition to the acetominaphen and naprosyn.       Because narcotics have side effects (including constipation), you should reduce your use of these medications as tolerated as your pain improves.    *In general, the best strategy is to take (if you are able to tolerate it) the tylenol and naprosen on a regular basis until your pain has largely gone away. You can take the narcotic pain medicine as needed in addition to the tylenol and ibuprofen. As your pain begins to lessen, you should cut back on your narcotic use while continuing to take your regular tylenol and naprosyn doses.      Refilling prescriptions. If you need additional pain medication, please call the triage nurse at [phone] during normal business hours (8 a.m. to 4 p.m., Monday though Friday) or have your pharmacy fax a refill request to [phone]. If you call after hours or on the weekends, the doctor on call may not know you personally and may not renew narcotic pain medication by phone. Call your primary care provider for all other medication refills.    Perineal care:  External gauze dressing can be removed the morning after surgery. If you have an adhesive dressing stuck to the incision, DO NOT remove this.   Tub baths:    If possible, take a tub bath immediately after each bowel movement.     Baths should be take at least 3 times daily for the first week to 10 days following your procedure. You should soak in the tub for 10 to 15 minutes each time with water as warm as you can tolerate.     Even after you go back to work, it is a good idea to sit in the tub in the morning, after returning from work, and again in the evening before bedtime.    Bleeding/Infection:    You can expect to have some bleeding after bowel movements, but it should stop soon after you wipe.     Use a wet cloth or perianal pad (Tucks or Preparation H pads) to gently wipe the area after each bowel movement.    Do not rub the anal area or use a lot of pressure.    Using a spray bottle filled with warm water helps  loosen any remaining stool. Blot gently with a soft dry cloth or tissue paper.    Infection around the anal opening is not very common. The anal area has excellent blood supply, which helps the area to heal. Bloody discharge after bowel movements is normal and may last 2 to 4 weeks after your surgery. However, if you bleed between bowel movements and cannot get it to stop, call the triage nurse immediately [phone].    Bowel function:  Take a fiber supplement such as Metamucil, which is over the counter. It is important to drink six to eight glasses of water or juice everyday when using fiber products.    If you do not have a bowel movement after 1-2 days:    Take Milk of Magnesia-2 tablespoons.       If there are no results, repeat this or add over the counter Miralax.      If you still do not have results, contact the clinic.     If there are no results, repeat this. Stop taking Milk of Magnesia or other laxatives if you begin to have diarrhea.    * Constipation will cause you to strain when you have a bowel movement. The hard stool will be difficult to pass, will increase pain and bleeding, and will slow down healing.  Try to avoid constipation and/or diarrhea as this can make the pain and bleeding worse.    * It is important to have regular bowel movements at least every other day and to keep your stool soft.  A high fiber diet, including at least four servings of fruits or vegetables daily, will help to keep your bowel movements regular and soft.    Activity:  After your procedure, there are no restrictions on your activity     except restrictions surrounding being on narcotics and in pain, such as no heavy machine operating or driving.     You may walk, climb stairs, ride in a car, and sit as tolerated.     It is helpful to avoid sitting in one position for long periods (2 or more hours).    After some surgeries, you may be told not to perform any lifting (more than 10 pounds) for several weeks after  surgery.    When to call:  When do I need to call the doctor or triage nurse?    If you experience any of the problems listed here, call our triage nurse during business hours ([phone]).     The nurse will help you with your problem or have the doctor call you.     After hours and on weekends, please call the main hospital number ([phone]) and ask for the colon and rectal surgery person on call.     Some is available to help you 24 hours a day, seven days a week.    Call for:   ? Fever greater than 101 degrees   ? Chills   ? Foul-smelling drainage   ? Nausea and vomiting   ? Diarrhea - greater than 3 water stools in 24 hours   ? Constipation - no bowel movement after 3 days   ? Severe bleeding that does not stop soon after a bowel movement   ? Problems with the incision, including increased pain, swelling, or redness      [facility]  Same-Day Surgery   Adult Discharge Orders & Instructions     For 24 hours after surgery    1. Get plenty of rest.  A responsible adult must stay with you for at least 24 hours after you leave the hospital.   2. Do not drive or use heavy equipment.  If you have weakness or tingling, don't drive or use heavy equipment until this feeling goes away.  3. Do not drink alcohol.  4. Avoid strenuous or risky activities.  Ask for help when climbing stairs.   5. You may feel lightheaded.  IF so, sit for a few minutes before standing.  Have someone help you get up.   6. If you have nausea (feel sick to your stomach): Drink only clear liquids such as apple juice, ginger ale, broth or 7-Up.  Rest may also help.  Be sure to drink enough fluids.  Move to a regular diet as you feel able.  7. You may have a slight fever. Call the doctor if your fever is over 101 F (taken under the tongue) or lasts longer than 24 hours.  8. You may have a dry mouth, a sore throat, muscle aches or trouble sleeping.  These should go away after 24 hours.  9. Do not make  important or legal decisions.   Call your doctor for any of the followin.  Signs of infection (fever, growing tenderness at the surgery site, a large amount of drainage or bleeding, severe pain, foul-smelling drainage, redness, swelling).    2. It has been over 8 to 10 hours since surgery and you are still not able to urinate (pass water).    3.  Headache for over 24 hours.      To contact a doctor, call Dr Wright's clinic at [phone]  or:        [phone] and ask for the resident on call for Colorectal (answered 24 hours a day)      Emergency Department:    [facility]: [phone]       (TTY for hearing impaired: [phone])

## 2019-01-25 NOTE — BRIEF OP NOTE
[facility], [place]    Brief Operative Note    Pre-operative diagnosis: Melanoma Of Anus  Post-operative diagnosis same  Procedure: Procedure(s):  Local Trans anal excision Of recurrent Anal Melanoma  Proctoscopy  Surgeon: Surgeon(s) and Role:     * Ronald Wright MD - Primary     * Yuli Almanzar MD - Fellow - Assisting  Anesthesia: Monitor Anesthesia Care   Estimated blood loss: Minimal  Drains: None  Specimens:   ID Type Source Tests Collected by Time Destination   A : anal mass Tissue Anus SURGICAL PATHOLOGY EXAM Ronald Wright MD 1/25/2019  3:57 PM      Findings:   exophytic and friable mass on the left/anterior midline, 3cm x 4 cm.  Primary closure.  Rigid proctoscopy performed to confirm patency of lumen..  Complications: None.  Implants: None.

## 2019-01-25 NOTE — ANESTHESIA PREPROCEDURE EVALUATION
Anesthesia Pre-Procedure Evaluation    Patient: Khloe Tamez   MRN:     [id number] Gender:   female   Age:    63 year old :      1955        Preoperative Diagnosis: Melanoma Of Anus   Procedure(s):  Local Excision Of Recurrent Anal Melanoma     Past Medical History:   Diagnosis Date     Melanoma malignant, perianal skin (H)      Obesity (BMI 30.0-34.9)      Osteoarthritis       Past Surgical History:   Procedure Laterality Date     BIOPSY NODE SENTINEL Left 2018    Procedure: BIOPSY NODE SENTINEL;  [place] Lymph Node Biopsy x3, Lymphatic mapping;  Surgeon: Adams Shah MD;  [facility]: [facility]      SECTION       COLONOSCOPY       EXAM UNDER ANESTHESIA ANUS N/A 2018    Procedure: EXAM UNDER ANESTHESIA ANUS;  Examination Under Anesthesia Anus, Local Excision of Perianal Melanoma;  Surgeon: Ronald Wright MD;  [facility]     EXCISE LESION RECTUM N/A 2018    Procedure: EXCISE LESION RECTUM;;  Surgeon: Ronald Wright MD;  [facility]          Anesthesia Evaluation     . Pt has had prior anesthetic. Type: MAC    No history of anesthetic complications          ROS/MED HX    ENT/Pulmonary:  - neg pulmonary ROS     Neurologic:      (-) seizures and CVA   Cardiovascular:  - neg cardiovascular ROS       METS/Exercise Tolerance:     Hematologic:  - neg hematologic  ROS      (-) anemia   Musculoskeletal:   (+) arthritis, , , -       GI/Hepatic:  - neg GI/hepatic ROS   (+) GERD       Renal/Genitourinary:  - ROS Renal section negative       Endo:     (+) thyroid problem hypothyroidism, .   (-) Type II DM   Psychiatric:  - neg psychiatric ROS       Infectious Disease:  - neg infectious disease ROS       Malignancy:   (+) Malignancy (anal melanoma) History of Skin  Skin CA Active status post,         Other:                         PHYSICAL EXAM:   Mental Status/Neuro: A/A/O   Airway: Facies: Feasible  Mallampati: I  Mouth/Opening: Full  TM distance: > 6 cm  Neck ROM: Full   Respiratory:  "Auscultation: CTAB     Resp. Rate: Normal     Resp. Effort: Normal      CV: Rhythm: Regular  Rate: Age appropriate  Heart: Normal Sounds   Comments:      Dental: Normal                  Lab Results   Component Value Date    WBC 7.2 01/08/2019    HGB 13.3 01/08/2019    HCT 41.7 01/08/2019     01/08/2019     01/08/2019    POTASSIUM 4.1 01/08/2019    CHLORIDE 106 01/08/2019    CO2 21 01/08/2019    BUN 17 01/08/2019    CR 0.90 01/08/2019     (A) 01/24/2019    JANAY 8.9 01/08/2019    ALBUMIN 3.6 01/08/2019    PROTTOTAL 7.8 01/08/2019    ALT 17 01/08/2019    AST 12 01/08/2019    ALKPHOS 94 01/08/2019    BILITOTAL 0.5 01/08/2019    TSH 3.43 01/08/2019    T4 1.08 12/13/2018       Preop Vitals  BP Readings from Last 3 Encounters:   01/15/19 127/85   01/08/19 124/82   01/08/19 131/82    Pulse Readings from Last 3 Encounters:   01/15/19 109   01/08/19 107   01/08/19 115      Resp Readings from Last 3 Encounters:   01/15/19 16   01/08/19 18   12/13/18 16    SpO2 Readings from Last 3 Encounters:   01/15/19 99%   01/08/19 98%   01/08/19 98%      Temp Readings from Last 1 Encounters:   01/15/19 37.3  C (99.1  F) (Oral)    Ht Readings from Last 1 Encounters:   01/08/19 1.676 m (5' 5.98\")      Wt Readings from Last 1 Encounters:   01/15/19 89.8 kg (198 lb)    Estimated body mass index is 31.98 kg/m  as calculated from the following:    Height as of 1/8/19: 1.676 m (5' 5.98\").    Weight as of 1/15/19: 89.8 kg (198 lb).     LDA:  Peripheral IV 10/18/18 Left Lower forearm (Active)   Number of days: 98       Peripheral IV 12/13/18 Left Lower forearm (Active)   Number of days: 42            Assessment:   ASA SCORE: 3    NPO Status: > 2 hours since completed Clear Liquids; > 6 hours since completed Solid Foods   Documentation: H&P complete   Proceeding: Proceed without further delay  Tobacco Use:  NO Active use of Tobacco/UNKNOWN Tobacco use status     Plan:   Anes. Type:  MAC   Pre-Induction: Midazolam IV   Induction:  " IV (Standard)   Airway: Native Airway   Access/Monitoring: PIV   Maintenance: Propofol; IV   Emergence: Procedure Site   Logistics: Same Day Surgery     Postop Pain/Sedation Strategy:  Standard-Options: Opioids PRN     PONV Management:  Adult Risk Factors: Female, H/o PONV or Motion Sickness, Non-Smoker, Postop Opioids  Prevention: Propofol Infusion; Ondansetron     CONSENT: Direct conversation   Plan and risks discussed with: Patient                            Jen Carvajal MD

## 2019-01-25 NOTE — ANESTHESIA POSTPROCEDURE EVALUATION
Anesthesia POST Procedure Evaluation    Patient: Khloe Tamez   MRN:     [id number] Gender:   female   Age:    63 year old :      1955        Preoperative Diagnosis: Melanoma Of Anus   Procedure(s):  Local Trans anal excision Of recurrent Anal Melanoma  Proctoscopy   Postop Comments: No value filed.       Anesthesia Type:  MAC    Reportable Event: NO     PAIN: Uncomplicated   Sign Out status: Comfortable, Well controlled pain     PONV: No PONV   Sign Out status:  No Nausea or Vomiting     Neuro/Psych: Uneventful perioperative course   Sign Out Status: Preoperative baseline; Age appropriate mentation     Airway/Resp.: Uneventful perioperative course   Sign Out Status: Non labored breathing, age appropriate RR; Resp. Status within EXPECTED Parameters     CV: Uneventful perioperative course   Sign Out status: Appropriate BP and perfusion indices; Appropriate HR/Rhythm     Disposition:   Sign Out in:  Phase II  Disposition:  Phase II; Home  Recovery Course: Uneventful  Follow-Up: Not required           Last Anesthesia Record Vitals:  CRNA VITALS  2019 1604 - 2019 1650      2019             NIBP:  102/71    Ht Rate:  99    SpO2:  95 %    Resp Rate (observed):  20    EKG:  NSR          Last PACU/Preop Vitals:  Vitals:    19 1131   BP: 132/82   Pulse: 112   Temp: 36.8  C (98.2  F)   SpO2: 98%         Electronically Signed By: Amadeo Birch MD, 2019, 4:50 PM

## 2019-01-25 NOTE — ANESTHESIA CARE TRANSFER NOTE
Patient: Khloe Tamez    Procedure(s):  Local Trans anal excision Of recurrent Anal Melanoma  Proctoscopy    Diagnosis: Melanoma Of Anus  Diagnosis Additional Information: No value filed.    Anesthesia Type:   No value filed.     Note:  Airway :Face Mask  Patient transferred to:Phase II  Handoff Report: Identifed the Patient, Identified the Reponsible Provider, Reviewed the pertinent medical history, Discussed the surgical course, Reviewed Intra-OP anesthesia mangement and issues during anesthesia, Set expectations for post-procedure period and Allowed opportunity for questions and acknowledgement of understanding      Vitals: (Last set prior to Anesthesia Care Transfer)    CRNA VITALS  1/25/2019 1604 - 1/25/2019 1634      1/25/2019             NIBP:  102/71    SpO2:  95 %    Resp Rate (observed):  20    EKG:  NSR                Electronically Signed By: JELENA Franklin CRNA  January 25, 2019  4:34 PM

## 2019-01-27 NOTE — OP NOTE
Procedure Date: 01/25/2019      PREOPERATIVE DIAGNOSIS:  Anal melanoma.      POSTOPERATIVE DIAGNOSIS:  Anal melanoma.      PROCEDURE:  Transanal excision of anal melanoma.      SURGEON:  Ronald Wright MD      ASSISTANT:  Yuli Almanzar MD      HISTORY:  This 63-year-old woman presented on 03/2018 with what proved to be a 3.5 mm deep melanoma.  This had been previously excised with a positive deep margin.  I performed a wide local reexcision of the scar that had negative margins on 04/13/2018.  There was no disease in the anal canal at that time.  On 5/2/2018, she underwent a femoral sentinel lymph node biopsy by Dr. Shah and 2 nodes were positive.  She has been followed by Dr. Murphy Valentine, and has been treated with Nivolumab.  A CT scan in 06/2018 showed possible pulmonary and right gastrocnemius muscle metastases.  She developed bloody diarrhea about a month ago and on office examination, she was found to have a mass in the left posterior anus.  A biopsy confirmed this to be recurrent melanoma.  I advised local excision for local control in hopes of instituting subsequent immunotherapy.  Based upon the question of metastatic disease and the patient's prior positive nodes, I do not think an abdominal perineal resection will be appropriate.  I discussed this plan with Dr. Valentine, who is in agreement.  I discussed the alternatives with the patient, as well as the surgical risks.  We specifically discussed possible alterations in continence related to sphincter muscle division or injury.  I answered all the patient's questions to her stated satisfaction.  She expressed understanding and provided informed consent.        DESCRIPTION OF PROCEDURE:  With the patient in the prone jackknife position and the buttocks taped apart, under intravenous sedation by Anesthesia, the perianal skin was prepped and draped in sterile fashion.  A timeout was performed and the patient and procedure confirmed.  Local infiltration of  0.5% Marcaine with epinephrine was utilized and a satisfactory perianal block was obtained.  We placed a [place] retractor.  Digital rectal examination showed a 3 x 4 cm pigmented mass in the left posterolateral quadrant as had been noted on recent office examination.  We marked out roughly 5 mm margins for resection circumferentially.  The tumor was excised in a partial thickness manner, leaving the internal sphincter intact but taking a circular muscle fibers of the rectum beneath the more proximal tumor.  Two of the specimens was pinned out and sent for pathology.  We closed the wound by advancing the proximal mucosa to the distal line of excision, which extended to the dentate line.  At the end of the excision, this extended all the way from the left anterior to the right lateral positions of the anus.  We closed the wound with multiple 2-0 and 3-0 Vicryls.  After the closure was complete, I could feel a patent lumen well beyond my closure.  We performed proctoscopy to 15 cm.  There were no additional lesions and the lumen was widely patent.  A fluff dressing was then applied and the procedure terminated.      Estimated blood loss was 10 mL.  The patient tolerated the procedure well without evident complications.  Sponge, needle and instrument counts were reported as correct at the close of the case x2.         LEA DAMON MD             D: 2019   T: 2019   MT: KETAN      Name:     ROXANNA ASTUDILLO   MRN:      [id number]        Account:        [id number]   :      1955           Procedure Date: 2019      Document: [id number]       cc: Nigel PRADO MD       [facility] Billing

## 2019-02-05 NOTE — PROGRESS NOTES
Patient's  called this RN today.  Patient is suffering with increased confusion, can no longer drive and is having difficulty with ADL's.  Dr. Valentine consulted.  MRI brain ordered and scheduled for Monday 2/11 prior to appt. With MD.  Family will stay at [facility] the night before due to the long drive.  Accommodations arranged.    Linda Sanchez MSN, RN, OCN  RN Care Coordinator  [facility]  [phone]

## 2019-02-11 NOTE — PROGRESS NOTES
Colon and Rectal Surgery Postoperative Clinic Note    RE: Khloe Tamez  : 1955  TATO: 2019    Khloe Tamez is a very pleasant 63 year old female with recurrent anal melanoma now status post transanal excision of anal melanoma on 2019 with Dr. Wright.  She presents today with her  for follow up.    Interval history: Roseline has been doing well but has been having increasing difficulty with her memory.  Her  reports that she was slightly forgetful before surgery but this has gotten significantly worse since surgery.  She had an MRI of her brain today and will be seeing Dr. Whitehead date today.  She denies any pain and is not taking any pain medications.  She is drinking a lot of water and her appetite has returned.  She reports she is voiding without difficulty.  She denies any difficulty breathing or cough.  She is having bowel movements without difficulty.  She has slight leakage of stool but states that this is only a smear.  She feels that this is similar to before surgery but her  feels that it slightly worse.    Assessment/Plan:  63 year old female status post transanal excision of anal melanoma on 2019 with Dr. Wright.      FINAL DIAGNOSIS:   ANAL MASS, EXCISION:   - Residual/recurrent malignant melanoma, ulcerated, depth 7 mm   - No evidence of lymphovascular invasion   - The tumor is completely excised, it is 0.2 mm from deep margin, 2 mm   from 9:00-12:00 and 6:00-9:00 margins,   3 mm from 3:00-6:00 margin and 4 mm from 12:00-3:00 margin     Roseline took an Ativan prior to her appointment with me today for her melanoma.  It is difficult to determine if her memory difficulty during her visit today is due to the Ativan or due to her reported worsening memory problems since surgery.  Brain MRI was normal.  Will check UA, CBC, and BMP today as well.  If this does not improve, recommended that she follow-up with primary care.  Also asked her to notify the clinic if her  fecal incontinence worsens.  Would like to set her up for follow-up visit with Dr. Wright but they would like to wait until the have their next infusion scheduled to try to coordinate the 2 of these since they drive quite a distance to come here.  They will contact our clinic to set up a follow-up appointment.  She will be seeing Dr. Meme ayala today.    Medical history:  Past Medical History:   Diagnosis Date     Melanoma malignant, perianal skin (H)      Motion sickness      Obesity (BMI 30.0-34.9)      Osteoarthritis      PONV (postoperative nausea and vomiting)        Surgical history:  Past Surgical History:   Procedure Laterality Date     BIOPSY NODE SENTINEL Left 2018    Procedure: BIOPSY NODE SENTINEL;  [place] Lymph Node Biopsy x3, Lymphatic mapping;  Surgeon: Adams Shah MD;  [facility]      SECTION       COLONOSCOPY       EXAM UNDER ANESTHESIA ANUS N/A 2018    Procedure: EXAM UNDER ANESTHESIA ANUS;  Examination Under Anesthesia Anus, Local Excision of Perianal Melanoma;  Surgeon: Ronald Wright MD;  [facility]     EXAM UNDER ANESTHESIA, EXCISE LESION RECTUM, COMBINED N/A 2019    Procedure: Local Trans anal excision Of recurrent Anal Melanoma;  Surgeon: Ronald Wright MD;  [facility]     EXCISE LESION RECTUM N/A 2018    Procedure: EXCISE LESION RECTUM;;  Surgeon: Ronald Wright MD;  [facility]     PROCTOSCOPY N/A 2019    Procedure: Proctoscopy;  Surgeon: Ronald Wright MD;  [facility]       Problem list:  Patient Active Problem List    Diagnosis Date Noted     Melanoma of anus (H) 2018     Priority: Medium       Medications:  Current Outpatient Medications   Medication Sig Dispense Refill     levothyroxine (SYNTHROID/LEVOTHROID) 125 MCG tablet Take 1 tablet (125 mcg) by mouth daily 30 tablet 1     acetaminophen (TYLENOL ARTHRITIS PAIN) 650 MG CR tablet Take 650 mg by mouth every 6 hours as needed for mild pain or fever        acetaminophen (TYLENOL) 325 MG tablet Take 2 tablets (650 mg) by mouth every 4 hours as needed for mild pain (Patient not taking: Reported on 2/11/2019) 50 tablet 0     benzonatate (TESSALON) 100 MG capsule Take 1 capsule (100 mg) by mouth 2 times daily as needed for cough (Patient not taking: Reported on 2/11/2019) 60 capsule 3     calcium carbonate (OS-JANAY 500 MG [place]. [place]) 1250 MG tablet Take 1 tablet by mouth every evening        DIPHENHYDRAMINE HCL PO Take 25 mg by mouth nightly as needed       guaiFENesin (MUCINEX) 600 MG 12 hr tablet Take 1,200 mg by mouth 2 times daily as needed        LORazepam (ATIVAN) 0.5 MG tablet Take 1 tablet (0.5 mg) by mouth every 4 hours as needed (Anxiety, Nausea/Vomiting or Sleep) (Patient not taking: Reported on 2/11/2019) 30 tablet 3     MAGNESIUM PO Take by mouth At Bedtime       naproxen (NAPROSYN) 250 MG tablet Take 1 tablet (250 mg) by mouth 2 times daily (with meals) (Patient not taking: Reported on 2/11/2019) 60 tablet 0     polyethylene glycol (MIRALAX/GLYCOLAX) packet Take 1 packet by mouth daily       prochlorperazine (COMPAZINE) 10 MG tablet Take 1 tablet (10 mg) by mouth every 6 hours as needed (Nausea/Vomiting) (Patient not taking: Reported on 2/11/2019) 30 tablet 3     Psyllium (METAMUCIL PO) Take 1 packet by mouth       VITAMIN D, CHOLECALCIFEROL, PO Take by mouth every evening          Allergies:  Allergies   Allergen Reactions     Contrast Dye Rash     Pt. Stated rash on chest and itching after most recent CT scan with contrast.     Bee Venom Other (See Comments)     Rapid heart rate       Family history:  No family history on file.    Social history:  Social History     Tobacco Use     Smoking status: Never Smoker     Smokeless tobacco: Never Used   Substance Use Topics     Alcohol use: No     Marital status: .    Nursing Notes:   Mat Avery, EMT  2/11/2019  9:12 AM  Signed  Chief Complaint   Patient presents with     Surgical Followup     Post  "operation visit.       Vitals:    02/11/19 0904   BP: 131/83   Pulse: 100   Temp: 98.3  F (36.8  C)   TempSrc: Oral   SpO2: 99%   Height: 1.702 m (5' 7\")       Body mass index is 29.69 kg/m .      Mat Avery, EMT                         Physical Examination:   /83   Pulse 100   Temp 98.3  F (36.8  C) (Oral)   Ht 5' 7\"   SpO2 99%   BMI 29.69 kg/m    General: alert, oriented, in no acute distress, sitting comfortably. Forgetful but alert and oriented X3.    JELENA Champagne, NP-C  Colon and Rectal Surgery  [facility]    This note was created using speech recognition software and may contain unintended word substitutions.    Addendum: UA positive. Will treat with oral antibiotics but follow up with PCP if symptoms persist.  "

## 2019-02-11 NOTE — PROGRESS NOTES
[facility]  MEDICAL ONCOLOGY PROGRESS NOTE  Feb 11, 2019    CHIEF COMPLAINT: Perianal melanoma    Melanoma History:.  1. 3/27/18 patient had perianal lesion biopsied in [place] ([id number]). This showed malignant melanoma, positive for HMB45 and S100, and negative for CD45 and pancytokeratin. Breslow depth at least 3.5 mm, non-ulcerated, with 5 mitoses/mm2, TILs present with deep and peripheral margins positive; pT3a.  2. 3/29/18, PET-CT showed a perianal FDG avid lesion (SUV max 10.8) and a superficial left inguinal lymph node measuring 0.9 cm (SUV max 4.1).  3. 4/13/18, she has wide local excision of a 2 x 2.5 cm deep perianal melanoma with anaplastic closure, under Dr. Wright. Pathology showed a nodular melanoma, Breslow depth 6.0 mm to mark's level IV, there were more than 10 mitoses per mm2, with ulceration, and lymphovascular invasion present. No perineural invasion or microsatellitosis. Tumor infiltrating lymphocytes present, non-brisk. AJCC microstage: pT4b.  4. 4/26/18, she has MRI brain, which is negative for metastatic disease.  5. 5/2/18, she returns to OR under Dr. Shah for left femoral sentinel lymph node biopsy, with 2 (of 3) involved lymph nodes. IHC positive for Melan-A, tyrosinase, and HMB-45. No definite extranodal extension.  6  5/31/18, she begins treatment with adjuvant nivolumab  7. 6/11/18, [facility] testing shows PD-L1 0% by IHC, with microsatellite stability (MARY LOU) and tumor mutational burden low at 4 per Mb DNA. Mutations seen in STK11 V4fs*160, BRAF D594G, SF3B1 R625H and KIT amplification.  8. 7/2/18, colonosocopy with diverticulosis and one sessile serrated adenoma and two tubular adenomas.  9. 11/15/18, CT-CAP showed no evidence of metastatic disease.  10. 12/19/18, she has bloody loose stool and seen in ER with CT-scan showing a small region of circumferential wall thickening around the rectosigmoid junction.  11. 1/8/19, she sees Dr. Wright. He performed  anoscopy, which showed a 2.5 cm lesion in the left posterior postiion with central depression. Biopsies taken (Specimen #: ) and pathology returns recurrent melanoma.  12. 1/15/19, MRI pelvis obtained, which showed lower rectal 4 cm enhancing lobulated mass, also involving the anal verge, consistent with biopsy-proven anal melanoma.  It abuts the  internal anal sphincter. There are also scattered adjacent perirectal lymph nodes some of them with suspicious morphology.  13. 1/24/19, she has PET-CT scan, which showed the 4 cm rectal mass, an FDG avid right inguinal lymph node, and a nodular focus of FDG uptake within the galllbadder. There is also a 5 mm solid nodule in the right lower lobe, which has increased from 3 mm on 1/24/19. Remainder of sub 1-cm nodules stable in appearance.  14. 1/25/19, she has transanal excision of recurrent anal melanoma, under Dr. Wright. Surgical pathology (Specimen #: [id number]) showed an ulcerated melanoma lesion to depth 7 mm without evidence of lymphovascular invasion. The lesion appeared completely resected.      HISTORY OF PRESENT ILLNESS  Khloe Tamez is a 63 year old female from [place], [place] with resected perianal melanoma. She presents today for re-evaluation following recent transanal excision of recurrent melanoma in the rectum, under Dr. Wright. She tells me the rectal bleeding has stopped following her surgery.    Unfortunately, however, patient has continued to feel weak, intermittently confused, and has had issues with short term memory loss and myoclonic jerking. She was recently started on levothyroxine for hypothyroidism and feels her symptoms started around the time she had to start thyroid supplementation in August. However, her  notes it has gotten much worse since January of this year. TSH today is 1.63.    She had MRI brain today to assess for the neurologic decline, and there is no evidence of intracranial metastatic disease.     Other than  the memory complaints, weakness, and myoclonus she endorses poor appetite and recent weight loss. She denies cough, shortness of breath, nausea, vomiting, or abdominal pain. She has had no fevers or chills. No new palpable lumps or bumps.    Labs today are significant for urinalysis showing blood, large leukocyte esterase, 102 WBCs, 11 RBCs, few bacteria and presence of 3 transitional epithelial cells. However, culture is showing mixed urogenital oracio. CBC shows WBC 9.9, hemoglobin 13.9, platelet count 384. CMP shows normal electrolytes, normal renal and liver function. Albumin is 3.2.    ECOG performance status is 2.      REVIEW OF SYSTEMS  A 12-point ROS negative except as in HPI.    Current Outpatient Medications   Medication     acetaminophen (TYLENOL ARTHRITIS PAIN) 650 MG CR tablet     acetaminophen (TYLENOL) 325 MG tablet     levothyroxine (SYNTHROID/LEVOTHROID) 125 MCG tablet     LORazepam (ATIVAN) 0.5 MG tablet     prochlorperazine (COMPAZINE) 10 MG tablet     benzonatate (TESSALON) 100 MG capsule     calcium carbonate (OS-JANAY 500 MG [place]. [place]) 1250 MG tablet     DIPHENHYDRAMINE HCL PO     guaiFENesin (MUCINEX) 600 MG 12 hr tablet     MAGNESIUM PO     naproxen (NAPROSYN) 250 MG tablet     polyethylene glycol (MIRALAX/GLYCOLAX) packet     Psyllium (METAMUCIL PO)     sulfamethoxazole-trimethoprim (BACTRIM DS/SEPTRA DS) 800-160 MG tablet     VITAMIN D, CHOLECALCIFEROL, PO     No current facility-administered medications for this visit.        Past Medical History:   Diagnosis Date     Melanoma malignant, perianal skin (H)      Motion sickness      Obesity (BMI 30.0-34.9)      Osteoarthritis      PONV (postoperative nausea and vomiting)      Past Surgical History:   Procedure Laterality Date     BIOPSY NODE SENTINEL Left 2018    Procedure: BIOPSY NODE SENTINEL;  [place] Lymph Node Biopsy x3, Lymphatic mapping;  Surgeon: Adams Shah MD;  [facility]      SECTION       COLONOSCOPY       EXAM  "UNDER ANESTHESIA ANUS N/A 4/13/2018    Procedure: EXAM UNDER ANESTHESIA ANUS;  Examination Under Anesthesia Anus, Local Excision of Perianal Melanoma;  Surgeon: Ronald Wright MD;  [facility]     EXAM UNDER ANESTHESIA, EXCISE LESION RECTUM, COMBINED N/A 1/25/2019    Procedure: Local Trans anal excision Of recurrent Anal Melanoma;  Surgeon: Ronald Wright MD;  [facility]     EXCISE LESION RECTUM N/A 4/13/2018    Procedure: EXCISE LESION RECTUM;;  Surgeon: Ronald Wright MD;  [facility]     PROCTOSCOPY N/A 1/25/2019    Procedure: Proctoscopy;  Surgeon: Ronald Wright MD;  [facility]       SOCIAL HISTORY  She has worked as a caregiver, currently works at assisted living facility. Lifelong nonsmoker. No significant alcohol. She has twins.      FAMILY HISTORY  Father had a skin cancer, unsure if melanoma. Paternal aunt had stomach cancer.    PHYSICAL EXAMINATION  /83   Pulse 100   Temp 98.3  F (36.8  C) (Oral)   Ht 1.702 m (5' 7.01\")   SpO2 99%   BMI 29.69 kg/m      Physical Exam   Constitutional: She is oriented to person, place, and time. She appears well-developed and well-nourished.   HENT:   Mouth/Throat: Oropharynx is clear and moist.   Eyes: EOM are normal. Pupils are equal, round, and reactive to light. No scleral icterus.   Neck: Normal range of motion. Neck supple.   Cardiovascular: Regular rhythm.   Pulmonary/Chest: Effort normal and breath sounds normal. She has no wheezes.   Abdominal: Soft. She exhibits no mass.   Musculoskeletal: Normal range of motion. She exhibits no edema or tenderness.   Neurological: She is alert and oriented to person, place, and time. No cranial nerve deficit or sensory deficit.   There is verbal slowness and she appears to forget what she is saying mid speech at times.  Symmetric 4/5 weakness. No sensory changes.   Skin: Skin is warm and dry. No rash noted. No pallor.   Psychiatric: She has a normal mood and affect. Thought content normal. "   Nursing note and vitals reviewed.      ASSESSMENT AND PLAN  #1 Recurrent metastatic melanoma to bowel,  lymph nodes, gall bladder, and lung  #2 Resected Stage IIIC nodular melanoma, pT4b pN2b, perianal primary  It was a pleasure to see Ms. Tamez today. She is a 63 year old woman with metastatic anal melanoma. She was on adjuvant nivolumab and she has shown definitive evidence of recurrence on nivolumab. PET-CT suggests metastasis to several additional sites.    We reviewed that her melanoma is PD-L1 negative, making it less likely that she would respond to PD1 inhibitor monotherapy for metastatic disease. I reviewed the option for standard therapy at this time would be dual checkpoint inhibition with ipilimumab and nivolumab. This is associated with increases in response rates and overall survival, however, responses are generally somewhat lower for mucosal and anal melanoma primary tumors. I would anticipate a response rate of 40% with dual checkpoint inhibition.    She agrees to proceed. We will arrange and get scheduled. Will see her back next week to begin.    #3 Deconditioning  #4 Malnutrition  We will need to work to improve her strength and her nutrition for consideration of clinical trials down the line if Ipi/Nivo were to be ineffective. She may benefit from physical rehab and nutrition consultation. Will discuss on her return visit next week.    #5 Hypothyroidism  TSH is currently 1.63. Maintain current dose of 125 mcg daily.    #6 Verbal and memory impairments  #7 Myoclonic jerking  It is not clear what the cause of her neurologic worsening is at present. I will see her back in 1 week if there is no improvement will refer to neurology for work-up.    #8 Urinary tract infection, presumed cystitis  Bactrim was sent earlier today by surgery.       Multiple questions answered.    Nigel Person M.D.   of Medicine  Hematology, Oncology and Transplantation

## 2019-02-11 NOTE — NURSING NOTE
"Oncology Rooming Note    February 11, 2019 11:33 AM   Khloe Tamez is a 63 year old female who presents for:    Chief Complaint   Patient presents with     Labs Only     venipuncture, vitals checked     Oncology Clinic Visit     Return; Melanoma     Initial Vitals: /83   Pulse 100   Temp 98.3  F (36.8  C) (Oral)   Ht 1.702 m (5' 7.01\")   SpO2 99%   BMI 29.69 kg/m   Estimated body mass index is 29.69 kg/m  as calculated from the following:    Height as of this encounter: 1.702 m (5' 7.01\").    Weight as of 1/25/19: 86 kg (189 lb 9.5 oz). Body surface area is 2.02 meters squared.  No Pain (0) Comment: Data Unavailable   No LMP recorded. Patient is postmenopausal.  Allergies reviewed: Yes  Medications reviewed: Yes    Medications: Medication Refills needed today. Meme Notified.  Pharmacy name entered into [facility]:    [id number] - [facility] [address] [place] [address] [address]  [facility] [address] [place] [address]    Clinical concerns: Patient requests a refill of Levothyroxine today. Patient has been very tired, shaky/twitchy and has been experiencing memory lapses. Patient states that the memory lapses are frustrating and scary for her, and that it has been impacting her sleep. She is having weird dreams. Meme was notified.    7 minutes for nursing intake (face to face time)     Candi Rene MA              "

## 2019-02-11 NOTE — LETTER
2019       RE: Khloe Tamez  [id number] [facility] [address]     Dear Colleague,    Thank you for referring your patient, Khloe Tamez, to the [facility] COLON AND RECTAL SURGERY at [facility]. Please see a copy of my visit note below.    Colon and Rectal Surgery Postoperative Clinic Note    RE: Khloe Tamez  : 1955  TATO: 2019    Khloe Tamez is a very pleasant 63 year old female with recurrent anal melanoma now status post transanal excision of anal melanoma on 2019 with Dr. Wright.  She presents today with her  for follow up.    Interval history: Roseline has been doing well but has been having increasing difficulty with her memory.  Her  reports that she was slightly forgetful before surgery but this has gotten significantly worse since surgery.  She had an MRI of her brain today and will be seeing Dr. Whitehead date today.  She denies any pain and is not taking any pain medications.  She is drinking a lot of water and her appetite has returned.  She reports she is voiding without difficulty.  She denies any difficulty breathing or cough.  She is having bowel movements without difficulty.  She has slight leakage of stool but states that this is only a smear.  She feels that this is similar to before surgery but her  feels that it slightly worse.    Assessment/Plan:  63 year old female status post transanal excision of anal melanoma on 2019 with Dr. Wright.      FINAL DIAGNOSIS:   ANAL MASS, EXCISION:   - Residual/recurrent malignant melanoma, ulcerated, depth 7 mm   - No evidence of lymphovascular invasion   - The tumor is completely excised, it is 0.2 mm from deep margin, 2 mm   from 9:00-12:00 and 6:00-9:00 margins,   3 mm from 3:00-6:00 margin and 4 mm from 12:00-3:00 margin     Roseline took an Ativan prior to her appointment with me today for her melanoma.  It is difficult to determine if her memory difficulty  during her visit today is due to the Ativan or due to her reported worsening memory problems since surgery.  Brain MRI was normal.  Will check UA, CBC, and BMP today as well.  If this does not improve, recommended that she follow-up with primary care.  Also asked her to notify the clinic if her fecal incontinence worsens.  Would like to set her up for follow-up visit with Dr. Wright but they would like to wait until the have their next infusion scheduled to try to coordinate the 2 of these since they drive quite a distance to come here.  They will contact our clinic to set up a follow-up appointment.  She will be seeing Dr. Meme ayala today.    Medical history:  Past Medical History:   Diagnosis Date     Melanoma malignant, perianal skin (H)      Motion sickness      Obesity (BMI 30.0-34.9)      Osteoarthritis      PONV (postoperative nausea and vomiting)        Surgical history:  Past Surgical History:   Procedure Laterality Date     BIOPSY NODE SENTINEL Left 2018    Procedure: BIOPSY NODE SENTINEL;  [place] Lymph Node Biopsy x3, Lymphatic mapping;  Surgeon: Adams Shah MD;  [facility]      SECTION       COLONOSCOPY       EXAM UNDER ANESTHESIA ANUS N/A 2018    Procedure: EXAM UNDER ANESTHESIA ANUS;  Examination Under Anesthesia Anus, Local Excision of Perianal Melanoma;  Surgeon: Ronald Wright MD;  [facility]     EXAM UNDER ANESTHESIA, EXCISE LESION RECTUM, COMBINED N/A 2019    Procedure: Local Trans anal excision Of recurrent Anal Melanoma;  Surgeon: Ronald Wright MD;  [facility]     EXCISE LESION RECTUM N/A 2018    Procedure: EXCISE LESION RECTUM;;  Surgeon: Ronald Wright MD;  [facility]     PROCTOSCOPY N/A 2019    Procedure: Proctoscopy;  Surgeon: Ronald Wright MD;  [facility]       Problem list:  Patient Active Problem List    Diagnosis Date Noted     Melanoma of anus (H) 2018     Priority: Medium       Medications:  Current  Outpatient Medications   Medication Sig Dispense Refill     levothyroxine (SYNTHROID/LEVOTHROID) 125 MCG tablet Take 1 tablet (125 mcg) by mouth daily 30 tablet 1     acetaminophen (TYLENOL ARTHRITIS PAIN) 650 MG CR tablet Take 650 mg by mouth every 6 hours as needed for mild pain or fever       acetaminophen (TYLENOL) 325 MG tablet Take 2 tablets (650 mg) by mouth every 4 hours as needed for mild pain (Patient not taking: Reported on 2/11/2019) 50 tablet 0     benzonatate (TESSALON) 100 MG capsule Take 1 capsule (100 mg) by mouth 2 times daily as needed for cough (Patient not taking: Reported on 2/11/2019) 60 capsule 3     calcium carbonate (OS-JANAY 500 MG [place]. CA) 1250 MG tablet Take 1 tablet by mouth every evening        DIPHENHYDRAMINE HCL PO Take 25 mg by mouth nightly as needed       guaiFENesin (MUCINEX) 600 MG 12 hr tablet Take 1,200 mg by mouth 2 times daily as needed        LORazepam (ATIVAN) 0.5 MG tablet Take 1 tablet (0.5 mg) by mouth every 4 hours as needed (Anxiety, Nausea/Vomiting or Sleep) (Patient not taking: Reported on 2/11/2019) 30 tablet 3     MAGNESIUM PO Take by mouth At Bedtime       naproxen (NAPROSYN) 250 MG tablet Take 1 tablet (250 mg) by mouth 2 times daily (with meals) (Patient not taking: Reported on 2/11/2019) 60 tablet 0     polyethylene glycol (MIRALAX/GLYCOLAX) packet Take 1 packet by mouth daily       prochlorperazine (COMPAZINE) 10 MG tablet Take 1 tablet (10 mg) by mouth every 6 hours as needed (Nausea/Vomiting) (Patient not taking: Reported on 2/11/2019) 30 tablet 3     Psyllium (METAMUCIL PO) Take 1 packet by mouth       VITAMIN D, CHOLECALCIFEROL, PO Take by mouth every evening          Allergies:  Allergies   Allergen Reactions     Contrast Dye Rash     Pt. Stated rash on chest and itching after most recent CT scan with contrast.     Bee Venom Other (See Comments)     Rapid heart rate       Family history:  No family history on file.    Social history:  Social History  "    Tobacco Use     Smoking status: Never Smoker     Smokeless tobacco: Never Used   Substance Use Topics     Alcohol use: No     Marital status: .    Nursing Notes:   Mat Avery, EMT  2/11/2019  9:12 AM  Signed  Chief Complaint   Patient presents with     Surgical Followup     Post operation visit.       Vitals:    02/11/19 0904   BP: 131/83   Pulse: 100   Temp: 98.3  F (36.8  C)   TempSrc: Oral   SpO2: 99%   Height: 1.702 m (5' 7\")       Body mass index is 29.69 kg/m .      ALIZE Saldaña     Physical Examination:   /83   Pulse 100   Temp 98.3  F (36.8  C) (Oral)   Ht 5' 7\"   SpO2 99%   BMI 29.69 kg/m     General: alert, oriented, in no acute distress, sitting comfortably. Forgetful but alert and oriented X3.    This note was created using speech recognition software and may contain unintended word substitutions.    Addendum: UA positive. Will treat with oral antibiotics but follow up with PCP if symptoms persist.      Again, thank you for allowing me to participate in the care of your patient.      Sincerely,    JELENA Rodriguez CNP      "

## 2019-02-11 NOTE — LETTER
2/11/2019       RE: Khloe Tamez  [address] [facility] [address]     Dear Colleague,    Thank you for referring your patient, Khloe Tamez, to the [facility]. Please see a copy of my visit note below.    [facility]  MEDICAL ONCOLOGY PROGRESS NOTE  Feb 11, 2019    CHIEF COMPLAINT: Perianal melanoma    Melanoma History:.  1. 3/27/18 patient had perianal lesion biopsied in [place] ([id number]). This showed malignant melanoma, positive for HMB45 and S100, and negative for CD45 and pancytokeratin. Breslow depth at least 3.5 mm, non-ulcerated, with 5 mitoses/mm2, TILs present with deep and peripheral margins positive; pT3a.  2. 3/29/18, PET-CT showed a perianal FDG avid lesion (SUV max 10.8) and a superficial left inguinal lymph node measuring 0.9 cm (SUV max 4.1).  3. 4/13/18, she has wide local excision of a 2 x 2.5 cm deep perianal melanoma with anaplastic closure, under Dr. Wright. Pathology showed a nodular melanoma, Breslow depth 6.0 mm to mark's level IV, there were more than 10 mitoses per mm2, with ulceration, and lymphovascular invasion present. No perineural invasion or microsatellitosis. Tumor infiltrating lymphocytes present, non-brisk. AJCC microstage: pT4b.  4. 4/26/18, she has MRI brain, which is negative for metastatic disease.  5. 5/2/18, she returns to OR under Dr. Shah for left femoral sentinel lymph node biopsy, with 2 (of 3) involved lymph nodes. IHC positive for Melan-A, tyrosinase, and HMB-45. No definite extranodal extension.  6  5/31/18, she begins treatment with adjuvant nivolumab  7. 6/11/18, [employer] testing shows PD-L1 0% by IHC, with microsatellite stability (MARY LOU) and tumor mutational burden low at 4 per Mb DNA. Mutations seen in STK11 V4fs*160, BRAF D594G, SF3B1 R625H and KIT amplification.  8. 7/2/18, colonosocopy with diverticulosis and one sessile serrated adenoma and two tubular adenomas.  9. 11/15/18, CT-CAP showed no evidence  of metastatic disease.  10. 12/19/18, she has bloody loose stool and seen in ER with CT-scan showing a small region of circumferential wall thickening around the rectosigmoid junction.  11. 1/8/19, she sees Dr. Wright. He performed anoscopy, which showed a 2.5 cm lesion in the left posterior postiion with central depression. Biopsies taken (Specimen #: ) and pathology returns recurrent melanoma.  12. 1/15/19, MRI pelvis obtained, which showed lower rectal 4 cm enhancing lobulated mass, also involving the anal verge, consistent with biopsy-proven anal melanoma.  It abuts the  internal anal sphincter. There are also scattered adjacent perirectal lymph nodes some of them with suspicious morphology.  13. 1/24/19, she has PET-CT scan, which showed the 4 cm rectal mass, an FDG avid right inguinal lymph node, and a nodular focus of FDG uptake within the galllbadder. There is also a 5 mm solid nodule in the right lower lobe, which has increased from 3 mm on 1/24/19. Remainder of sub 1-cm nodules stable in appearance.  14. 1/25/19, she has transanal excision of recurrent anal melanoma, under Dr. Wright. Surgical pathology (Specimen #: [id number]) showed an ulcerated melanoma lesion to depth 7 mm without evidence of lymphovascular invasion. The lesion appeared completely resected.      HISTORY OF PRESENT ILLNESS  Khloe Tamez is a 63 year old female from [place], [place] with resected perianal melanoma. She presents today for re-evaluation following recent transanal excision of recurrent melanoma in the rectum, under Dr. Wright. She tells me the rectal bleeding has stopped following her surgery.    Unfortunately, however, patient has continued to feel weak, intermittently confused, and has had issues with short term memory loss and myoclonic jerking. She was recently started on levothyroxine for hypothyroidism and feels her symptoms started around the time she had to start thyroid supplementation in August. However,  her  notes it has gotten much worse since January of this year. TSH today is 1.63.    She had MRI brain today to assess for the neurologic decline, and there is no evidence of intracranial metastatic disease.     Other than the memory complaints, weakness, and myoclonus she endorses poor appetite and recent weight loss. She denies cough, shortness of breath, nausea, vomiting, or abdominal pain. She has had no fevers or chills. No new palpable lumps or bumps.    Labs today are significant for urinalysis showing blood, large leukocyte esterase, 102 WBCs, 11 RBCs, few bacteria and presence of 3 transitional epithelial cells. However, culture is showing mixed urogenital oracio. CBC shows WBC 9.9, hemoglobin 13.9, platelet count 384. CMP shows normal electrolytes, normal renal and liver function. Albumin is 3.2.    ECOG performance status is 2.      REVIEW OF SYSTEMS  A 12-point ROS negative except as in HPI.    Current Outpatient Medications   Medication     acetaminophen (TYLENOL ARTHRITIS PAIN) 650 MG CR tablet     acetaminophen (TYLENOL) 325 MG tablet     levothyroxine (SYNTHROID/LEVOTHROID) 125 MCG tablet     LORazepam (ATIVAN) 0.5 MG tablet     prochlorperazine (COMPAZINE) 10 MG tablet     benzonatate (TESSALON) 100 MG capsule     calcium carbonate (OS-JANAY 500 MG [place]. [place]) 1250 MG tablet     DIPHENHYDRAMINE HCL PO     guaiFENesin (MUCINEX) 600 MG 12 hr tablet     MAGNESIUM PO     naproxen (NAPROSYN) 250 MG tablet     polyethylene glycol (MIRALAX/GLYCOLAX) packet     Psyllium (METAMUCIL PO)     sulfamethoxazole-trimethoprim (BACTRIM DS/SEPTRA DS) 800-160 MG tablet     VITAMIN D, CHOLECALCIFEROL, PO     No current facility-administered medications for this visit.        Past Medical History:   Diagnosis Date     Melanoma malignant, perianal skin (H)      Motion sickness      Obesity (BMI 30.0-34.9)      Osteoarthritis      PONV (postoperative nausea and vomiting)      Past Surgical History:   Procedure  "Laterality Date     BIOPSY NODE SENTINEL Left 2018    Procedure: BIOPSY NODE SENTINEL;  [place] Lymph Node Biopsy x3, Lymphatic mapping;  Surgeon: Adams Shah MD;  [facility]      SECTION       COLONOSCOPY       EXAM UNDER ANESTHESIA ANUS N/A 2018    Procedure: EXAM UNDER ANESTHESIA ANUS;  Examination Under Anesthesia Anus, Local Excision of Perianal Melanoma;  Surgeon: Ronald Wright MD;  [facility]     EXAM UNDER ANESTHESIA, EXCISE LESION RECTUM, COMBINED N/A 2019    Procedure: Local Trans anal excision Of recurrent Anal Melanoma;  Surgeon: Ronald Wright MD;  [facility]     EXCISE LESION RECTUM N/A 2018    Procedure: EXCISE LESION RECTUM;;  Surgeon: Ronald Wright MD;  [facility]     PROCTOSCOPY N/A 2019    Procedure: Proctoscopy;  Surgeon: Ronald Wright MD;  [facility]       SOCIAL HISTORY  She has worked as a caregiver, currently works at assisted living facility. Lifelong nonsmoker. No significant alcohol. She has twins.      FAMILY HISTORY  Father had a skin cancer, unsure if melanoma. Paternal aunt had stomach cancer.    PHYSICAL EXAMINATION  /83   Pulse 100   Temp 98.3  F (36.8  C) (Oral)   Ht 1.702 m (5' 7.01\")   SpO2 99%   BMI 29.69 kg/m       Physical Exam   Constitutional: She is oriented to person, place, and time. She appears well-developed and well-nourished.   HENT:   Mouth/Throat: Oropharynx is clear and moist.   Eyes: EOM are normal. Pupils are equal, round, and reactive to light. No scleral icterus.   Neck: Normal range of motion. Neck supple.   Cardiovascular: Regular rhythm.   Pulmonary/Chest: Effort normal and breath sounds normal. She has no wheezes.   Abdominal: Soft. She exhibits no mass.   Musculoskeletal: Normal range of motion. She exhibits no edema or tenderness.   Neurological: She is alert and oriented to person, place, and time. No cranial nerve deficit or sensory deficit.   There is verbal slowness and she " appears to forget what she is saying mid speech at times.  Symmetric 4/5 weakness. No sensory changes.   Skin: Skin is warm and dry. No rash noted. No pallor.   Psychiatric: She has a normal mood and affect. Thought content normal.   Nursing note and vitals reviewed.      ASSESSMENT AND PLAN  #1 Recurrent metastatic melanoma to bowel,  lymph nodes, gall bladder, and lung  #2 Resected Stage IIIC nodular melanoma, pT4b pN2b, perianal primary  It was a pleasure to see Ms. Tamez today. She is a 63 year old woman with metastatic anal melanoma. She was on adjuvant nivolumab and she has shown definitive evidence of recurrence on nivolumab. PET-CT suggests metastasis to several additional sites.    We reviewed that her melanoma is PD-L1 negative, making it less likely that she would respond to PD1 inhibitor monotherapy for metastatic disease. I reviewed the option for standard therapy at this time would be dual checkpoint inhibition with ipilimumab and nivolumab. This is associated with increases in response rates and overall survival, however, responses are generally somewhat lower for mucosal and anal melanoma primary tumors. I would anticipate a response rate of 40% with dual checkpoint inhibition.    She agrees to proceed. We will arrange and get scheduled. Will see her back next week to begin.    #3 Deconditioning  #4 Malnutrition  We will need to work to improve her strength and her nutrition for consideration of clinical trials down the line if Ipi/Nivo were to be ineffective. She may benefit from physical rehab and nutrition consultation. Will discuss on her return visit next week.    #5 Hypothyroidism  TSH is currently 1.63. Maintain current dose of 125 mcg daily.    #6 Verbal and memory impairments  #7 Myoclonic jerking  It is not clear what the cause of her neurologic worsening is at present. I will see her back in 1 week if there is no improvement will refer to neurology for work-up.    #8 Urinary tract  infection, presumed cystitis  Bactrim was sent earlier today by surgery.     Multiple questions answered.        Nigel Valentine MD

## 2019-02-11 NOTE — NURSING NOTE
Chief Complaint   Patient presents with     Labs Only     venipuncture, vitals checked     PIV placed by Rn for infusion.sign

## 2019-02-11 NOTE — NURSING NOTE
"Chief Complaint   Patient presents with     Surgical Followup     Post operation visit.       Vitals:    02/11/19 0904   BP: 131/83   Pulse: 100   Temp: 98.3  F (36.8  C)   TempSrc: Oral   SpO2: 99%   Height: 1.702 m (5' 7\")       Body mass index is 29.69 kg/m .      Mat Avery, EMT                      "

## 2019-02-13 NOTE — TELEPHONE ENCOUNTER
[facility] Call Center    Phone Message    May a detailed message be left on voicemail: yes    Reason for Call: Other: pt needs alternative medication to prescribed Bactrim, but pt. has sevedr diartrhea when she takes that. Please place a different RX for patient and call to confirm.   Please send to [facility] in [place].    Action Taken: Message routed to:  [facility]: MEDARDO

## 2019-02-14 NOTE — PROGRESS NOTES
Memo was called in regard to the patient's diarrhea and confusion. He states the patient's diarrhea has become bothersome to the patient.  He was advised to try a dose of Imodium and if her diarrhea did not improve he could give her a second dose after 4 hours.  He was also advised this antibiotic is the specific antibiotic she needs to be on for her urinary tract infection and we could continue to monitor the diarrhea however she did need to stay on the specific antibiotic.  Maxim stated understanding of these instructions and is in agreement with this plan of care.  He will let us know how Roseline is orientation is after she has completed her course of antibiotics.

## 2019-02-27 NOTE — LETTER
2/27/2019    RE: Khloe Tamez  [address] [facility] [address]     [facility]  MEDICAL ONCOLOGY PROGRESS NOTE  Feb 27, 2019    CHIEF COMPLAINT: Perianal melanoma    Melanoma History:  1. 3/27/18 patient had perianal lesion biopsied in [place] ([id number]). This showed malignant melanoma, positive for HMB45 and S100, and negative for CD45 and pancytokeratin. Breslow depth at least 3.5 mm, non-ulcerated, with 5 mitoses/mm2, TILs present with deep and peripheral margins positive; pT3a.  2. 3/29/18, PET-CT showed a perianal FDG avid lesion (SUV max 10.8) and a superficial left inguinal lymph node measuring 0.9 cm (SUV max 4.1).  3. 4/13/18, she has wide local excision of a 2 x 2.5 cm deep perianal melanoma with anaplastic closure, under Dr. Wright. Pathology showed a nodular melanoma, Breslow depth 6.0 mm to mark's level IV, there were more than 10 mitoses per mm2, with ulceration, and lymphovascular invasion present. No perineural invasion or microsatellitosis. Tumor infiltrating lymphocytes present, non-brisk. AJCC microstage: pT4b.  4. 4/26/18, she has MRI brain, which is negative for metastatic disease.  5. 5/2/18, she returns to OR under Dr. Shah for left femoral sentinel lymph node biopsy, with 2 (of 3) involved lymph nodes. IHC positive for Melan-A, tyrosinase, and HMB-45. No definite extranodal extension.  6  5/31/18, she begins treatment with adjuvant nivolumab  7. 6/11/18, [employer] testing shows PD-L1 0% by IHC, with microsatellite stability (MARY LOU) and tumor mutational burden low at 4 per Mb DNA. Mutations seen in STK11 V4fs*160, BRAF D594G, SF3B1 R625H and KIT amplification.  8. 7/2/18, colonosocopy with diverticulosis and one sessile serrated adenoma and two tubular adenomas.  9. 11/15/18, CT-CAP showed no evidence of metastatic disease.  10. 12/19/18, she has bloody loose stool and seen in ER with CT-scan showing a small region of circumferential wall thickening around the  rectosigmoid junction.  11. 1/8/19, she sees Dr. Wright. He performed anoscopy, which showed a 2.5 cm lesion in the left posterior postiion with central depression. Biopsies taken (Specimen #: ) and pathology returns recurrent melanoma.  12. 1/15/19, MRI pelvis obtained, which showed lower rectal 4 cm enhancing lobulated mass, also involving the anal verge, consistent with biopsy-proven anal melanoma.  It abuts the  internal anal sphincter. There are also scattered adjacent perirectal lymph nodes some of them with suspicious morphology.  13. 1/24/19, she has PET-CT scan, which showed the 4 cm rectal mass, an FDG avid right inguinal lymph node, and a nodular focus of FDG uptake within the galllbadder. There is also a 5 mm solid nodule in the right lower lobe, which has increased from 3 mm on 1/24/19. Remainder of sub 1-cm nodules stable in appearance.  14. 1/25/19, she has transanal excision of recurrent anal melanoma, under Dr. Wright. Surgical pathology (Specimen #: [id number]) showed an ulcerated melanoma lesion to depth 7 mm without evidence of lymphovascular invasion. The lesion appeared completely resected.    HISTORY OF PRESENT ILLNESS  Khloe Tamez is a 63 year old female from [place], [place] with resected perianal melanoma. She presents today for re-evaluation following recent transanal excision of recurrent melanoma in the rectum, under Dr. Wright.  Patient reports that she feels lightheaded with position changes and when her eyes see rapid movements.  She finds the lightheadedness limits her ability to walk.  This is gotten worse over the last 3 weeks.  Her  also notices that she twitches while sleeping.  She did complete antibiotics a few days ago for a UTI.  She reports that her shakiness was worse on the Bactrim.  She also felt like her skin and mouth were burning and she had some red bumps on her skin.  Her  notes that her confusion does seem to be better since completing the  course of antibiotics.  She reports eating okay.  She is drinking at least 3 bottles of 16 ounce water per day.  She has noticed some increase in the swelling in both of her feet and legs over the last few days.  She is trying to use some compression stockings and elevating her legs.  She denies any pain with this.  She denies any chest pain, dyspnea, or orthopnea.  She reports that her skin and lips felt itchy last week but this has since improved.  She notes that she easily gets constipated and is trying to manage this with fiber in her diet as well as Metamucil.  She denies other concerns.    ECOG performance status is 2.    Current Outpatient Medications   Medication     acetaminophen (TYLENOL ARTHRITIS PAIN) 650 MG CR tablet     levothyroxine (SYNTHROID/LEVOTHROID) 125 MCG tablet     VITAMIN D, CHOLECALCIFEROL, PO     benzonatate (TESSALON) 100 MG capsule     calcium carbonate (OS-JANAY 500 MG [place]. [place]) 1250 MG tablet     DIPHENHYDRAMINE HCL PO     guaiFENesin (MUCINEX) 600 MG 12 hr tablet     LORazepam (ATIVAN) 0.5 MG tablet     LORazepam (ATIVAN) 0.5 MG tablet     MAGNESIUM PO     polyethylene glycol (MIRALAX/GLYCOLAX) packet     prochlorperazine (COMPAZINE) 10 MG tablet     prochlorperazine (COMPAZINE) 10 MG tablet     Psyllium (METAMUCIL PO)     No current facility-administered medications for this visit.        Past Medical History:   Diagnosis Date     Melanoma malignant, perianal skin (H)      Motion sickness      Obesity (BMI 30.0-34.9)      Osteoarthritis      PONV (postoperative nausea and vomiting)      Past Surgical History:   Procedure Laterality Date     BIOPSY NODE SENTINEL Left 2018    Procedure: BIOPSY NODE SENTINEL;  [place] Lymph Node Biopsy x3, Lymphatic mapping;  Surgeon: Adams Shah MD;  [facility]      SECTION       COLONOSCOPY       EXAM UNDER ANESTHESIA ANUS N/A 2018    Procedure: EXAM UNDER ANESTHESIA ANUS;  Examination Under Anesthesia Anus, Local Excision of  "Perianal Melanoma;  Surgeon: Ronald Wright MD;  [facility]     EXAM UNDER ANESTHESIA, EXCISE LESION RECTUM, COMBINED N/A 1/25/2019    Procedure: Local Trans anal excision Of recurrent Anal Melanoma;  Surgeon: Ronald Wright MD;  [facility]     EXCISE LESION RECTUM N/A 4/13/2018    Procedure: EXCISE LESION RECTUM;;  Surgeon: Ronald Wright MD;  [facility]     PROCTOSCOPY N/A 1/25/2019    Procedure: Proctoscopy;  Surgeon: Ronald Wright MD;  [facility]       SOCIAL HISTORY  She has worked as a caregiver, 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 Lifelong nonsmoker. No significant alcohol. She has twins.    FAMILY HISTORY  Father had a skin cancer, unsure if melanoma. Paternal aunt had stomach cancer.    PHYSICAL EXAMINATION  General: The patient is a pleasant female in no acute distress.  /59 (BP Location: Left arm, Patient Position: Chair, Cuff Size: Adult Regular)   Pulse 110   Temp 99  F (37.2  C) (Oral)   Resp 18   Ht 1.702 m (5' 7.01\")   Wt 85.1 kg (187 lb 9.6 oz)   SpO2 98%   BMI 29.38 kg/m     Wt Readings from Last 10 Encounters:   02/27/19 85.1 kg (187 lb 9.6 oz)   01/25/19 86 kg (189 lb 9.5 oz)   01/15/19 89.8 kg (198 lb)   01/08/19 89.9 kg (198 lb 4.8 oz)   01/08/19 90.2 kg (198 lb 14.4 oz)   12/13/18 93.9 kg (207 lb)   11/15/18 93.9 kg (207 lb)   10/18/18 93.2 kg (205 lb 6.4 oz)   09/20/18 92.9 kg (204 lb 11.2 oz)   08/23/18 93.3 kg (205 lb 11.2 oz)   HEENT: EOMI, PERRL. Sclerae are anicteric. Oral mucosa is pink and moist with no lesions or thrush.   Lymph: Neck is supple with no lymphadenopathy in the cervical or supraclavicular areas.   Heart: Regular rate and rhythm.   Lungs: Clear to auscultation bilaterally.   Abdomen: Bowel sounds present, soft, nontender with no palpable hepatosplenomegaly or masses.   Extremities: 2+ lower extremity edema noted bilaterally.   Neuro: Cranial nerves II through XII are grossly intact.  Skin: No rashes, petechiae, or bruising " noted on exposed skin.    LABS   2/27/2019 12:17   Sodium 132 (L)   Potassium 3.7   Chloride 102   Carbon Dioxide 23   Urea Nitrogen 16   Creatinine 0.78   GFR Estimate 82   GFR Estimate If Black >90   Calcium 7.9 (L)   Anion Gap 8   Albumin 2.6 (L)   Protein Total 6.2 (L)   Bilirubin Total 0.3   Alkaline Phosphatase 81   ALT 54 (H)   AST 35   Cortisol Serum 16.0   Glucose 122 (H)   WBC 4.2   Hemoglobin 12.4   Hematocrit 36.4   Platelet Count 219   RBC Count 4.31   MCV 85   MCH 28.8   MCHC 34.1   RDW 14.0   Diff Method Automated Method   % Neutrophils 59.6   % Lymphocytes 27.6   % Monocytes 7.2   % Eosinophils 4.6   % Basophils 0.5   % Immature Granulocytes 0.5   Nucleated RBCs 0   Absolute Neutrophil 2.5   Absolute Lymphocytes 1.2   Absolute Monocytes 0.3   Absolute Eosinophils 0.2   Absolute Basophils 0.0   Abs Immature Granulocytes 0.0   Absolute Nucleated RBC 0.0      2/27/2019 13:50   Color Urine Yellow   Appearance Urine Cloudy   Glucose Urine Negative   Bilirubin Urine Negative   Ketones Urine Negative   Specific Gravity Urine 1.020   pH Urine 5.0   Protein Albumin Urine Negative   Urobilinogen mg/dL 0.0   Nitrite Urine Negative   Blood Urine Negative   Leukocyte Esterase Urine Small (A)   Source Midstream Urine   WBC Urine 9 (H)   RBC Urine 2   Bacteria Urine Few (A)   Squamous Epithelial /HPF Urine 10 (H)   Transitional Epi 1 (A)   Mucous Urine Present (A)   Calcium Oxalate Many (A)       ASSESSMENT AND PLAN  #1 Recurrent metastatic melanoma to bowel,  lymph nodes, gall bladder, and lung  #2 Resected Stage IIIC nodular melanoma, pT4b pN2b, perianal primary  It was a pleasure to see Ms. Tamez today. She is a 63 year old woman with metastatic anal melanoma. She was on adjuvant nivolumab and she has shown definitive evidence of recurrence on nivolumab. PET-CT suggests metastasis to several additional sites.    Therefore, she will start on treatment with ipilimumab and nivolumab today. We reviewed the  potential for increased response rates with the combination therapy, but also the increased risk of immune mediated side effects. We reviewed the importance of calling if she develops profound fatigue (worse than her baseline), cough, dyspnea, chest pain, abdominal pain or cramping, diarrhea, or decline in urine output. We reviewed common side effects of fatigue and itchy skin. She will see Dr. Valentine back on 3/21 prior to cycle 2. She will call sooner for concerns.      #3 Deconditioning  #4 Malnutrition  We will need to work to improve her strength and her nutrition for consideration of clinical trials down the line if Ipi/Nivo were to be ineffective. She may benefit from physical rehab and nutrition consultation. She is not interested at this time. Will review with her again at her next visit.     #5 Hypothyroidism  TSH is currently 1.63. Maintain current dose of 125 mcg daily.    #6 Verbal and memory impairments  #7 Myoclonic jerking  It is not clear what the cause of her neurologic worsening is at present. Symptoms are mildly improved after treating her UTI. UA today is not consistent with UTI. As symptoms are not completely resolved, will refer to neurology.     #8 Leg swelling.  Recommend leg elevation, compression stockings, and increasing protein in her diet.     Meka Payne PA-C  [facility]  [address] [place] [address] [phone]

## 2019-02-27 NOTE — NURSING NOTE
"Oncology Rooming Note    February 27, 2019 12:28 PM   Khloe Tamez is a 63 year old female who presents for:    Chief Complaint   Patient presents with     Blood Draw     labs drawn via PIV placed by RN     Oncology Clinic Visit     Return visit related to Melanoma     Initial Vitals: /59 (BP Location: Left arm, Patient Position: Chair, Cuff Size: Adult Regular)   Pulse 110   Temp 99  F (37.2  C) (Oral)   Resp 18   Ht 1.702 m (5' 7.01\")   Wt 85.1 kg (187 lb 9.6 oz)   SpO2 98%   BMI 29.38 kg/m   Estimated body mass index is 29.38 kg/m  as calculated from the following:    Height as of this encounter: 1.702 m (5' 7.01\").    Weight as of this encounter: 85.1 kg (187 lb 9.6 oz). Body surface area is 2.01 meters squared.  No Pain (0) Comment: reports dizziness   No LMP recorded. Patient is postmenopausal.  Allergies reviewed: Yes  Medications reviewed: Yes    Medications: Medication refills not needed today.  Pharmacy name entered into [employer]:    [facility] [address] [facility] [address] [facility] [address]  [facility] [address] [facility] [address] [place] [address] [address]    Clinical concerns: No new concerns. Provider was notified.      Rachel Henry LPN            "

## 2019-02-27 NOTE — PROGRESS NOTES
[facility]  MEDICAL ONCOLOGY PROGRESS NOTE  Feb 27, 2019    CHIEF COMPLAINT: Perianal melanoma    Melanoma History:  1. 3/27/18 patient had perianal lesion biopsied in [place] ([id number]). This showed malignant melanoma, positive for HMB45 and S100, and negative for CD45 and pancytokeratin. Breslow depth at least 3.5 mm, non-ulcerated, with 5 mitoses/mm2, TILs present with deep and peripheral margins positive; pT3a.  2. 3/29/18, PET-CT showed a perianal FDG avid lesion (SUV max 10.8) and a superficial left inguinal lymph node measuring 0.9 cm (SUV max 4.1).  3. 4/13/18, she has wide local excision of a 2 x 2.5 cm deep perianal melanoma with anaplastic closure, under Dr. Wright. Pathology showed a nodular melanoma, Breslow depth 6.0 mm to mark's level IV, there were more than 10 mitoses per mm2, with ulceration, and lymphovascular invasion present. No perineural invasion or microsatellitosis. Tumor infiltrating lymphocytes present, non-brisk. AJCC microstage: pT4b.  4. 4/26/18, she has MRI brain, which is negative for metastatic disease.  5. 5/2/18, she returns to OR under Dr. Shah for left femoral sentinel lymph node biopsy, with 2 (of 3) involved lymph nodes. IHC positive for Melan-A, tyrosinase, and HMB-45. No definite extranodal extension.  6  5/31/18, she begins treatment with adjuvant nivolumab  7. 6/11/18, [facility] testing shows PD-L1 0% by IHC, with microsatellite stability (MARY LOU) and tumor mutational burden low at 4 per Mb DNA. Mutations seen in STK11 V4fs*160, BRAF D594G, SF3B1 R625H and KIT amplification.  8. 7/2/18, colonosocopy with diverticulosis and one sessile serrated adenoma and two tubular adenomas.  9. 11/15/18, CT-CAP showed no evidence of metastatic disease.  10. 12/19/18, she has bloody loose stool and seen in ER with CT-scan showing a small region of circumferential wall thickening around the rectosigmoid junction.  11. 1/8/19, she sees Dr. Wright. He performed  anoscopy, which showed a 2.5 cm lesion in the left posterior postiion with central depression. Biopsies taken (Specimen #: ) and pathology returns recurrent melanoma.  12. 1/15/19, MRI pelvis obtained, which showed lower rectal 4 cm enhancing lobulated mass, also involving the anal verge, consistent with biopsy-proven anal melanoma.  It abuts the  internal anal sphincter. There are also scattered adjacent perirectal lymph nodes some of them with suspicious morphology.  13. 1/24/19, she has PET-CT scan, which showed the 4 cm rectal mass, an FDG avid right inguinal lymph node, and a nodular focus of FDG uptake within the galllbadder. There is also a 5 mm solid nodule in the right lower lobe, which has increased from 3 mm on 1/24/19. Remainder of sub 1-cm nodules stable in appearance.  14. 1/25/19, she has transanal excision of recurrent anal melanoma, under Dr. Wright. Surgical pathology (Specimen #: [id number]) showed an ulcerated melanoma lesion to depth 7 mm without evidence of lymphovascular invasion. The lesion appeared completely resected.    HISTORY OF PRESENT ILLNESS  Khloe Tamez is a 63 year old female from [place], [place] with resected perianal melanoma. She presents today for re-evaluation following recent transanal excision of recurrent melanoma in the rectum, under Dr. Wright.  Patient reports that she feels lightheaded with position changes and when her eyes see rapid movements.  She finds the lightheadedness limits her ability to walk.  This is gotten worse over the last 3 weeks.  Her  also notices that she twitches while sleeping.  She did complete antibiotics a few days ago for a UTI.  She reports that her shakiness was worse on the Bactrim.  She also felt like her skin and mouth were burning and she had some red bumps on her skin.  Her  notes that her confusion does seem to be better since completing the course of antibiotics.  She reports eating okay.  She is drinking at  least 3 bottles of 16 ounce water per day.  She has noticed some increase in the swelling in both of her feet and legs over the last few days.  She is trying to use some compression stockings and elevating her legs.  She denies any pain with this.  She denies any chest pain, dyspnea, or orthopnea.  She reports that her skin and lips felt itchy last week but this has since improved.  She notes that she easily gets constipated and is trying to manage this with fiber in her diet as well as Metamucil.  She denies other concerns.    ECOG performance status is 2.    Current Outpatient Medications   Medication     acetaminophen (TYLENOL ARTHRITIS PAIN) 650 MG CR tablet     levothyroxine (SYNTHROID/LEVOTHROID) 125 MCG tablet     VITAMIN D, CHOLECALCIFEROL, PO     benzonatate (TESSALON) 100 MG capsule     calcium carbonate (OS-JANAY 500 MG Ken[place]. CA) 1250 MG tablet     DIPHENHYDRAMINE HCL PO     guaiFENesin (MUCINEX) 600 MG 12 hr tablet     LORazepam (ATIVAN) 0.5 MG tablet     LORazepam (ATIVAN) 0.5 MG tablet     MAGNESIUM PO     polyethylene glycol (MIRALAX/GLYCOLAX) packet     prochlorperazine (COMPAZINE) 10 MG tablet     prochlorperazine (COMPAZINE) 10 MG tablet     Psyllium (METAMUCIL PO)     No current facility-administered medications for this visit.        Past Medical History:   Diagnosis Date     Melanoma malignant, perianal skin (H)      Motion sickness      Obesity (BMI 30.0-34.9)      Osteoarthritis      PONV (postoperative nausea and vomiting)      Past Surgical History:   Procedure Laterality Date     BIOPSY NODE SENTINEL Left 2018    Procedure: BIOPSY NODE SENTINEL;  [place] Lymph Node Biopsy x3, Lymphatic mapping;  Surgeon: Adams Shah MD;  Location:  OR      SECTION       COLONOSCOPY       EXAM UNDER ANESTHESIA ANUS N/A 2018    Procedure: EXAM UNDER ANESTHESIA ANUS;  Examination Under Anesthesia Anus, Local Excision of Perianal Melanoma;  Surgeon: Ronald Wright MD;  Location:  OR      "EXAM UNDER ANESTHESIA, EXCISE LESION RECTUM, COMBINED N/A 1/25/2019    Procedure: Local Trans anal excision Of recurrent Anal Melanoma;  Surgeon: Ronald Wright MD;  Location: [facility] OR     EXCISE LESION RECTUM N/A 4/13/2018    Procedure: EXCISE LESION RECTUM;;  Surgeon: Ronald Wright MD;  Location: [facility] OR     PROCTOSCOPY N/A 1/25/2019    Procedure: Proctoscopy;  Surgeon: Ronald Wright MD;  Location: [facility] OR       SOCIAL HISTORY  She has worked as a caregiver, 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 Lifelong nonsmoker. No significant alcohol. She has twins.    FAMILY HISTORY  Father had a skin cancer, unsure if melanoma. Paternal aunt had stomach cancer.    PHYSICAL EXAMINATION  General: The patient is a pleasant female in no acute distress.  /59 (BP Location: Left arm, Patient Position: Chair, Cuff Size: Adult Regular)   Pulse 110   Temp 99  F (37.2  C) (Oral)   Resp 18   Ht 1.702 m (5' 7.01\")   Wt 85.1 kg (187 lb 9.6 oz)   SpO2 98%   BMI 29.38 kg/m    Wt Readings from Last 10 Encounters:   02/27/19 85.1 kg (187 lb 9.6 oz)   01/25/19 86 kg (189 lb 9.5 oz)   01/15/19 89.8 kg (198 lb)   01/08/19 89.9 kg (198 lb 4.8 oz)   01/08/19 90.2 kg (198 lb 14.4 oz)   12/13/18 93.9 kg (207 lb)   11/15/18 93.9 kg (207 lb)   10/18/18 93.2 kg (205 lb 6.4 oz)   09/20/18 92.9 kg (204 lb 11.2 oz)   08/23/18 93.3 kg (205 lb 11.2 oz)   HEENT: EOMI, PERRL. Sclerae are anicteric. Oral mucosa is pink and moist with no lesions or thrush.   Lymph: Neck is supple with no lymphadenopathy in the cervical or supraclavicular areas.   Heart: Regular rate and rhythm.   Lungs: Clear to auscultation bilaterally.   Abdomen: Bowel sounds present, soft, nontender with no palpable hepatosplenomegaly or masses.   Extremities: 2+ lower extremity edema noted bilaterally.   Neuro: Cranial nerves II through XII are grossly intact.  Skin: No rashes, petechiae, or bruising noted on exposed skin.    LABS   2/27/2019 12:17   Sodium 132 (L) "   Potassium 3.7   Chloride 102   Carbon Dioxide 23   Urea Nitrogen 16   Creatinine 0.78   GFR Estimate 82   GFR Estimate If Black >90   Calcium 7.9 (L)   Anion Gap 8   Albumin 2.6 (L)   Protein Total 6.2 (L)   Bilirubin Total 0.3   Alkaline Phosphatase 81   ALT 54 (H)   AST 35   Cortisol Serum 16.0   Glucose 122 (H)   WBC 4.2   Hemoglobin 12.4   Hematocrit 36.4   Platelet Count 219   RBC Count 4.31   MCV 85   MCH 28.8   MCHC 34.1   RDW 14.0   Diff Method Automated Method   % Neutrophils 59.6   % Lymphocytes 27.6   % Monocytes 7.2   % Eosinophils 4.6   % Basophils 0.5   % Immature Granulocytes 0.5   Nucleated RBCs 0   Absolute Neutrophil 2.5   Absolute Lymphocytes 1.2   Absolute Monocytes 0.3   Absolute Eosinophils 0.2   Absolute Basophils 0.0   Abs Immature Granulocytes 0.0   Absolute Nucleated RBC 0.0      2/27/2019 13:50   Color Urine Yellow   Appearance Urine Cloudy   Glucose Urine Negative   Bilirubin Urine Negative   Ketones Urine Negative   Specific Gravity Urine 1.020   pH Urine 5.0   Protein Albumin Urine Negative   Urobilinogen mg/dL 0.0   Nitrite Urine Negative   Blood Urine Negative   Leukocyte Esterase Urine Small (A)   Source Midstream Urine   WBC Urine 9 (H)   RBC Urine 2   Bacteria Urine Few (A)   Squamous Epithelial /HPF Urine 10 (H)   Transitional Epi 1 (A)   Mucous Urine Present (A)   Calcium Oxalate Many (A)       ASSESSMENT AND PLAN  #1 Recurrent metastatic melanoma to bowel,  lymph nodes, gall bladder, and lung  #2 Resected Stage IIIC nodular melanoma, pT4b pN2b, perianal primary  It was a pleasure to see Ms. Tamez today. She is a 63 year old woman with metastatic anal melanoma. She was on adjuvant nivolumab and she has shown definitive evidence of recurrence on nivolumab. PET-CT suggests metastasis to several additional sites.    Therefore, she will start on treatment with ipilimumab and nivolumab today. We reviewed the potential for increased response rates with the combination therapy, but  also the increased risk of immune mediated side effects. We reviewed the importance of calling if she develops profound fatigue (worse than her baseline), cough, dyspnea, chest pain, abdominal pain or cramping, diarrhea, or decline in urine output. We reviewed common side effects of fatigue and itchy skin. She will see Dr. Valentine back on 3/21 prior to cycle 2. She will call sooner for concerns.      #3 Deconditioning  #4 Malnutrition  We will need to work to improve her strength and her nutrition for consideration of clinical trials down the line if Ipi/Nivo were to be ineffective. She may benefit from physical rehab and nutrition consultation. She is not interested at this time. Will review with her again at her next visit.     #5 Hypothyroidism  TSH is currently 1.63. Maintain current dose of 125 mcg daily.    #6 Verbal and memory impairments  #7 Myoclonic jerking  It is not clear what the cause of her neurologic worsening is at present. Symptoms are mildly improved after treating her UTI. UA today is not consistent with UTI. As symptoms are not completely resolved, will refer to neurology.     #8 Leg swelling.  Recommend leg elevation, compression stockings, and increasing protein in her diet.     Meka Payne PA-C  [facility]  [address] [place] [address] [phone]

## 2019-02-27 NOTE — PATIENT INSTRUCTIONS
These appointments are being made for you:    3/21 [place]/labs/ipi/nivo  Neurology referral    Contact Numbers    [facility] Main Line: [phone]  [facility] Triage and after hours / weekends / holidays:  [phone]      Please call the triage or after hours line if you experience a temperature greater than or equal to 100.5, shaking chills, have uncontrolled nausea, vomiting and/or diarrhea, dizziness, shortness of breath, chest pain, bleeding, unexplained bruising, or if you have any other new/concerning symptoms, questions or concerns.      If you are having any concerning symptoms or wish to speak to a provider before your next infusion visit, please call your care coordinator or triage to notify them so we can adequately serve you.     If you need a refill on a narcotic prescription or other medication, please call before your infusion appointment.            Recent Results (from the past 24 hour(s))   CBC with platelets differential    Collection Time: 02/27/19 12:17 PM   Result Value Ref Range    WBC 4.2 4.0 - 11.0 10e9/L    RBC Count 4.31 3.8 - 5.2 10e12/L    Hemoglobin 12.4 11.7 - 15.7 g/dL    Hematocrit 36.4 35.0 - 47.0 %    MCV 85 78 - 100 fl    MCH 28.8 26.5 - 33.0 pg    MCHC 34.1 31.5 - 36.5 g/dL    RDW 14.0 10.0 - 15.0 %    Platelet Count 219 150 - 450 10e9/L    Diff Method Automated Method     % Neutrophils 59.6 %    % Lymphocytes 27.6 %    % Monocytes 7.2 %    % Eosinophils 4.6 %    % Basophils 0.5 %    % Immature Granulocytes 0.5 %    Nucleated RBCs 0 0 /100    Absolute Neutrophil 2.5 1.6 - 8.3 10e9/L    Absolute Lymphocytes 1.2 0.8 - 5.3 10e9/L    Absolute Monocytes 0.3 0.0 - 1.3 10e9/L    Absolute Eosinophils 0.2 0.0 - 0.7 10e9/L    Absolute Basophils 0.0 0.0 - 0.2 10e9/L    Abs Immature Granulocytes 0.0 0 - 0.4 10e9/L    Absolute Nucleated RBC 0.0    Comprehensive metabolic panel    Collection Time: 02/27/19 12:17 PM   Result Value Ref Range    Sodium 132 (L) 133 - 144 mmol/L    Potassium 3.7 3.4 - 5.3  mmol/L    Chloride 102 94 - 109 mmol/L    Carbon Dioxide 23 20 - 32 mmol/L    Anion Gap 8 3 - 14 mmol/L    Glucose 122 (H) 70 - 99 mg/dL    Urea Nitrogen 16 7 - 30 mg/dL    Creatinine 0.78 0.52 - 1.04 mg/dL    GFR Estimate 82 >60 mL/min/[1.73_m2]    GFR Estimate If Black >90 >60 mL/min/[1.73_m2]    Calcium 7.9 (L) 8.5 - 10.1 mg/dL    Bilirubin Total 0.3 0.2 - 1.3 mg/dL    Albumin 2.6 (L) 3.4 - 5.0 g/dL    Protein Total 6.2 (L) 6.8 - 8.8 g/dL    Alkaline Phosphatase 81 40 - 150 U/L    ALT 54 (H) 0 - 50 U/L    AST 35 0 - 45 U/L   Cortisol    Collection Time: 02/27/19 12:17 PM   Result Value Ref Range    Cortisol Serum 16.0 4 - 22 ug/dL   Routine UA with micro reflex to culture    Collection Time: 02/27/19  1:50 PM   Result Value Ref Range    Color Urine Yellow     Appearance Urine Cloudy     Glucose Urine Negative NEG^Negative mg/dL    Bilirubin Urine Negative NEG^Negative    Ketones Urine Negative NEG^Negative mg/dL    Specific Gravity Urine 1.020 1.003 - 1.035    Blood Urine Negative NEG^Negative    pH Urine 5.0 5.0 - 7.0 pH    Protein Albumin Urine Negative NEG^Negative mg/dL    Urobilinogen mg/dL 0.0 0.0 - 2.0 mg/dL    Nitrite Urine Negative NEG^Negative    Leukocyte Esterase Urine Small (A) NEG^Negative    Source Midstream Urine     WBC Urine 9 (H) 0 - 5 /HPF    RBC Urine 2 0 - 2 /HPF    Bacteria Urine Few (A) NEG^Negative /HPF    Squamous Epithelial /HPF Urine 10 (H) 0 - 1 /HPF    Transitional Epi 1 (A) FEW^Few /HPF    Mucous Urine Present (A) NEG^Negative /LPF    Calcium Oxalate Many (A) NEG^Negative /HPF     Imodium (Loperamide)   Take 4 mg (2 capsules) Imodium with first loose stool. Take 2 mg (1 capsule) every 2-4 hours with loose/watery stools. If diarrhea persists after 24 hours, call triage.    Coping with Diarrhea  What is diarrhea?  If you have loose, watery bowel movements at least three times a day, you have diarrhea. You may also have gas and stomach cramps.  Emotional upset, infection and bad  reactions to food may make diarrhea worse.  How is it treated?  Severe diarrhea can be treated with medicine, such as Imodium and Lomotil. These slow the digestive tract and reduce the amount of fluid lost in bowel movements. Your care team can tell you if medicine is right for you.  What else can I do to treat or prevent diarrhea?    Avoid:  ? Fried, fatty, greasy, spicy or very sweet foods  ? Caffeine and alcohol  ? High-fiber foods such as whole grains, raw vegetables, unpeeled fresh fruits, dried fruits, dried beans and peas, nuts, seeds and popcorn  ? Chewing gum and sugar-free candies (these often contain a sugar alcohol that may increase diarrhea)  ? Gas-forming foods such as broccoli, cauliflower, brussels sprouts and carbonated (fizzy) drinks.    Eat smaller amounts of food, but eat more often. Serve foods cold or at room temperature.    Drink six to eight 8-ounce glasses of fluid each day, such as:  ? Fruit juice, watered-down (half water)  ? Broth or gelatin  ? Popsicles  ? Sports drinks or flat decaf soda pop (leave it open for at least 10 minutes before drinking).    Limit milk products to two low-fat servings daily.    Increase your potassium with watered-down orange juice, sports drinks, potatoes without skin, bananas or tomato products.    Increase your sodium with soups and broths, sports drinks, crackers and pretzels.    Rest and avoid stress.    Weigh yourself daily. Keep a record of your weight and how often you pass loose stools.    Take warm baths to keep yourself clean. Tell your doctor if your rectum is red, painful or swollen.    Think about buying a skin barrier at the drug store. This can help protect the skin around your rectum from irritation and skin breakdown. Use it after each bowel movement.    If diarrhea is severe, have only clear liquids (liquids that you can see through) until it stops. Once it stops, slowly return to your normal diet.  When should I call my care team?  Call your  care team if:    You follow the steps listed here, but your diarrhea does not improve within 24 hours.    You have more than six loose stools in one day.    You have sudden, severe belly pain.    You have been unable to eat for over two days.    You have fever or dizziness along with diarrhea.    You notice blood in your stool.  Food group Recommended foods Foods to avoid   Meats, eggs and cheese Broiled or baked lean meat, fish, chicken or turkey (no skin). Eggs, well-cooked. Beans. Chicken or turkey with the skin.   Breads and starches Breads, rolls and pastas made from white flour. Instant white rice, refined cereals (Cream of Wheat, Cream of Rice, Cornflakes, Rice Krispies), pancakes, waffles, muffins, cornbread, timoteo crackers. Whole grain breads and cereals, bran, Shredded Wheat, wild rice, granola.   Fruits and vegetables Canned, frozen or peeled fresh fruits such as bananas or applesauce. Tomato paste, tomato sauce, tomato puree, cooked vegetables (acorn squash, asparagus, beets, carrots, celery, green beans, mushrooms, baked potato without skin, peeled zucchini). Unpeeled fruits, melons, grapefruit juice, all vegetables not listed on the left.   Milk products Skim or 1% milk, low-fat yogurt, low-fat cheese. Whole or 2% milk, high-fat ice cream, cream.   Drinks Sports drinks, watered-down fruit juices. (No caffeine.) Prune juice, caffeine.   Desserts Cookies, cake, gelatin, sherbet, fruit pies (avoid pies made with unpeeled fruit). Nuts, coconut, chocolate, licorice.   Other Broth, butter, margarine, mayonnaise, salad dressing, vegetable oil. Hot sauce, pepper, chili powder, taco seasoning.     For informational purposes only. Not to replace the advice of your health care provider.  Copyright   2006 [facility]. All rights reserved. [employer] [id number] - REV 12/15.

## 2019-02-27 NOTE — Clinical Note
2/27/2019       RE: Khloe Tamez  [address] [facility] [address]     Dear Colleague,    Thank you for referring your patient, Khloe Tamez, to the [facility]. Please see a copy of my visit note below.    [facility]  MEDICAL ONCOLOGY PROGRESS NOTE  Feb 27, 2019    CHIEF COMPLAINT: Perianal melanoma    Melanoma History:  1. 3/27/18 patient had perianal lesion biopsied in [place] ([id number]). This showed malignant melanoma, positive for HMB45 and S100, and negative for CD45 and pancytokeratin. Breslow depth at least 3.5 mm, non-ulcerated, with 5 mitoses/mm2, TILs present with deep and peripheral margins positive; pT3a.  2. 3/29/18, PET-CT showed a perianal FDG avid lesion (SUV max 10.8) and a superficial left inguinal lymph node measuring 0.9 cm (SUV max 4.1).  3. 4/13/18, she has wide local excision of a 2 x 2.5 cm deep perianal melanoma with anaplastic closure, under Dr. Wright. Pathology showed a nodular melanoma, Breslow depth 6.0 mm to mark's level IV, there were more than 10 mitoses per mm2, with ulceration, and lymphovascular invasion present. No perineural invasion or microsatellitosis. Tumor infiltrating lymphocytes present, non-brisk. AJCC microstage: pT4b.  4. 4/26/18, she has MRI brain, which is negative for metastatic disease.  5. 5/2/18, she returns to OR under Dr. Shah for left femoral sentinel lymph node biopsy, with 2 (of 3) involved lymph nodes. IHC positive for Melan-A, tyrosinase, and HMB-45. No definite extranodal extension.  6  5/31/18, she begins treatment with adjuvant nivolumab  7. 6/11/18, [employer] testing shows PD-L1 0% by IHC, with microsatellite stability (MARY LOU) and tumor mutational burden low at 4 per Mb DNA. Mutations seen in STK11 V4fs*160, BRAF D594G, SF3B1 R625H and KIT amplification.  8. 7/2/18, colonosocopy with diverticulosis and one sessile serrated adenoma and two tubular adenomas.  9. 11/15/18, CT-CAP showed no evidence  of metastatic disease.  10. 12/19/18, she has bloody loose stool and seen in ER with CT-scan showing a small region of circumferential wall thickening around the rectosigmoid junction.  11. 1/8/19, she sees Dr. Wright. He performed anoscopy, which showed a 2.5 cm lesion in the left posterior postiion with central depression. Biopsies taken (Specimen #: ) and pathology returns recurrent melanoma.  12. 1/15/19, MRI pelvis obtained, which showed lower rectal 4 cm enhancing lobulated mass, also involving the anal verge, consistent with biopsy-proven anal melanoma.  It abuts the  internal anal sphincter. There are also scattered adjacent perirectal lymph nodes some of them with suspicious morphology.  13. 1/24/19, she has PET-CT scan, which showed the 4 cm rectal mass, an FDG avid right inguinal lymph node, and a nodular focus of FDG uptake within the galllbadder. There is also a 5 mm solid nodule in the right lower lobe, which has increased from 3 mm on 1/24/19. Remainder of sub 1-cm nodules stable in appearance.  14. 1/25/19, she has transanal excision of recurrent anal melanoma, under Dr. Wright. Surgical pathology (Specimen #: [id number]) showed an ulcerated melanoma lesion to depth 7 mm without evidence of lymphovascular invasion. The lesion appeared completely resected.    HISTORY OF PRESENT ILLNESS  Khloe Tamez is a 63 year old female from [place], [place] with resected perianal melanoma. She presents today for re-evaluation following recent transanal excision of recurrent melanoma in the rectum, under Dr. Wright.  Patient reports that she feels lightheaded with position changes and when her eyes see rapid movements.  She finds the lightheadedness limits her ability to walk.  This is gotten worse over the last 3 weeks.  Her  also notices that she twitches while sleeping.  She did complete antibiotics a few days ago for a UTI.  She reports that her shakiness was worse on the Bactrim.  She also  felt like her skin and mouth were burning and she had some red bumps on her skin.  Her  notes that her confusion does seem to be better since completing the course of antibiotics.  She reports eating okay.  She is drinking at least 3 bottles of 16 ounce water per day.  She has noticed some increase in the swelling in both of her feet and legs over the last few days.  She is trying to use some compression stockings and elevating her legs.  She denies any pain with this.  She denies any chest pain, dyspnea, or orthopnea.  She reports that her skin and lips felt itchy last week but this has since improved.  She notes that she easily gets constipated and is trying to manage this with fiber in her diet as well as Metamucil.  She denies other concerns.      ECOG performance status is 2.      REVIEW OF SYSTEMS  A 12-point ROS negative except as in HPI.    Current Outpatient Medications   Medication     acetaminophen (TYLENOL ARTHRITIS PAIN) 650 MG CR tablet     levothyroxine (SYNTHROID/LEVOTHROID) 125 MCG tablet     VITAMIN D, CHOLECALCIFEROL, PO     benzonatate (TESSALON) 100 MG capsule     calcium carbonate (OS-JANAY 500 MG [place]) 1250 MG tablet     DIPHENHYDRAMINE HCL PO     guaiFENesin (MUCINEX) 600 MG 12 hr tablet     LORazepam (ATIVAN) 0.5 MG tablet     LORazepam (ATIVAN) 0.5 MG tablet     MAGNESIUM PO     polyethylene glycol (MIRALAX/GLYCOLAX) packet     prochlorperazine (COMPAZINE) 10 MG tablet     prochlorperazine (COMPAZINE) 10 MG tablet     Psyllium (METAMUCIL PO)     No current facility-administered medications for this visit.        Past Medical History:   Diagnosis Date     Melanoma malignant, perianal skin (H)      Motion sickness      Obesity (BMI 30.0-34.9)      Osteoarthritis      PONV (postoperative nausea and vomiting)      Past Surgical History:   Procedure Laterality Date     BIOPSY NODE SENTINEL Left 5/2/2018    Procedure: BIOPSY NODE SENTINEL;  [place] Lymph Node Biopsy x3, Lymphatic  "mapping;  Surgeon: Adams Shah MD;  Location: [facility] OR      SECTION       COLONOSCOPY       EXAM UNDER ANESTHESIA ANUS N/A 2018    Procedure: EXAM UNDER ANESTHESIA ANUS;  Examination Under Anesthesia Anus, Local Excision of Perianal Melanoma;  Surgeon: Ronald Wright MD;  Location: [facility] OR     EXAM UNDER ANESTHESIA, EXCISE LESION RECTUM, COMBINED N/A 2019    Procedure: Local Trans anal excision Of recurrent Anal Melanoma;  Surgeon: Ronald Wright MD;  Location: [facility] OR     EXCISE LESION RECTUM N/A 2018    Procedure: EXCISE LESION RECTUM;;  Surgeon: Ronald Wright MD;  Location: [facility] OR     PROCTOSCOPY N/A 2019    Procedure: Proctoscopy;  Surgeon: Ronald Wright MD;  Location: [facility] OR       SOCIAL HISTORY  She has worked as a caregiver, currently works at assisted living facility. Lifelong nonsmoker. No significant alcohol. She has twins.      FAMILY HISTORY  Father had a skin cancer, unsure if melanoma. Paternal aunt had stomach cancer.    PHYSICAL EXAMINATION  General: The patient is a pleasant female in no acute distress.  /59 (BP Location: Left arm, Patient Position: Chair, Cuff Size: Adult Regular)   Pulse 110   Temp 99  F (37.2  C) (Oral)   Resp 18   Ht 1.702 m (5' 7.01\")   Wt 85.1 kg (187 lb 9.6 oz)   SpO2 98%   BMI 29.38 kg/m     Wt Readings from Last 10 Encounters:   19 85.1 kg (187 lb 9.6 oz)   19 86 kg (189 lb 9.5 oz)   01/15/19 89.8 kg (198 lb)   19 89.9 kg (198 lb 4.8 oz)   19 90.2 kg (198 lb 14.4 oz)   18 93.9 kg (207 lb)   11/15/18 93.9 kg (207 lb)   10/18/18 93.2 kg (205 lb 6.4 oz)   18 92.9 kg (204 lb 11.2 oz)   18 93.3 kg (205 lb 11.2 oz)   HEENT: EOMI, PERRL. Sclerae are anicteric. Oral mucosa is pink and moist with no lesions or thrush.   Lymph: Neck is supple with no lymphadenopathy in the cervical or supraclavicular areas.   Heart: Regular rate and rhythm.   Lungs: Clear to auscultation bilaterally.   Abdomen: Bowel " sounds present, soft, nontender with no palpable hepatosplenomegaly or masses.   Extremities: No lower extremity edema noted bilaterally.   Neuro: Cranial nerves II through XII are grossly intact.  Skin: No rashes, petechiae, or bruising noted on exposed skin.    LABS      ASSESSMENT AND PLAN  #1 Recurrent metastatic melanoma to bowel,  lymph nodes, gall bladder, and lung  #2 Resected Stage IIIC nodular melanoma, pT4b pN2b, perianal primary  It was a pleasure to see Ms. Tamez today. She is a 63 year old woman with metastatic anal melanoma. She was on adjuvant nivolumab and she has shown definitive evidence of recurrence on nivolumab. PET-CT suggests metastasis to several additional sites.    We reviewed that her melanoma is PD-L1 negative, making it less likely that she would respond to PD1 inhibitor monotherapy for metastatic disease. I reviewed the option for standard therapy at this time would be dual checkpoint inhibition with ipilimumab and nivolumab. This is associated with increases in response rates and overall survival, however, responses are generally somewhat lower for mucosal and anal melanoma primary tumors. I would anticipate a response rate of 40% with dual checkpoint inhibition.    She agrees to proceed. We will arrange and get scheduled. Will see her back next week to begin.    #3 Deconditioning  #4 Malnutrition  We will need to work to improve her strength and her nutrition for consideration of clinical trials down the line if Ipi/Nivo were to be ineffective. She may benefit from physical rehab and nutrition consultation. Will discuss on her return visit next week.    #5 Hypothyroidism  TSH is currently 1.63. Maintain current dose of 125 mcg daily.    #6 Verbal and memory impairments  #7 Myoclonic jerking  It is not clear what the cause of her neurologic worsening is at present. I will see her back in 1 week if there is no improvement will refer to neurology for work-up.    #8 Urinary tract  infection, presumed cystitis  Bactrim was sent earlier today by surgery.     Meka Payne PA-C  [facility] [address] [id number] [phone]      Again, thank you for allowing me to participate in the care of your patient.      Sincerely,    Meka Payne PA-C

## 2019-02-27 NOTE — NURSING NOTE
Chief Complaint   Patient presents with     Blood Draw     labs drawn via PIV placed by RN     /59 (BP Location: Left arm, Patient Position: Chair, Cuff Size: Adult Regular)   Pulse 110   Temp 99  F (37.2  C) (Oral)   Wt 85.1 kg (187 lb 9.6 oz)   SpO2 98%   BMI 29.38 kg/m      PIV placed right lower forearm in lab for infusion and labs. Labs drawn and sent. Pt tolerated well.   Pt checked in for next appointment.    Racheal Lambert RN

## 2019-02-27 NOTE — PROGRESS NOTES
Infusion Nursing Note:  Khloe Tamez presents today for Cycle 1 Day 1 Nivolumab, Ipilimumab.   Patient seen by provider today: Yes: HUMBLE Solares    Note: Patient presents to infusion with her  Memo. Patient has previously received Nivolumab and that they have received a written handout for Ipilimumab. Medication reviewed with patient including side effects. Patient states she has imodium at home. Educated patient to start taking imodium with first loose stool, and to call triage for diarrhea that lasts longer than 24 hours. Patient educated to drink 64 oz non-caffeinated, non-alcoholic fluid daily, call triage for temp > 100.4, new or uncontrolled symptoms. Verbalized understanding.     TORB 2/27/19 1320 Meka KATE/Gretchen Carrizales RN: OK to treat today. Send UA as ordered. Give 1 L NS with treatment. Obtain orthostatic vitals prior to starting IVF.     IVF given as ordered. UA sent. Patient reported dizziness with position change on orthostatics. See flowsheets for BP/HR. Patient educated to change positions slowly. HUMBLE Solares updated on orthostatics. Per HUMBLE Ackerman, no intervention needed today for UA results. Patient aware. No other concerns at this time.     Intravenous Access:  Peripheral IV placed in lab.    Treatment Conditions:  Lab Results   Component Value Date     02/27/2019                   Lab Results   Component Value Date    POTASSIUM 3.7 02/27/2019           No results found for: MAG         Lab Results   Component Value Date    CR 0.78 02/27/2019                   Lab Results   Component Value Date    JANAY 7.9 02/27/2019                Lab Results   Component Value Date    BILITOTAL 0.3 02/27/2019           Lab Results   Component Value Date    ALBUMIN 2.6 02/27/2019                    Lab Results   Component Value Date    ALT 54 02/27/2019           Lab Results   Component Value Date    AST 35 02/27/2019     Results reviewed, labs MET treatment parameters, ok to proceed  with treatment.      Post Infusion Assessment:  Patient tolerated infusion without incident.  Blood return noted pre and post infusion.  Access discontinued per protocol.    Discharge Plan:   Prescription refills given for Compazine.  Discharge instructions reviewed with: Patient and Family.  Patient and family verbalized understanding of discharge instructions and all questions answered.  AVS to patient via [employer].  Patient will return 3/20/19 per check out orders. Patient verbalized understanding of when she should come back and aware to look for appointments on MyChart.   Patient discharged in stable condition in wheelchair accompanied by: .  Face to Face time: 5.    ITZEL SUÁREZ RN

## 2019-03-06 NOTE — TELEPHONE ENCOUNTER
Per Patient's request,  completed and faxed [employer] [place] request for lodging dates 3/20-3/21. [facility] [place] will contact Patient for confirmation of reservation.  will continue to provide support as needed.    Soo Yeon Han, [facility] Center[facility]  Pager:  [phone]

## 2019-03-20 PROBLEM — M19.90 ARTHRITIS: Status: ACTIVE | Noted: 2019-01-01

## 2019-03-20 NOTE — PROGRESS NOTES
DEPARTMENT OF NEUROLOGY  Referral for: Cognitive Smith, tremors and myoclonus  Patient Name: Khloe Tamez  MRN: [id number]  : 1955  Date of Clinic Visit: 2019  Primary Care Provider: Aide Robertson  Referring Provider: Meka Payne    CHIEF COMPLAINT: Worsening cognition, abnormal movements      HISTORY OF PRESENT ILLNESS:  Khloe Tamez is a 63 year old female presenting with complaints about declining cognition and abnormal movements.  She has a history of melanoma of the anus with metastases to her lung, gallbladder, and inguinal lymph node; hypothyroidism currently treated; and arthritis.   She presents today with her  and her daughter who provided additional information.  Regarding the cognitive decline they first really noticed it back in January of this year, although they later retrospectively thought that perhaps she had been having some decline in the 2018.  They definitely noticed a change after melanoma removal in .  Her daughter says that her mom would call her and she would have such bad word finding difficulties that she would not be able to complete sentences.  There does not appear to have been any comprehension difficulties during this time.  Her symptoms progressively worsened over a few weeks and she was found to have a UTI during that time that was untreated.  She was never bacteremic or septic and she was adequately treated with Bactrim.  After treatment, her symptoms drastically improved with mild fluctuations.  She notes that she had some vertigo that started after Bactrim, that is worse when she moves her head or lays down.  She denies lightheadedness.  Since then, she has had reduced cognitive reserve.  She will need to take a nap after only 5 minutes of reading.  She also notes that she is having trouble completing puzzles, she can match the colors but she is unable to understand how the shapes fits together.  Her and her family deny  any issue with immediate or long-term memory.  She has not had any personality changes nor forgotten her identity.  She has been compliant with her medications during this time.  They do think that she is a bit more anxious, possibly related to some of this confusion.  Her and her daughter also note that she will have very vivid dreams and will sometimes have difficulty understanding the events in the dream did not occur in reality, although she will come to realize this within a day or 2.  She denies any visual or auditory hallucinations.  Breathing is not difficult for her.  She denies any positive or negative visual symptoms.  Regarding sleep, she will typically go to bed at 1900, wake up at over 300 which is pretty typical for her.  Because of this cognitive change, she underwent brain MRI with and without contrast in February 2019 which was negative for any metastases.  Her diet has also been somewhat poor during this time, she will typically eat a breakfast sandwich and perhaps some soup at lunch and some timoteo crackers for snack; she does not routinely eats fresh vegetables.  Does not use recreational/illicit substances.   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  The tremor is typically worse when she is trying to do something with her hand like writing.  The myoclonic jerks involve the trunk, arms and legs, although the legs are more involved at night.  Her  says that she can make the entire bed shake with her jerking.  She can sometimes even have a sequence of myoclonus, but this does not persist for more than a few seconds according to her .  He is not sure if these events occur earlier or later in the night.  She denies any symptoms of restless legs.  There is never a loss of consciousness with these movements.  She has never had a seizure, no risk factors for seizure, no family history of seizures.   There is no family  "history of movement disorders.  They do note that the family has a history of \"bad feet\" involving abnormal toes but they do not endorse hammertoes. She has a brother and sister who don't have a tremor. Her mother had ALS, passed away at 61. Father had RA. No Alzheimers or Parkinson's.      ASSESSMENT AND PLAN:  Mrs. Tamez is a 63-year-old female with metastatic melanoma not including the brain, presenting with nearly 3 months of cognitive decline and motor excitability.  Cognitive decline has reportedly been improving over the past few months and has been exacerbated by recurrent UTI which is being treated at this time.  However, I would not expect a UTI to reduce the level of dysfunction that her family indicated, suggesting that perhaps there was another process already occurring that lowered the threshold for neuronal dysfunction.  The motor excitability includes tremors and myoclonus and has been present for about the same amount of time.  It is difficult to unite her range of symptoms with a single diagnosis, but the most likely single explanation might be a paraneoplastic syndrome or related to her melanoma.  Melanoma is most commonly associated with anti-amphiphysin antibodies which is not typically associated with her motor symptoms.  Her motor symptoms are reminiscent of Phil syndrome, and her CNS involvement hints at Morvan's syndrome, but this is not really known to be associated with melanoma, rather thymoma is most often associated.  Rapid cognitive decline over a period of weeks to months can be seen in sCJD, although we think this is less likely given that her family is reporting major improvement from her poorest functioning.  Clinical picture does not quite fit with a neurodegenerative process like Alzheimer's or frontotemporal dementia.  Her abnormal movements were not suggestive of Parkinson's disease.  I think it is unlikely that her cognitive and motor symptoms are a result of medications. " " I think at this point we should pursue an array of tests congruent with the array of signs and symptoms to try to narrow down the location of her dysfunction.    Plan:  -MRI brain and cervical spine with and without contrast to assess for metastases  -EMG to assess for muscle hyperexcitability  -EEG to assess cortical function  -Schedule diagnostic lumbar puncture via IR  -CSF labs: Glucose, protein, cell count and differential in Tubes 1 and 4, [place] PAC1 paraneoplastic panel, RT-QuIC  -Blood labs: B12, MMA, [place] PAVAL paraneoplastic panel  -Return to clinic in 4 weeks    Patient was seen and discussed with Dr. Palomo Malloy MD  Neurology PGY4  [facility]      PHYSICAL EXAMINATION:  Vitals: /66   Pulse 105   Temp 98  F (36.7  C) (Oral)   Resp 16   Ht 1.702 m (5' 7.01\")   Wt 84.8 kg (187 lb)   SpO2 96%   BMI 29.28 kg/m    General: Sitting in a wheelchair (for safety, not necessary), her  and daughter are present  HEENT: Normocephalic, moist membranes  Cardiac: Normotensive slightly tachycardic  Pulmonary: Nonlabored on room air  Abdomen: Nondistended  Extremities: The second toe on the left foot is deviated toward the first digits, there are no hammertoes or pes cavus  Skin: No rashes, bruising  Psych: Soft spoken and appropriate  Neuro:   Mental status: alert; she scored 21/30 on the MoCA, missed points for copying a box, could not perform serial sevens, could not name 11 words starting with the letter S, and recalled only 1 of 5 words   Cranial nerves: PERRL, EOMI with intact smooth pursuit, full visual fields, no facial asymmetry or ptosis, facial sensation intact and symmetric, hearing intact to conversation, tongue and uvula are midline, no hypophonia, no dysarthria, no tongue fasciculations appreciated   Motor: At rest she adopts a posture with left shoulder higher than the right; muscle bulk is symmetric, no focal atrophy including at the FDI; there were no " obvious fasciculations but there may have been a few instances of small distal upper extremity fasciculations but again these were not obvious; tone in the upper extremities was mildly increased but it was more like paratonia than cogwheeling rigidity, certainly no velocity component to it; she has bilateral upper extremity tremor that is less apparent at rest and exacerbated when holding a posture or performing actions; she has intermittent truncal and proximal upper extremity small amplitude myoclonic jerks that do not persist; no athetoid movements, no ballism   RIGHT LEFT    5 5   Biceps 5 5   Triceps 5 5   Traps 5 5   Hip flexion 5 5   Knee extension 4+ 4+   Knee flexion 5 5   Dorsiflexion 5 5   Plantar flexion 5 5    Reflexes: Babinski absent bilaterally; absent Nava; no exaggerated startle response   RIGHT LEFT   Brachioradialis 3+ 3+   Biceps 3+ 3+   Patellar 3+ 3+   Achilles 3+ 3+    Sensory: Intact to light touch and vibration in all extremities without extinction. Romberg exam notable for some swaying but she was stable   Coordination: No dysmetria on finger-nose-finger test, no ataxia, no dysdiadochokinesia   Gait: Able to stand without assistance, cautious gait, abnormal pull test    INVESTIGATIONS:  MRI brain with and without contrast from February 2019 was personally reviewed.  There were no signs of suggesting brain metastases at that time.      REVIEW OF SYSTEMS: 12-point RoS negative except as per HPI.      ALLERGIES:  Allergies   Allergen Reactions     Bactrim [Sulfamethoxazole W/Trimethoprim] Rash     Red bumps on skin with burning, burning in mouth, shakiness     Contrast Dye Rash     Pt. Stated rash on chest and itching after most recent CT scan with contrast.     Bee Venom Other (See Comments)     Rapid heart rate       MEDICATIONS:  Current Outpatient Medications   Medication Sig Dispense Refill     acetaminophen (TYLENOL ARTHRITIS PAIN) 650 MG CR tablet Take 650 mg by mouth every 6  hours as needed for mild pain or fever       calcium carbonate (OS-JANAY 500 MG [place]) 1250 MG tablet Take 1 tablet by mouth every evening        ciprofloxacin (CIPRO) 500 MG tablet        levothyroxine (SYNTHROID/LEVOTHROID) 125 MCG tablet Take 1 tablet (125 mcg) by mouth daily 30 tablet 1     loperamide (IMODIUM) 2 MG capsule Take 2 mg by mouth 4 times daily as needed for diarrhea       LORazepam (ATIVAN) 0.5 MG tablet Take 1 tablet (0.5 mg) by mouth every 4 hours as needed (Anxiety, Nausea/Vomiting or Sleep) 30 tablet 3     LORazepam (ATIVAN) 0.5 MG tablet Take 1 tablet (0.5 mg) by mouth every 4 hours as needed (Anxiety, Nausea/Vomiting or Sleep) 30 tablet 3     prochlorperazine (COMPAZINE) 10 MG tablet Take 1 tablet (10 mg) by mouth every 6 hours as needed (Nausea/Vomiting) 30 tablet 3     prochlorperazine (COMPAZINE) 10 MG tablet Take 1 tablet (10 mg) by mouth every 6 hours as needed (Nausea/Vomiting) 30 tablet 3     benzonatate (TESSALON) 100 MG capsule Take 1 capsule (100 mg) by mouth 2 times daily as needed for cough (Patient not taking: Reported on 2/11/2019) 60 capsule 3     DIPHENHYDRAMINE HCL PO Take 25 mg by mouth nightly as needed       guaiFENesin (MUCINEX) 600 MG 12 hr tablet Take 1,200 mg by mouth 2 times daily as needed        MAGNESIUM PO Take by mouth At Bedtime       polyethylene glycol (MIRALAX/GLYCOLAX) packet Take 1 packet by mouth daily       Psyllium (METAMUCIL PO) Take 1 packet by mouth       VITAMIN D, CHOLECALCIFEROL, PO Take by mouth every evening          PAST MEDICAL HISTORY:  Past Medical History:   Diagnosis Date     Melanoma malignant, perianal skin (H)      Motion sickness      Obesity (BMI 30.0-34.9)      Osteoarthritis      PONV (postoperative nausea and vomiting)        PAST SURGICAL HISTORY:  Past Surgical History:   Procedure Laterality Date     BIOPSY NODE SENTINEL Left 5/2/2018    Procedure: BIOPSY NODE SENTINEL;  [place] Lymph Node Biopsy x3, Lymphatic mapping;  Surgeon:  Adams Shah MD;  Location: [facility] OR      SECTION       COLONOSCOPY       EXAM UNDER ANESTHESIA ANUS N/A 2018    Procedure: EXAM UNDER ANESTHESIA ANUS;  Examination Under Anesthesia Anus, Local Excision of Perianal Melanoma;  Surgeon: Ronald Wright MD;  Location: [facility] OR     EXAM UNDER ANESTHESIA, EXCISE LESION RECTUM, COMBINED N/A 2019    Procedure: 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 No pertinent family history.    SOCIAL HISTORY:  Social History     Socioeconomic History     Marital status:      Spouse name: Not on file     Number of children: Not on file     Years of education: Not on file     Highest education level: Not on file   Occupational History     Not on file   Social Needs     Financial resource strain: Not on file     Food insecurity:     Worry: Not on file     Inability: Not on file     Transportation needs:     Medical: Not on file     Non-medical: Not on file   Tobacco Use     Smoking status: Never Smoker     Smokeless tobacco: Never Used   Substance and Sexual Activity     Alcohol use: No     Drug use: No     Sexual activity: Not on file   Lifestyle     Physical activity:     Days per week: Not on file     Minutes per session: Not on file     Stress: Not on file   Relationships     Social connections:     Talks on phone: Not on file     Gets together: Not on file     Attends Buddhist service: Not on file     Active member of club or organization: Not on file     Attends meetings of clubs or organizations: Not on file     Relationship status: Not on file     Intimate partner violence:     Fear of current or ex partner: Not on file     Emotionally  abused: Not on file     Physically abused: Not on file     Forced sexual activity: Not on file   Other Topics Concern     Parent/sibling w/ CABG, MI or angioplasty before 65F 55M? Not Asked   Social History Narrative     Not on file     This note was written with the assistance of the Dragon voice-dictation technology software. The final document, although reviewed, may contain errors. For corrections, please contact the office.

## 2019-03-20 NOTE — PATIENT INSTRUCTIONS
1. We will schedule you for MRI Brain and C-spine with and without contrast  2. We will schedule you for an EEG, a brainwave test  3. We will schedule you for an EMG, a test of nerves and muscles  4. We will schedule you for a lumbar puncture with Interventional Radiology and we will send CSF and blood samples for labs.

## 2019-03-20 NOTE — NURSING NOTE
Chief Complaint   Patient presents with     Consult     [facility] NEW LIGHT HEADED/CONFUSION/MUSCLE SPASMS/MOTOR ISSUES       Jj Tamez, EMT

## 2019-03-20 NOTE — LETTER
3/20/2019       RE: Khloe Tamez  [address] [address]  [place] [address]     Dear Colleague,    Thank you for referring your patient, Khloe Tamez, to the [facility] NEUROLOGY at [facility]. Please see a copy of my visit note below.      DEPARTMENT OF NEUROLOGY  Referral for: Cognitive Smith, tremors and myoclonus  Patient Name: Khloe Tamez  MRN: [id number]  : 1955  Date of Clinic Visit: 2019  Primary Care Provider: Aide Robertson  Referring Provider: Meka Payne    CHIEF COMPLAINT: Worsening cognition, abnormal movements    HISTORY OF PRESENT ILLNESS:  Khloe Tamez is a 63 year old female presenting with complaints about declining cognition and abnormal movements.  She has a history of melanoma of the anus with metastases to her lung, gallbladder, and inguinal lymph node; hypothyroidism currently treated; and arthritis.   She presents today with her  and her daughter who provided additional information.  Regarding the cognitive decline they first really noticed it back in January of this year, although they later retrospectively thought that perhaps she had been having some decline in the 2018.  They definitely noticed a change after melanoma removal in .  Her daughter says that her mom would call her and she would have such bad word finding difficulties that she would not be able to complete sentences.  There does not appear to have been any comprehension difficulties during this time.  Her symptoms progressively worsened over a few weeks and she was found to have a UTI during that time that was untreated.  She was never bacteremic or septic and she was adequately treated with Bactrim.  After treatment, her symptoms drastically improved with mild fluctuations.  She notes that she had some vertigo that started after Bactrim, that is worse when she moves her head or lays down.  She denies lightheadedness.  Since  then, she has had reduced cognitive reserve.  She will need to take a nap after only 5 minutes of reading.  She also notes that she is having trouble completing puzzles, she can match the colors but she is unable to understand how the shapes fits together.  Her and her family deny any issue with immediate or long-term memory.  She has not had any personality changes nor forgotten her identity.  She has been compliant with her medications during this time.  They do think that she is a bit more anxious, possibly related to some of this confusion.  Her and her daughter also note that she will have very vivid dreams and will sometimes have difficulty understanding the events in the dream did not occur in reality, although she will come to realize this within a day or 2.  She denies any visual or auditory hallucinations.  Breathing is not difficult for her.  She denies any positive or negative visual symptoms.  Regarding sleep, she will typically go to bed at 1900, wake up at over 300 which is pretty typical for her.  Because of this cognitive change, she underwent brain MRI with and without contrast in February 2019 which was negative for any metastases.  Her diet has also been somewhat poor during this time, she will typically eat a breakfast sandwich and perhaps some soup at lunch and some timoteo crackers for snack; she does not routinely eats fresh vegetables.  Does not use recreational/illicit substances.   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  The tremor is typically worse when she is trying to do something with her hand like writing.  The myoclonic jerks involve the trunk, arms and legs, although the legs are more involved at night.  Her  says that she can make the entire bed shake with her jerking.  She can sometimes even have a sequence of myoclonus, but this does not persist for more than a few seconds according to her .  He  "is not sure if these events occur earlier or later in the night.  She denies any symptoms of restless legs.  There is never a loss of consciousness with these movements.  She has never had a seizure, no risk factors for seizure, no family history of seizures.   There is no family history of movement disorders.  They do note that the family has a history of \"bad feet\" involving abnormal toes but they do not endorse hammertoes. She has a brother and sister who don't have a tremor. Her mother had ALS, passed away at 61. Father had RA. No Alzheimers or Parkinson's.      ASSESSMENT AND PLAN:  Mrs. Tamez is a 63-year-old female with metastatic melanoma not including the brain, presenting with nearly 3 months of cognitive decline and motor excitability.  Cognitive decline has reportedly been improving over the past few months and has been exacerbated by recurrent UTI which is being treated at this time.  However, I would not expect a UTI to reduce the level of dysfunction that her family indicated, suggesting that perhaps there was another process already occurring that lowered the threshold for neuronal dysfunction.  The motor excitability includes tremors and myoclonus and has been present for about the same amount of time.  It is difficult to unite her range of symptoms with a single diagnosis, but the most likely single explanation might be a paraneoplastic syndrome or related to her melanoma.  Melanoma is most commonly associated with anti-amphiphysin antibodies which is not typically associated with her motor symptoms.  Her motor symptoms are reminiscent of Phil syndrome, and her CNS involvement hints at Morvan's syndrome, but this is not really known to be associated with melanoma, rather thymoma is most often associated.  Rapid cognitive decline over a period of weeks to months can be seen in sCJD, although we think this is less likely given that her family is reporting major improvement from her poorest " "functioning.  Clinical picture does not quite fit with a neurodegenerative process like Alzheimer's or frontotemporal dementia.  Her abnormal movements were not suggestive of Parkinson's disease.  I think it is unlikely that her cognitive and motor symptoms are a result of medications.  I think at this point we should pursue an array of tests congruent with the array of signs and symptoms to try to narrow down the location of her dysfunction.    Plan:  -MRI brain and cervical spine with and without contrast to assess for metastases  -EMG to assess for muscle hyperexcitability  -EEG to assess cortical function  -Schedule diagnostic lumbar puncture via IR  -CSF labs: Glucose, protein, cell count and differential in Tubes 1 and 4, Natalbany PAC1 paraneoplastic panel, RT-QuIC  -Blood labs: B12, MMA, Natalbany PAVAL paraneoplastic panel  -Return to clinic in 4 weeks    Patient was seen and discussed with Dr. Hernández.    Javy Malloy MD  Neurology PGY4  [facility]      PHYSICAL EXAMINATION:  Vitals: /66   Pulse 105   Temp 98  F (36.7  C) (Oral)   Resp 16   Ht 1.702 m (5' 7.01\")   Wt 84.8 kg (187 lb)   SpO2 96%   BMI 29.28 kg/m     General: Sitting in a wheelchair (for safety, not necessary), her  and daughter are present  HEENT: Normocephalic, moist membranes  Cardiac: Normotensive slightly tachycardic  Pulmonary: Nonlabored on room air  Abdomen: Nondistended  Extremities: The second toe on the left foot is deviated toward the first digits, there are no hammertoes or pes cavus  Skin: No rashes, bruising  Psych: Soft spoken and appropriate  Neuro:   Mental status: alert; she scored 21/30 on the MoCA, missed points for copying a box, could not perform serial sevens, could not name 11 words starting with the letter S, and recalled only 1 of 5 words   Cranial nerves: PERRL, EOMI with intact smooth pursuit, full visual fields, no facial asymmetry or ptosis, facial sensation intact and symmetric, " hearing intact to conversation, tongue and uvula are midline, no hypophonia, no dysarthria, no tongue fasciculations appreciated   Motor: At rest she adopts a posture with left shoulder higher than the right; muscle bulk is symmetric, no focal atrophy including at the FDI; there were no obvious fasciculations but there may have been a few instances of small distal upper extremity fasciculations but again these were not obvious; tone in the upper extremities was mildly increased but it was more like paratonia than cogwheeling rigidity, certainly no velocity component to it; she has bilateral upper extremity tremor that is less apparent at rest and exacerbated when holding a posture or performing actions; she has intermittent truncal and proximal upper extremity small amplitude myoclonic jerks that do not persist; no athetoid movements, no ballism   RIGHT LEFT    5 5   Biceps 5 5   Triceps 5 5   Traps 5 5   Hip flexion 5 5   Knee extension 4+ 4+   Knee flexion 5 5   Dorsiflexion 5 5   Plantar flexion 5 5    Reflexes: Babinski absent bilaterally; absent Nava; no exaggerated startle response   RIGHT LEFT   Brachioradialis 3+ 3+   Biceps 3+ 3+   Patellar 3+ 3+   Achilles 3+ 3+    Sensory: Intact to light touch and vibration in all extremities without extinction. Romberg exam notable for some swaying but she was stable   Coordination: No dysmetria on finger-nose-finger test, no ataxia, no dysdiadochokinesia   Gait: Able to stand without assistance, cautious gait, abnormal pull test    INVESTIGATIONS:  MRI brain with and without contrast from February 2019 was personally reviewed.  There were no signs of suggesting brain metastases at that time.    REVIEW OF SYSTEMS: 12-point RoS negative except as per HPI.    ALLERGIES:  Allergies   Allergen Reactions     Bactrim [Sulfamethoxazole W/Trimethoprim] Rash     Red bumps on skin with burning, burning in mouth, shakiness     Contrast Dye Rash     Pt. Stated rash on chest  and itching after most recent CT scan with contrast.     Bee Venom Other (See Comments)     Rapid heart rate       MEDICATIONS:  Current Outpatient Medications   Medication Sig Dispense Refill     acetaminophen (TYLENOL ARTHRITIS PAIN) 650 MG CR tablet Take 650 mg by mouth every 6 hours as needed for mild pain or fever       calcium carbonate (OS-JANAY 500 MG [place]. [place]) 1250 MG tablet Take 1 tablet by mouth every evening        ciprofloxacin (CIPRO) 500 MG tablet        levothyroxine (SYNTHROID/LEVOTHROID) 125 MCG tablet Take 1 tablet (125 mcg) by mouth daily 30 tablet 1     loperamide (IMODIUM) 2 MG capsule Take 2 mg by mouth 4 times daily as needed for diarrhea       LORazepam (ATIVAN) 0.5 MG tablet Take 1 tablet (0.5 mg) by mouth every 4 hours as needed (Anxiety, Nausea/Vomiting or Sleep) 30 tablet 3     LORazepam (ATIVAN) 0.5 MG tablet Take 1 tablet (0.5 mg) by mouth every 4 hours as needed (Anxiety, Nausea/Vomiting or Sleep) 30 tablet 3     prochlorperazine (COMPAZINE) 10 MG tablet Take 1 tablet (10 mg) by mouth every 6 hours as needed (Nausea/Vomiting) 30 tablet 3     prochlorperazine (COMPAZINE) 10 MG tablet Take 1 tablet (10 mg) by mouth every 6 hours as needed (Nausea/Vomiting) 30 tablet 3     benzonatate (TESSALON) 100 MG capsule Take 1 capsule (100 mg) by mouth 2 times daily as needed for cough (Patient not taking: Reported on 2/11/2019) 60 capsule 3     DIPHENHYDRAMINE HCL PO Take 25 mg by mouth nightly as needed       guaiFENesin (MUCINEX) 600 MG 12 hr tablet Take 1,200 mg by mouth 2 times daily as needed        MAGNESIUM PO Take by mouth At Bedtime       polyethylene glycol (MIRALAX/GLYCOLAX) packet Take 1 packet by mouth daily       Psyllium (METAMUCIL PO) Take 1 packet by mouth       VITAMIN D, CHOLECALCIFEROL, PO Take by mouth every evening          PAST MEDICAL HISTORY:  Past Medical History:   Diagnosis Date     Melanoma malignant, perianal skin (H)      Motion sickness      Obesity (BMI 30.0-34.9)       Osteoarthritis      PONV (postoperative nausea and vomiting)        PAST SURGICAL HISTORY:  Past Surgical History:   Procedure Laterality Date     BIOPSY NODE SENTINEL Left 2018    Procedure: BIOPSY NODE SENTINEL;  [place] Lymph Node Biopsy x3, Lymphatic mapping;  Surgeon: Adams Shah MD;  Location: [facility] OR      SECTION       COLONOSCOPY       EXAM UNDER ANESTHESIA ANUS N/A 2018    Procedure: EXAM UNDER ANESTHESIA ANUS;  Examination Under Anesthesia Anus, Local Excision of Perianal Melanoma;  Surgeon: Ronald Wright MD;  Location: [facility] OR     EXAM UNDER ANESTHESIA, EXCISE LESION RECTUM, COMBINED N/A 2019    Procedure: 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 No pertinent family history.    SOCIAL HISTORY:  Social History     Socioeconomic History     Marital status:      Spouse name: Not on file     Number of children: Not on file     Years of education: Not on file     Highest education level: Not on file   Occupational History     Not on file   Social Needs     Financial resource strain: Not on file     Food insecurity:     Worry: Not on file     Inability: Not on file     Transportation needs:     Medical: Not on file     Non-medical: Not on file   Tobacco Use     Smoking status: Never Smoker     Smokeless tobacco: Never Used   Substance and Sexual Activity     Alcohol use: No     Drug use: No     Sexual activity: Not on file   Lifestyle     Physical activity:     Days per week: Not on file     Minutes per session: Not on file     Stress: Not on file   Relationships     Social connections:     Talks on phone: Not on file     Gets together: Not on file      Attends Muslim service: Not on file     Active member of club or organization: Not on file     Attends meetings of clubs or organizations: Not on file     Relationship status: Not on file     Intimate partner violence:     Fear of current or ex partner: Not on file     Emotionally abused: Not on file     Physically abused: Not on file     Forced sexual activity: Not on file   Other Topics Concern     Parent/sibling w/ CABG, MI or angioplasty before 65F 55M? Not Asked   Social History Narrative     Not on file     This note was written with the assistance of the Dragon voice-dictation technology software. The final document, although reviewed, may contain errors. For corrections, please contact the office.    Again, thank you for allowing me to participate in the care of your patient.      Sincerely,    Javy Malloy MD

## 2019-03-21 NOTE — NURSING NOTE
Chief Complaint   Patient presents with     Labs Only     venipuncture, vitals checked     PIV placed for inufsion    Shruthi Salgado RN on 3/21/2019 at 9:22 AM

## 2019-03-21 NOTE — NURSING NOTE
"Oncology Rooming Note    March 21, 2019 9:28 AM   Khloe Tamez is a 63 year old female who presents for:    Chief Complaint   Patient presents with     Labs Only     venipuncture, vitals checked     RECHECK     ONC Melanoma CA      Initial Vitals: /75   Pulse 96   Temp 98.7  F (37.1  C)   Resp 16   Ht 1.702 m (5' 7.01\")   Wt 81.8 kg (180 lb 6.4 oz)   SpO2 96%   BMI 28.25 kg/m   Estimated body mass index is 28.25 kg/m  as calculated from the following:    Height as of this encounter: 1.702 m (5' 7.01\").    Weight as of this encounter: 81.8 kg (180 lb 6.4 oz). Body surface area is 1.97 meters squared.  No Pain (0) Comment: Data Unavailable   No LMP recorded. Patient is postmenopausal.  Allergies reviewed: Yes  Medications reviewed: Yes    Medications: MEDICATION REFILLS NEEDED TODAY. Provider was notified.  Pharmacy name entered into [facility]:    [id number] - [facility] [address]  [place] [address] [place] [facility] [address] [place] [address] [address]    Clinical concerns: none        Ese João, CMA              "

## 2019-03-21 NOTE — LETTER
3/21/2019       RE: Khloe Tamez  [address]  [place] [address]     Dear Colleague,    Thank you for referring your patient, Khloe Tamez, to the [facility]. Please see a copy of my visit note below.    [facility]  MEDICAL ONCOLOGY PROGRESS NOTE  Mar 21, 2019    CHIEF COMPLAINT: Perianal melanoma    Melanoma History:.  1. 3/27/18 patient had perianal lesion biopsied in [place] ([id number]). This showed malignant melanoma, positive for HMB45 and S100, and negative for CD45 and pancytokeratin. Breslow depth at least 3.5 mm, non-ulcerated, with 5 mitoses/mm2, TILs present with deep and peripheral margins positive; pT3a.  2. 3/29/18, PET-CT showed a perianal FDG avid lesion (SUV max 10.8) and a superficial left inguinal lymph node measuring 0.9 cm (SUV max 4.1).  3. 4/13/18, she has wide local excision of a 2 x 2.5 cm deep perianal melanoma with anaplastic closure, under Dr. Wright. Pathology showed a nodular melanoma, Breslow depth 6.0 mm to mark's level IV, there were more than 10 mitoses per mm2, with ulceration, and lymphovascular invasion present. No perineural invasion or microsatellitosis. Tumor infiltrating lymphocytes present, non-brisk. AJCC microstage: pT4b.  4. 4/26/18, she has MRI brain, which is negative for metastatic disease.  5. 5/2/18, she returns to OR under Dr. Shah for left femoral sentinel lymph node biopsy, with 2 (of 3) involved lymph nodes. IHC positive for Melan-A, tyrosinase, and HMB-45. No definite extranodal extension.  6  5/31/18, she begins treatment with adjuvant nivolumab  7. 6/11/18, [employer] testing shows PD-L1 0% by IHC, with microsatellite stability (MARY LOU) and tumor mutational burden low at 4 per Mb DNA. Mutations seen in STK11 V4fs*160, BRAF D594G, SF3B1 R625H and KIT amplification.  8. 7/2/18, colonosocopy with diverticulosis and one sessile serrated adenoma and two tubular adenomas.  9. 11/15/18, CT-CAP showed no  evidence of metastatic disease.  10. 12/19/18, she has bloody loose stool and seen in ER with CT-scan showing a small region of circumferential wall thickening around the rectosigmoid junction.  11. 1/8/19, she sees Dr. Wright. He performed anoscopy, which showed a 2.5 cm lesion in the left posterior postiion with central depression. Biopsies taken (Specimen #: ) and pathology returns recurrent melanoma.  12. 1/15/19, MRI pelvis obtained, which showed lower rectal 4 cm enhancing lobulated mass, also involving the anal verge, consistent with biopsy-proven anal melanoma.  It abuts the  internal anal sphincter. There are also scattered adjacent perirectal lymph nodes some of them with suspicious morphology.  13. 1/24/19, she has PET-CT scan, which showed the 4 cm rectal mass, an FDG avid right inguinal lymph node, and a nodular focus of FDG uptake within the galllbadder. There is also a 5 mm solid nodule in the right lower lobe, which has increased from 3 mm on 1/24/19. Remainder of sub 1-cm nodules stable in appearance.  14. 1/25/19, she has transanal excision of recurrent anal melanoma, under Dr. Wright. Surgical pathology (Specimen #: [id number]) showed an ulcerated melanoma lesion to depth 7 mm without evidence of lymphovascular invasion. The lesion appeared completely resected.  15. 2/27/19, she receives her first dose of nivolumab plus ipilimumab.      HISTORY OF PRESENT ILLNESS  Khloe Tamez is a 63 year old female from [place], [place] with resected perianal melanoma. She presents today for re-evaluation prior to consideration of cycle 2 nivolumab and ipilimumab. She is accompanied today by her daughter and .    She has continued with weakness, intermittent confusion, short term memory loss and myoclonic jerking. She initially improved following treatment of her first UTI infection, but UTI symptoms returned and she was again treated with antibiotics. According to her family, she definitely  worsens during these UTI episodes and appears almost back to baseline when she recovers from infection. To help sort out the neurologic symptoms she was seen by Dr. Malloy in neurology, who felt that there may be an underlying process that lowers the threshold for neuronal dysfunction. He felt the most likely explanation to be a paraneoplastic syndrome and he is working this up with tests to understand the process better.    Other than the neurologic signs and symptoms, the patient continues to endorse poor appetite and weight loss. Today she weights 180 lbs, down from 198 lbs in January. She has an occasional cough, but denies shortness of breath, nausea, vomiting, or abdominal pain. She has had no fevers or chills. No new palpable lumps or bumps. Her mood is quite good, and she denies any pain at this time.    Labs today show normal renal function. Creatinine 0.78. Liver function is normal. Albumin is 2.9. TSH is 0.22 and Free T4 1.6. B12 is normal at 685, and the MMA level is normal at 0.24, also suggesting normal vitamin B12 levels. CBC is normal with WBC 8.8, hemoglobin 13.1, platelets 389.     ECOG performance status is 2.      REVIEW OF SYSTEMS  A 12-point ROS negative except as in HPI.    Current Outpatient Medications   Medication     calcium carbonate (OS-JANAY 500 MG [place]. CA) 1250 MG tablet     ciprofloxacin (CIPRO) 500 MG tablet     levothyroxine (SYNTHROID/LEVOTHROID) 125 MCG tablet     loperamide (IMODIUM) 2 MG capsule     LORazepam (ATIVAN) 0.5 MG tablet     prochlorperazine (COMPAZINE) 10 MG tablet     DIPHENHYDRAMINE HCL PO     guaiFENesin (MUCINEX) 600 MG 12 hr tablet     MAGNESIUM PO     polyethylene glycol (MIRALAX/GLYCOLAX) packet     VITAMIN D, CHOLECALCIFEROL, PO     No current facility-administered medications for this visit.        Past Medical History:   Diagnosis Date     Melanoma malignant, perianal skin (H)      Motion sickness      Obesity (BMI 30.0-34.9)      Osteoarthritis      PONV  "(postoperative nausea and vomiting)      Past Surgical History:   Procedure Laterality Date     BIOPSY NODE SENTINEL Left 2018    Procedure: BIOPSY NODE SENTINEL;  [place] Lymph Node Biopsy x3, Lymphatic mapping;  Surgeon: Adams Shah MD;  [facility]      SECTION       COLONOSCOPY       EXAM UNDER ANESTHESIA ANUS N/A 2018    Procedure: EXAM UNDER ANESTHESIA ANUS;  Examination Under Anesthesia Anus, Local Excision of Perianal Melanoma;  Surgeon: Ronald Wright MD;  [facility]     EXAM UNDER ANESTHESIA, EXCISE LESION RECTUM, COMBINED N/A 2019    Procedure: Local Trans anal excision Of recurrent Anal Melanoma;  Surgeon: Ronald Wright MD;  [facility]     EXCISE LESION RECTUM N/A 2018    Procedure: EXCISE LESION RECTUM;;  Surgeon: Ronald Wright MD;  [facility]     PROCTOSCOPY N/A 2019    Procedure: Proctoscopy;  Surgeon: Ronald Wright MD;  [facility]       SOCIAL HISTORY  She has worked as a caregiver, currently works at assisted living facility. Lifelong nonsmoker. No significant alcohol. She has twins.      FAMILY HISTORY  Father had a skin cancer, unsure if melanoma. Paternal aunt had stomach cancer.    PHYSICAL EXAMINATION  /75   Pulse 96   Temp 98.7  F (37.1  C)   Resp 16   Ht 1.702 m (5' 7.01\")   Wt 81.8 kg (180 lb 6.4 oz)   SpO2 96%   BMI 28.25 kg/m     Wt Readings from Last 10 Encounters:   19 81.6 kg (180 lb)   19 81.8 kg (180 lb 6.4 oz)   19 84.8 kg (187 lb)   19 85.1 kg (187 lb 9.6 oz)   19 86 kg (189 lb 9.5 oz)   01/15/19 89.8 kg (198 lb)   19 89.9 kg (198 lb 4.8 oz)   19 90.2 kg (198 lb 14.4 oz)   18 93.9 kg (207 lb)   11/15/18 93.9 kg (207 lb)     Physical Exam   Constitutional: She is oriented to person, place, and time. She appears well-developed and well-nourished.   HENT:   Mouth/Throat: Oropharynx is clear and moist.   Eyes: EOM are normal. Pupils are equal, round, and " reactive to light. No scleral icterus.   Neck: Normal range of motion. Neck supple.   Cardiovascular: Regular rhythm.   Pulmonary/Chest: Effort normal and breath sounds normal. She has no wheezes.   Abdominal: Soft. She exhibits no mass.   Musculoskeletal: Normal range of motion. She exhibits no edema or tenderness.   Neurological: She is alert and oriented to person, place, and time. No cranial nerve deficit or sensory deficit.   There is verbal slowness and she appears to forget what she is saying mid speech at times.  Symmetric 4/5 weakness. No sensory changes.   Skin: Skin is warm and dry. No rash noted. No pallor.   Psychiatric: She has a normal mood and affect. Thought content normal.   Nursing note and vitals reviewed.      ASSESSMENT AND PLAN  #1 Recurrent metastatic melanoma to bowel,  lymph nodes, gall bladder, and lung  #2 Resected Stage IIIC nodular melanoma, pT4b pN2b, perianal primary  It was a pleasure to see Ms. aTmez today. She is a 63 year old woman with metastatic anal melanoma. She received her first cycle of nivolumab and ipilimumab on 2/27/19. She continues to have difficulty with neurologic symptoms that may be attributed to melanoma. Neurology is working her up for potential causes. While this is ongoing I have recommended a pause on therapy. I will hold her 2nd dose of treatment for next week. At that time we will review her clinical status, and make sure she has had no worsening of her neurologic symptoms prior to her 2nd dose of treatment.    She agrees to proceed. We will arrange and get scheduled. Will see her back next week Thursday. We will plan to stage her with PET-CT at same time she is imaged with tests ordered by neurology.    #3 Deconditioning  #4 Malnutrition  We will need to work to improve her strength and her nutrition for consideration of clinical trials down the line if Ipi/Nivo were to be ineffective. She may benefit from physical rehab and nutrition consultation. Will  discuss on her return visit next week.    #5 Hypothyroidism  TSH is 0.22. We will continue to recheck, but she may benefit from a small dose reduction.    #6 Verbal and memory impairments  #7 Myoclonic jerking  I will see her back in 1 week. Patient already seen by neurologist, Dr. Malloy.      Multiple questions answered.    Nigel Person M.D.   of Medicine  Hematology, Oncology and Transplantation

## 2019-03-22 NOTE — PROGRESS NOTES
RADIOLOGY PROCEDURE NOTE  Patient name: Khloe Tamez  MRN: [id number]  : 1955    Pre-procedure diagnosis: Confusion  Post-procedure diagnosis: Same    Procedure Date/Time: 2019  2:33 PM  Procedure: LP at L2-L3  Estimated blood loss: None  Specimen(s) collected with description: 15 mL of clear CSF  The patient tolerated the procedure well with no immediate complications.  Significant findings:opening pressure of 17 cm of water    See imaging dictation for procedural details.    Provider name: Ryan Sands  Assistant(s):None

## 2019-03-22 NOTE — PROGRESS NOTES
Patient rested post LP for an hour, has been stable in vital signs and puncture site. Wheelchair escorted pt to bathroom. Daughter present to accompany patient then ready to discharge. Escorted to hospital door to private car with brother as . Patient has no c/o and all personal belongings sent. Discharge instructions previously reviewed with them by admitting RN
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
## 2019-03-22 NOTE — TELEPHONE ENCOUNTER
Health Call Center    Phone Message    May a detailed message be left on voicemail: no    Reason for Call: Other: Sandie from the lab at [facility] called to clarify the order for the spinal tap the Pt has scheduled today. She has a question about one of the tests (RT-QUIC). Please call back at [phone] and ask for send outs.      Action Taken: Message routed to:  [facility]UROLOGY [facility]ULT [facility]

## 2019-03-22 NOTE — TELEPHONE ENCOUNTER
Spoke with Dr. Hernández and let him know the concern that the lab had with one of the orders in regards to possible diagnosis of creutzfeldt-darling disease. Dr. Hernández stated that this is a possibility for diagnosis but not a definitive diagnosis.     The lab stated then they can not do this order and it will have to be done elsewhere because they are not equipped to handle this specimen and process it. They also stated that the glucose differential will be sent out to [facility] and it will take longer for them to get results because of this.    I sent a message to Dr. Hernández and Dr. Malloy informing them of this.

## 2019-03-24 NOTE — PROGRESS NOTES
[facility]  MEDICAL ONCOLOGY PROGRESS NOTE  Mar 21, 2019    CHIEF COMPLAINT: Perianal melanoma    Melanoma History:.  1. 3/27/18 patient had perianal lesion biopsied in [place] ([id number]). This showed malignant melanoma, positive for HMB45 and S100, and negative for CD45 and pancytokeratin. Breslow depth at least 3.5 mm, non-ulcerated, with 5 mitoses/mm2, TILs present with deep and peripheral margins positive; pT3a.  2. 3/29/18, PET-CT showed a perianal FDG avid lesion (SUV max 10.8) and a superficial left inguinal lymph node measuring 0.9 cm (SUV max 4.1).  3. 4/13/18, she has wide local excision of a 2 x 2.5 cm deep perianal melanoma with anaplastic closure, under Dr. Wright. Pathology showed a nodular melanoma, Breslow depth 6.0 mm to mark's level IV, there were more than 10 mitoses per mm2, with ulceration, and lymphovascular invasion present. No perineural invasion or microsatellitosis. Tumor infiltrating lymphocytes present, non-brisk. AJCC microstage: pT4b.  4. 4/26/18, she has MRI brain, which is negative for metastatic disease.  5. 5/2/18, she returns to OR under Dr. Shah for left femoral sentinel lymph node biopsy, with 2 (of 3) involved lymph nodes. IHC positive for Melan-A, tyrosinase, and HMB-45. No definite extranodal extension.  6  5/31/18, she begins treatment with adjuvant nivolumab  7. 6/11/18, [employer] testing shows PD-L1 0% by IHC, with microsatellite stability (MARY LOU) and tumor mutational burden low at 4 per Mb DNA. Mutations seen in STK11 V4fs*160, BRAF D594G, SF3B1 R625H and KIT amplification.  8. 7/2/18, colonosocopy with diverticulosis and one sessile serrated adenoma and two tubular adenomas.  9. 11/15/18, CT-CAP showed no evidence of metastatic disease.  10. 12/19/18, she has bloody loose stool and seen in ER with CT-scan showing a small region of circumferential wall thickening around the rectosigmoid junction.  11. 1/8/19, she sees Dr. Wright. He performed  anoscopy, which showed a 2.5 cm lesion in the left posterior postiion with central depression. Biopsies taken (Specimen #: ) and pathology returns recurrent melanoma.  12. 1/15/19, MRI pelvis obtained, which showed lower rectal 4 cm enhancing lobulated mass, also involving the anal verge, consistent with biopsy-proven anal melanoma.  It abuts the  internal anal sphincter. There are also scattered adjacent perirectal lymph nodes some of them with suspicious morphology.  13. 1/24/19, she has PET-CT scan, which showed the 4 cm rectal mass, an FDG avid right inguinal lymph node, and a nodular focus of FDG uptake within the galllbadder. There is also a 5 mm solid nodule in the right lower lobe, which has increased from 3 mm on 1/24/19. Remainder of sub 1-cm nodules stable in appearance.  14. 1/25/19, she has transanal excision of recurrent anal melanoma, under Dr. Wright. Surgical pathology (Specimen #: [id number]) showed an ulcerated melanoma lesion to depth 7 mm without evidence of lymphovascular invasion. The lesion appeared completely resected.  15. 2/27/19, she receives her first dose of nivolumab plus ipilimumab.      HISTORY OF PRESENT ILLNESS  Khloe Tamez is a 63 year old female from [place], [place] with resected perianal melanoma. She presents today for re-evaluation prior to consideration of cycle 2 nivolumab and ipilimumab. She is accompanied today by her daughter and .    She has continued with weakness, intermittent confusion, short term memory loss and myoclonic jerking. She initially improved following treatment of her first UTI infection, but UTI symptoms returned and she was again treated with antibiotics. According to her family, she definitely worsens during these UTI episodes and appears almost back to baseline when she recovers from infection. To help sort out the neurologic symptoms she was seen by Dr. Malloy in neurology, who felt that there may be an underlying process that  lowers the threshold for neuronal dysfunction. He felt the most likely explanation to be a paraneoplastic syndrome and he is working this up with tests to understand the process better.    Other than the neurologic signs and symptoms, the patient continues to endorse poor appetite and weight loss. Today she weights 180 lbs, down from 198 lbs in January. She has an occasional cough, but denies shortness of breath, nausea, vomiting, or abdominal pain. She has had no fevers or chills. No new palpable lumps or bumps. Her mood is quite good, and she denies any pain at this time.    Labs today show normal renal function. Creatinine 0.78. Liver function is normal. Albumin is 2.9. TSH is 0.22 and Free T4 1.6. B12 is normal at 685, and the MMA level is normal at 0.24, also suggesting normal vitamin B12 levels. CBC is normal with WBC 8.8, hemoglobin 13.1, platelets 389.     ECOG performance status is 2.      REVIEW OF SYSTEMS  A 12-point ROS negative except as in HPI.    Current Outpatient Medications   Medication     calcium carbonate (OS-JANAY 500 MG [place]. [place]) 1250 MG tablet     ciprofloxacin (CIPRO) 500 MG tablet     levothyroxine (SYNTHROID/LEVOTHROID) 125 MCG tablet     loperamide (IMODIUM) 2 MG capsule     LORazepam (ATIVAN) 0.5 MG tablet     prochlorperazine (COMPAZINE) 10 MG tablet     DIPHENHYDRAMINE HCL PO     guaiFENesin (MUCINEX) 600 MG 12 hr tablet     MAGNESIUM PO     polyethylene glycol (MIRALAX/GLYCOLAX) packet     VITAMIN D, CHOLECALCIFEROL, PO     No current facility-administered medications for this visit.        Past Medical History:   Diagnosis Date     Melanoma malignant, perianal skin (H)      Motion sickness      Obesity (BMI 30.0-34.9)      Osteoarthritis      PONV (postoperative nausea and vomiting)      Past Surgical History:   Procedure Laterality Date     BIOPSY NODE SENTINEL Left 5/2/2018    Procedure: BIOPSY NODE SENTINEL;  [place] Lymph Node Biopsy x3, Lymphatic mapping;  Surgeon: Adams Shah  "MD BLAKE;  [facility]      SECTION       COLONOSCOPY       EXAM UNDER ANESTHESIA ANUS N/A 2018    Procedure: EXAM UNDER ANESTHESIA ANUS;  Examination Under Anesthesia Anus, Local Excision of Perianal Melanoma;  Surgeon: Ronald Wright MD;  [facility]     EXAM UNDER ANESTHESIA, EXCISE LESION RECTUM, COMBINED N/A 2019    Procedure: Local Trans anal excision Of recurrent Anal Melanoma;  Surgeon: Ronald Wright MD;  [facility]     EXCISE LESION RECTUM N/A 2018    Procedure: EXCISE LESION RECTUM;;  Surgeon: Ronald Wright MD;  [facility]     PROCTOSCOPY N/A 2019    Procedure: Proctoscopy;  Surgeon: Ronald Wright MD;  [facility]       SOCIAL HISTORY  She has worked as a caregiver, currently works at assisted living facility. Lifelong nonsmoker. No significant alcohol. She has twins.      FAMILY HISTORY  Father had a skin cancer, unsure if melanoma. Paternal aunt had stomach cancer.    PHYSICAL EXAMINATION  /75   Pulse 96   Temp 98.7  F (37.1  C)   Resp 16   Ht 1.702 m (5' 7.01\")   Wt 81.8 kg (180 lb 6.4 oz)   SpO2 96%   BMI 28.25 kg/m    Wt Readings from Last 10 Encounters:   19 81.6 kg (180 lb)   19 81.8 kg (180 lb 6.4 oz)   19 84.8 kg (187 lb)   19 85.1 kg (187 lb 9.6 oz)   19 86 kg (189 lb 9.5 oz)   01/15/19 89.8 kg (198 lb)   19 89.9 kg (198 lb 4.8 oz)   19 90.2 kg (198 lb 14.4 oz)   18 93.9 kg (207 lb)   11/15/18 93.9 kg (207 lb)     Physical Exam   Constitutional: She is oriented to person, place, and time. She appears well-developed and well-nourished.   HENT:   Mouth/Throat: Oropharynx is clear and moist.   Eyes: EOM are normal. Pupils are equal, round, and reactive to light. No scleral icterus.   Neck: Normal range of motion. Neck supple.   Cardiovascular: Regular rhythm.   Pulmonary/Chest: Effort normal and breath sounds normal. She has no wheezes.   Abdominal: Soft. She exhibits no mass. "   Musculoskeletal: Normal range of motion. She exhibits no edema or tenderness.   Neurological: She is alert and oriented to person, place, and time. No cranial nerve deficit or sensory deficit.   There is verbal slowness and she appears to forget what she is saying mid speech at times.  Symmetric 4/5 weakness. No sensory changes.   Skin: Skin is warm and dry. No rash noted. No pallor.   Psychiatric: She has a normal mood and affect. Thought content normal.   Nursing note and vitals reviewed.      ASSESSMENT AND PLAN  #1 Recurrent metastatic melanoma to bowel,  lymph nodes, gall bladder, and lung  #2 Resected Stage IIIC nodular melanoma, pT4b pN2b, perianal primary  It was a pleasure to see Ms. Tamez today. She is a 63 year old woman with metastatic anal melanoma. She received her first cycle of nivolumab and ipilimumab on 2/27/19. She continues to have difficulty with neurologic symptoms that may be attributed to melanoma. Neurology is working her up for potential causes. While this is ongoing I have recommended a pause on therapy. I will hold her 2nd dose of treatment for next week. At that time we will review her clinical status, and make sure she has had no worsening of her neurologic symptoms prior to her 2nd dose of treatment.    She agrees to proceed. We will arrange and get scheduled. Will see her back next week Thursday. We will plan to stage her with PET-CT at same time she is imaged with tests ordered by neurology.    #3 Deconditioning  #4 Malnutrition  We will need to work to improve her strength and her nutrition for consideration of clinical trials down the line if Ipi/Nivo were to be ineffective. She may benefit from physical rehab and nutrition consultation. Will discuss on her return visit next week.    #5 Hypothyroidism  TSH is 0.22. We will continue to recheck, but she may benefit from a small dose reduction.    #6 Verbal and memory impairments  #7 Myoclonic jerking  I will see her back in 1  week. Patient already seen by neurologist, Dr. Malloy.      Multiple questions answered.    Nigel Person M.D.   of Medicine  Hematology, Oncology and Transplantation

## 2019-03-25 NOTE — PROGRESS NOTES
Per Patient's request,  completed and faxed [employer] [place] request for lodging dates 3/27-3/29. [place] will contact Patient for confirmation of reservation.  will continue to provide support as needed.    Soo Yeon Han, [facility] In[facility] HospitalSW  Pager:  [phone]

## 2019-03-27 NOTE — TELEPHONE ENCOUNTER
Health Call Center    Phone Message    May a detailed message be left on voicemail: yes    Reason for Call: Other: Bhavana calling from the [facility] in regards to some forms that were faxed over that need to be filled out in regards to the spinal tap order. Catherinela faxed them to the correct number, but is wondering if she needs to fax them again or if they had been received and where they are at in the process of being filled out. Please give Bhavana a call back at [phone] to discuss.      Action Taken: Message routed to:  [facility] ([facility] Neurology

## 2019-03-28 NOTE — LETTER
3/28/2019       RE: Khloe Tamez  [address]  [place] [address]     Dear Colleague,    Thank you for referring your patient, Khloe Tamez, to the [facility]. Please see a copy of my visit note below.    [facility]  MEDICAL ONCOLOGY PROGRESS NOTE  Mar 28, 2019     CHIEF COMPLAINT: Perianal melanoma     Melanoma History:.  1. 3/27/18 patient had perianal lesion biopsied in [place] ([id number]). This showed malignant melanoma, positive for HMB45 and S100, and negative for CD45 and pancytokeratin. Breslow depth at least 3.5 mm, non-ulcerated, with 5 mitoses/mm2, TILs present with deep and peripheral margins positive; pT3a.  2. 3/29/18, PET-CT showed a perianal FDG avid lesion (SUV max 10.8) and a superficial left inguinal lymph node measuring 0.9 cm (SUV max 4.1).  3. 4/13/18, she has wide local excision of a 2 x 2.5 cm deep perianal melanoma with anaplastic closure, under Dr. Wright. Pathology showed a nodular melanoma, Breslow depth 6.0 mm to mark's level IV, there were more than 10 mitoses per mm2, with ulceration, and lymphovascular invasion present. No perineural invasion or microsatellitosis. Tumor infiltrating lymphocytes present, non-brisk. AJCC microstage: pT4b.  4. 4/26/18, she has MRI brain, which is negative for metastatic disease.  5. 5/2/18, she returns to OR under Dr. Shah for left femoral sentinel lymph node biopsy, with 2 (of 3) involved lymph nodes. IHC positive for Melan-A, tyrosinase, and HMB-45. No definite extranodal extension.  6  5/31/18, she begins treatment with adjuvant nivolumab  7. 6/11/18, [employer] testing shows PD-L1 0% by IHC, with microsatellite stability (MARY LOU) and tumor mutational burden low at 4 per Mb DNA. Mutations seen in STK11 V4fs*160, BRAF D594G, SF3B1 R625H and KIT amplification.  8. 7/2/18, colonosocopy with diverticulosis and one sessile serrated adenoma and two tubular adenomas.  9. 11/15/18, CT-CAP showed no  evidence of metastatic disease.  10. 12/19/18, she has bloody loose stool and seen in ER with CT-scan showing a small region of circumferential wall thickening around the rectosigmoid junction.  11. 1/8/19, she sees Dr. Wright. He performed anoscopy, which showed a 2.5 cm lesion in the left posterior postiion with central depression. Biopsies taken (Specimen #: ) and pathology returns recurrent melanoma.  12. 1/15/19, MRI pelvis obtained, which showed lower rectal 4 cm enhancing lobulated mass, also involving the anal verge, consistent with biopsy-proven anal melanoma.  It abuts the  internal anal sphincter. There are also scattered adjacent perirectal lymph nodes some of them with suspicious morphology.  13. 1/24/19, she has PET-CT scan, which showed the 4 cm rectal mass, an FDG avid right inguinal lymph node, and a nodular focus of FDG uptake within the galllbadder. There is also a 5 mm solid nodule in the right lower lobe, which has increased from 3 mm on 1/24/19. Remainder of sub 1-cm nodules stable in appearance.  14. 1/25/19, she has transanal excision of recurrent anal melanoma, under Dr. Wright. Surgical pathology (Specimen #: [id number]) showed an ulcerated melanoma lesion to depth 7 mm without evidence of lymphovascular invasion. The lesion appeared completely resected.  15. 2/27/19, she receives her first dose of nivolumab plus ipilimumab.  16. MRI Brain showing several contrast enhancing lesions in her R cerebral hemisphere.      HISTORY OF PRESENT ILLNESS  Khloe Tamez is a 63 year old female from [place], [place] with resected perianal melanoma. She presents today following MRI of brain and PET scan. She is accompanied today by her .    PET-CT shows evidence of disease progression in lung and intra-abdominal implants. It also shows a new right adrenal gland lesion and intense FDG uptake over the entire span of the spinal cord, suspicious for leptomeningeal disease. MRI brain also shows  several small enhancing nodules suggestive of brain metastasis.      Patient says she has had two episodes of diarrhea in the last month. Her first episode was right after her infusion which lasted about 2-3 days and improved with imodium. Last week, she developed dysuria and urinary frequency. She was started on ciprofloxacin and subsequently developed diarrhea yesterday. She says she had 2 episodes of watery diarrhea which improved with imodium. Patient also complains of itchiness in her chest which she has had several times in the last month. She has tried benadryl which improved her itchiness. She thinks her myoclonic jerks have improved since treating her UTI. She still has trouble with short term memory. She says her long term memory has been fine.     ECOG performance status is 2.        REVIEW OF SYSTEMS  A 12-point ROS negative except as in HPI.     Current Outpatient Medications   Medication     acetaminophen (TYLENOL) 325 MG tablet     calcium carbonate (OS-JANAY 500 MG [place]. CA) 1250 MG tablet     DIPHENHYDRAMINE HCL PO     levothyroxine (SYNTHROID/LEVOTHROID) 125 MCG tablet     loperamide (IMODIUM) 2 MG capsule     LORazepam (ATIVAN) 0.5 MG tablet     prochlorperazine (COMPAZINE) 10 MG tablet     benzonatate (TESSALON) 100 MG capsule     ciprofloxacin (CIPRO) 500 MG tablet     guaiFENesin (MUCINEX) 600 MG 12 hr tablet     MAGNESIUM PO     oxyCODONE (ROXICODONE) 5 MG tablet     polyethylene glycol (MIRALAX/GLYCOLAX) packet     VITAMIN D, CHOLECALCIFEROL, PO     No current facility-administered medications for this visit.           Allergies   Allergen Reactions     Bactrim [Sulfamethoxazole W/Trimethoprim] Rash     Red bumps on skin with burning, burning in mouth, shakiness     Contrast Dye Rash     Pt. Stated rash on chest and itching after most recent CT scan with contrast.     Bee Venom Other (See Comments)     Rapid heart rate       Past Medical History:   Diagnosis Date     Melanoma malignant,  "perianal skin (H)      Motion sickness      Obesity (BMI 30.0-34.9)      Osteoarthritis      PONV (postoperative nausea and vomiting)        Past Surgical History:   Procedure Laterality Date     BIOPSY NODE SENTINEL Left 2018    Procedure: BIOPSY NODE SENTINEL;  [place] Lymph Node Biopsy x3, Lymphatic mapping;  Surgeon: Adams Shah MD;  Location: [facility] OR      SECTION       COLONOSCOPY       EXAM UNDER ANESTHESIA ANUS N/A 2018    Procedure: EXAM UNDER ANESTHESIA ANUS;  Examination Under Anesthesia Anus, Local Excision of Perianal Melanoma;  Surgeon: Ronald Wright MD;  Location: [facility] OR     EXAM UNDER ANESTHESIA, EXCISE LESION RECTUM, COMBINED N/A 2019    Procedure: Local Trans anal excision Of recurrent Anal Melanoma;  Surgeon: Ronald Wright MD;  Location: [facility] OR     EXCISE LESION RECTUM N/A 2018    Procedure: EXCISE LESION RECTUM;;  Surgeon: Ronald Wright MD;  Location: [facility]     PROCTOSCOPY N/A 2019    Procedure: Proctoscopy;  Surgeon: Ronald Wright MD;  Location: [facility] OR       SOCIAL HISTORY  She has worked as a caregiver, currently works at assisted living facility. Lifelong nonsmoker. No significant alcohol. She has twins.     FAMILY HISTORY  Father had a skin cancer, unsure if melanoma. Paternal aunt had stomach cancer.     PHYSICAL EXAMINATION  BP (!) 134/92   Pulse 106   Temp 99.5  F (37.5  C) (Oral)   Resp 16   Ht 1.702 m (5' 7\")   Wt 79.9 kg (176 lb 1 oz)   SpO2 96%   Breastfeeding? No   BMI 27.58 kg/m       Wt Readings from Last 5 Encounters:   19 79.9 kg (176 lb 1 oz)   19 81.6 kg (180 lb)   19 81.8 kg (180 lb 6.4 oz)   19 84.8 kg (187 lb)   19 85.1 kg (187 lb 9.6 oz)     Physical Exam   Constitutional: She is oriented to person, place, and time. She appears well-developed and well-nourished.   HENT:   Mouth/Throat: Oropharynx is clear and moist.   Eyes: EOM are normal. Pupils are equal, round, and reactive to light. No scleral " icterus.   Neck: Normal range of motion. Neck supple.   Cardiovascular:  Tachycardic. Regular rhythm.   Pulmonary/Chest: Effort normal and breath sounds normal. She has no wheezes.   Abdominal: Soft. She exhibits no mass.   Musculoskeletal: Normal range of motion. She exhibits no edema or tenderness.   Neurological: She is alert.  CN II - XII intact. 5/5 strength in bilateral upper and lower extremities. Sensation intact to light touch in bilateral upper and lower extremities. FNF intact. Rapid alternating movements intact. Intermittent myoclonic jerks. Intention tremor present in bilateral upper extremities.  Skin: Skin is warm and dry. No rash noted. No pallor.   Psychiatric: She has a normal mood and affect. Thought content normal.      Imaging  MR BRAIN W/O & W CONTRAST 3/28/2019  Impression  1. Subtle punctate foci over the right cerebral hemisphere are  suspicious for intracranial metastatic disease. Largest lesion  measures up to 4 mm in diameter.  2. Unchanged right frontal calvarial lesion likely represents a venous  lake.    MR CERVICAL SPINE W/O & W CONTRAST 3/28/2019   Impression:  1. Multilevel cervical spondylosis most pronounced at C4-C7 with mild  neural foraminal stenosis bilaterally. Mild spinal canal stenosis at  C4-5.  2. No abnormal enhancement.    PET-CT 3/28/2019  1. Continued increased size of a right lower lobe nodule series 9  image 118) and. This nodule is too small to catheterize with FDG PET.  2. Increased size, number and metabolic activity of metastatic foci  within the gallbladder.  3. Multiple new FDG avid presumed intra-abdominal implants.  4. Increased size/metabolic activity of right inguinal  lymphadenopathy.  5. New FDG avid right adrenal gland lesion.  6. Intense FDG uptake throughout the entire course of spinal cord,  suspicious for leptomeningeal disease.   7. New intense FDG uptake in the pituitary gland could represent  hypophysitis  in the setting of immune check point  inhibitor usage  versus pituitary metastases.    ASSESSMENT AND PLAN  #1 Recurrent metastatic melanoma to bowel,  lymph nodes, gall bladder, lung, and now brain  #2 Resected Stage IIIC nodular melanoma, pT4b pN2b, perianal primary  It was a pleasure to see Ms. Tamez today. She is a 63 year old woman with metastatic anal melanoma. She received her first cycle of nivolumab and ipilimumab on 2/27/19. Her second cycle was held on 3/21/19 as neurology is working her up with her neurologic symptoms. She had an MRI of the brain performed which showed several small contrast enhancing lesions in the R cerebral hemisphere, concerning for metastases. She also has significant FDG uptake throughout the course of the spinal cord, suspicious for leptomeningeal disease.    She had a non focal neurologic exam today. With her recent dose of nivolumab and ipilimumab, it is possible these enhancing lesions and FDG uptake throughout the spinal course represent pseudo-progression from T cells infiltrating tumor. This could explain why she had no evidence of brain mets in her brain MRI on 2/11/19 which was prior to her first cycle of immunotherapy. Alternatively, these lesions could also represent disease progression of her melanoma, and it will be some time still before this would become more clear radiologically.    These findings were discussed with the patient and her . We discussed several options moving forward including continuing with nivolumab and ipilimumab vs radiation oncology consultation regarding whole brain radiation or craniospinal radiation. We also discussed the possibility of stopping treatment to focus on symptom management. This is a complicated situation given neurologic impairments are already being investigated by neurology and treatment is likely to be associated with additional fatigue and radiation toxicities.    Patient and her  would like to discuss with the rest of their family before they  make a decision on next steps. We will continue to hold off on her second cycle of nivolumab and ipilimumab while she decides on next steps and goals of care. We have asked she call us with next steps as soon as they are ready.    #3 Diarrhea  Patient had an episode of diarrhea following her most recent immunotherapy infusion. She has also recently been treated with ciprofloxacin for a UTI by her PCP. Unclear whether her diarrhea is due to immunotherapy vs other infectious causes. In the differential is C Diff infection as she has had several courses of antibiotics for UTI recently. Patient was instructed to stop taking imodium for the time being. Were she to have worsening symptoms off of Immodium we will order C Diff and enteric pathogen panel testing. Testing could be obtained locally in [place] if necessary.     #4 Hypothyroidism  TSH is 0.22  with elevated T4 of 1.60. Her levothyroxine dose was reduced to 125 mcg on 3/21/19.     #5 Verbal and memory impairments  #6 Myoclonic jerking  She is currently seeing her neurologist, Dr. Malloy. Her recent LP showed non specific findings including a glucose of 45 and protein of 104. Her brain lesions on MRI are quite small and would not explain her myoclonic jerks and memory impairments. Paraneoplastic testing is still pending. For now will continue to monitor.      Scribed by Percy Hawley, MS4 for Dr. Raza Person M.D.   of Medicine  Hematology, Oncology and Transplantation

## 2019-03-28 NOTE — NURSING NOTE
"Oncology Rooming Note    March 28, 2019 12:20 PM   Khloe Tamez is a 63 year old female who presents for:    Chief Complaint   Patient presents with     Oncology Clinic Visit     Return: Melanoma     Initial Vitals: BP (!) 134/92   Pulse 106   Temp 99.5  F (37.5  C) (Oral)   Resp 16   Ht 1.702 m (5' 7\")   Wt 79.9 kg (176 lb 1 oz)   SpO2 96%   Breastfeeding? No   BMI 27.58 kg/m   Estimated body mass index is 27.58 kg/m  as calculated from the following:    Height as of this encounter: 1.702 m (5' 7\").    Weight as of this encounter: 79.9 kg (176 lb 1 oz). Body surface area is 1.94 meters squared.  No Pain (0) Comment: Data Unavailable   No LMP recorded. Patient is postmenopausal.  Allergies reviewed: Yes  Medications reviewed: Yes    Medications: Medication refills not needed today.  Pharmacy name entered into [facility]:    [id number] - [facility] [address]  [facility] [id number] [place] [facility] [address] [place] [address] [address]    Clinical concerns: new concerns today are that the twitching is not as bad since she finished the cipro.   Her shakes seam to be compounded the more tired that she gets. Dr. Murphy Valentine was notified.      Aubrey Eduardo [facility]              "

## 2019-03-28 NOTE — PROGRESS NOTES
[facility]  MEDICAL ONCOLOGY PROGRESS NOTE  Mar 28, 2019     CHIEF COMPLAINT: Perianal melanoma     Melanoma History:.  1. 3/27/18 patient had perianal lesion biopsied in [place] ([id number]). This showed malignant melanoma, positive for HMB45 and S100, and negative for CD45 and pancytokeratin. Breslow depth at least 3.5 mm, non-ulcerated, with 5 mitoses/mm2, TILs present with deep and peripheral margins positive; pT3a.  2. 3/29/18, PET-CT showed a perianal FDG avid lesion (SUV max 10.8) and a superficial left inguinal lymph node measuring 0.9 cm (SUV max 4.1).  3. 4/13/18, she has wide local excision of a 2 x 2.5 cm deep perianal melanoma with anaplastic closure, under Dr. Wright. Pathology showed a nodular melanoma, Breslow depth 6.0 mm to mark's level IV, there were more than 10 mitoses per mm2, with ulceration, and lymphovascular invasion present. No perineural invasion or microsatellitosis. Tumor infiltrating lymphocytes present, non-brisk. AJCC microstage: pT4b.  4. 4/26/18, she has MRI brain, which is negative for metastatic disease.  5. 5/2/18, she returns to OR under Dr. Shah for left femoral sentinel lymph node biopsy, with 2 (of 3) involved lymph nodes. IHC positive for Melan-A, tyrosinase, and HMB-45. No definite extranodal extension.  6  5/31/18, she begins treatment with adjuvant nivolumab  7. 6/11/18, [facility] testing shows PD-L1 0% by IHC, with microsatellite stability (MARY LOU) and tumor mutational burden low at 4 per Mb DNA. Mutations seen in STK11 V4fs*160, BRAF D594G, SF3B1 R625H and KIT amplification.  8. 7/2/18, colonosocopy with diverticulosis and one sessile serrated adenoma and two tubular adenomas.  9. 11/15/18, CT-CAP showed no evidence of metastatic disease.  10. 12/19/18, she has bloody loose stool and seen in ER with CT-scan showing a small region of circumferential wall thickening around the rectosigmoid junction.  11. 1/8/19, she sees Dr. Wright. He performed  anoscopy, which showed a 2.5 cm lesion in the left posterior postiion with central depression. Biopsies taken (Specimen #: ) and pathology returns recurrent melanoma.  12. 1/15/19, MRI pelvis obtained, which showed lower rectal 4 cm enhancing lobulated mass, also involving the anal verge, consistent with biopsy-proven anal melanoma.  It abuts the  internal anal sphincter. There are also scattered adjacent perirectal lymph nodes some of them with suspicious morphology.  13. 1/24/19, she has PET-CT scan, which showed the 4 cm rectal mass, an FDG avid right inguinal lymph node, and a nodular focus of FDG uptake within the galllbadder. There is also a 5 mm solid nodule in the right lower lobe, which has increased from 3 mm on 1/24/19. Remainder of sub 1-cm nodules stable in appearance.  14. 1/25/19, she has transanal excision of recurrent anal melanoma, under Dr. Wright. Surgical pathology (Specimen #: [id number]) showed an ulcerated melanoma lesion to depth 7 mm without evidence of lymphovascular invasion. The lesion appeared completely resected.  15. 2/27/19, she receives her first dose of nivolumab plus ipilimumab.  16. MRI Brain showing several contrast enhancing lesions in her R cerebral hemisphere.      HISTORY OF PRESENT ILLNESS  Khloe Tamez is a 63 year old female from [place], [place] with resected perianal melanoma. She presents today following MRI of brain and PET scan. She is accompanied today by her .    PET-CT shows evidence of disease progression in lung and intra-abdominal implants. It also shows a new right adrenal gland lesion and intense FDG uptake over the entire span of the spinal cord, suspicious for leptomeningeal disease. MRI brain also shows several small enhancing nodules suggestive of brain metastasis.      Patient says she has had two episodes of diarrhea in the last month. Her first episode was right after her infusion which lasted about 2-3 days and improved with imodium.  Last week, she developed dysuria and urinary frequency. She was started on ciprofloxacin and subsequently developed diarrhea yesterday. She says she had 2 episodes of watery diarrhea which improved with imodium. Patient also complains of itchiness in her chest which she has had several times in the last month. She has tried benadryl which improved her itchiness. She thinks her myoclonic jerks have improved since treating her UTI. She still has trouble with short term memory. She says her long term memory has been fine.     ECOG performance status is 2.        REVIEW OF SYSTEMS  A 12-point ROS negative except as in HPI.     Current Outpatient Medications   Medication     acetaminophen (TYLENOL) 325 MG tablet     calcium carbonate (OS-JANAY 500 MG [place]. [place]) 1250 MG tablet     DIPHENHYDRAMINE HCL PO     levothyroxine (SYNTHROID/LEVOTHROID) 125 MCG tablet     loperamide (IMODIUM) 2 MG capsule     LORazepam (ATIVAN) 0.5 MG tablet     prochlorperazine (COMPAZINE) 10 MG tablet     benzonatate (TESSALON) 100 MG capsule     ciprofloxacin (CIPRO) 500 MG tablet     guaiFENesin (MUCINEX) 600 MG 12 hr tablet     MAGNESIUM PO     oxyCODONE (ROXICODONE) 5 MG tablet     polyethylene glycol (MIRALAX/GLYCOLAX) packet     VITAMIN D, CHOLECALCIFEROL, PO     No current facility-administered medications for this visit.           Allergies   Allergen Reactions     Bactrim [Sulfamethoxazole W/Trimethoprim] Rash     Red bumps on skin with burning, burning in mouth, shakiness     Contrast Dye Rash     Pt. Stated rash on chest and itching after most recent CT scan with contrast.     Bee Venom Other (See Comments)     Rapid heart rate       Past Medical History:   Diagnosis Date     Melanoma malignant, perianal skin (H)      Motion sickness      Obesity (BMI 30.0-34.9)      Osteoarthritis      PONV (postoperative nausea and vomiting)        Past Surgical History:   Procedure Laterality Date     BIOPSY NODE SENTINEL Left 5/2/2018    Procedure:  "BIOPSY NODE SENTINEL;  [place] Lymph Node Biopsy x3, Lymphatic mapping;  Surgeon: Adams Shah MD;  Location: [facility] OR      SECTION       COLONOSCOPY       EXAM UNDER ANESTHESIA ANUS N/A 2018    Procedure: EXAM UNDER ANESTHESIA ANUS;  Examination Under Anesthesia Anus, Local Excision of Perianal Melanoma;  Surgeon: Ronald Wright MD;  Location: [facility] OR     EXAM UNDER ANESTHESIA, EXCISE LESION RECTUM, COMBINED N/A 2019    Procedure: Local Trans anal excision Of recurrent Anal Melanoma;  Surgeon: Ronald Wright MD;  Location: [facility] OR     EXCISE LESION RECTUM N/A 2018    Procedure: EXCISE LESION RECTUM;;  Surgeon: Ronald Wright MD;  Location: [facility] OR     PROCTOSCOPY N/A 2019    Procedure: Proctoscopy;  Surgeon: Ronald Wright MD;  Location: [facility] OR       SOCIAL HISTORY  She has worked as a caregiver, currently works at assisted living facility. Lifelong nonsmoker. No significant alcohol. She has twins.     FAMILY HISTORY  Father had a skin cancer, unsure if melanoma. Paternal aunt had stomach cancer.     PHYSICAL EXAMINATION  BP (!) 134/92   Pulse 106   Temp 99.5  F (37.5  C) (Oral)   Resp 16   Ht 1.702 m (5' 7\")   Wt 79.9 kg (176 lb 1 oz)   SpO2 96%   Breastfeeding? No   BMI 27.58 kg/m      Wt Readings from Last 5 Encounters:   19 79.9 kg (176 lb 1 oz)   19 81.6 kg (180 lb)   19 81.8 kg (180 lb 6.4 oz)   19 84.8 kg (187 lb)   19 85.1 kg (187 lb 9.6 oz)     Physical Exam   Constitutional: She is oriented to person, place, and time. She appears well-developed and well-nourished.   HENT:   Mouth/Throat: Oropharynx is clear and moist.   Eyes: EOM are normal. Pupils are equal, round, and reactive to light. No scleral icterus.   Neck: Normal range of motion. Neck supple.   Cardiovascular: Tachycardic. Regular rhythm.   Pulmonary/Chest: Effort normal and breath sounds normal. She has no wheezes.   Abdominal: Soft. She exhibits no mass.   Musculoskeletal: Normal " range of motion. She exhibits no edema or tenderness.   Neurological: She is alert. CN II - XII intact. 5/5 strength in bilateral upper and lower extremities. Sensation intact to light touch in bilateral upper and lower extremities. FNF intact. Rapid alternating movements intact. Intermittent myoclonic jerks. Intention tremor present in bilateral upper extremities.  Skin: Skin is warm and dry. No rash noted. No pallor.   Psychiatric: She has a normal mood and affect. Thought content normal.      Imaging  MR BRAIN W/O & W CONTRAST 3/28/2019  Impression  1. Subtle punctate foci over the right cerebral hemisphere are  suspicious for intracranial metastatic disease. Largest lesion  measures up to 4 mm in diameter.  2. Unchanged right frontal calvarial lesion likely represents a venous  lake.    MR CERVICAL SPINE W/O & W CONTRAST 3/28/2019   Impression:  1. Multilevel cervical spondylosis most pronounced at C4-C7 with mild  neural foraminal stenosis bilaterally. Mild spinal canal stenosis at  C4-5.  2. No abnormal enhancement.    PET-CT 3/28/2019  1. Continued increased size of a right lower lobe nodule series 9  image 118) and. This nodule is too small to catheterize with FDG PET.  2. Increased size, number and metabolic activity of metastatic foci  within the gallbladder.  3. Multiple new FDG avid presumed intra-abdominal implants.  4. Increased size/metabolic activity of right inguinal  lymphadenopathy.  5. New FDG avid right adrenal gland lesion.  6. Intense FDG uptake throughout the entire course of spinal cord,  suspicious for leptomeningeal disease.   7. New intense FDG uptake in the pituitary gland could represent  hypophysitis  in the setting of immune check point inhibitor usage  versus pituitary metastases.    ASSESSMENT AND PLAN  #1 Recurrent metastatic melanoma to bowel,  lymph nodes, gall bladder, lung, and now brain  #2 Resected Stage IIIC nodular melanoma, pT4b pN2b, perianal primary  It was a pleasure to  see MsKayla Dashawn today. She is a 63 year old woman with metastatic anal melanoma. She received her first cycle of nivolumab and ipilimumab on 2/27/19. Her second cycle was held on 3/21/19 as neurology is working her up with her neurologic symptoms. She had an MRI of the brain performed which showed several small contrast enhancing lesions in the R cerebral hemisphere, concerning for metastases. She also has significant FDG uptake throughout the course of the spinal cord, suspicious for leptomeningeal disease.    She had a non focal neurologic exam today. With her recent dose of nivolumab and ipilimumab, it is possible these enhancing lesions and FDG uptake throughout the spinal course represent pseudo-progression from T cells infiltrating tumor. This could explain why she had no evidence of brain mets in her brain MRI on 2/11/19 which was prior to her first cycle of immunotherapy. Alternatively, these lesions could also represent disease progression of her melanoma, and it will be some time still before this would become more clear radiologically.    These findings were discussed with the patient and her . We discussed several options moving forward including continuing with nivolumab and ipilimumab vs radiation oncology consultation regarding whole brain radiation or craniospinal radiation. We also discussed the possibility of stopping treatment to focus on symptom management. This is a complicated situation given neurologic impairments are already being investigated by neurology and treatment is likely to be associated with additional fatigue and radiation toxicities.    Patient and her  would like to discuss with the rest of their family before they make a decision on next steps. We will continue to hold off on her second cycle of nivolumab and ipilimumab while she decides on next steps and goals of care. We have asked she call us with next steps as soon as they are ready.    #3 Diarrhea  Patient  had an episode of diarrhea following her most recent immunotherapy infusion. She has also recently been treated with ciprofloxacin for a UTI by her PCP. Unclear whether her diarrhea is due to immunotherapy vs other infectious causes. In the differential is C Diff infection as she has had several courses of antibiotics for UTI recently. Patient was instructed to stop taking imodium for the time being. Were she to have worsening symptoms off of Immodium we will order C Diff and enteric pathogen panel testing. Testing could be obtained locally in [place] if necessary.     #4 Hypothyroidism  TSH is 0.22 with elevated T4 of 1.60. Her levothyroxine dose was reduced to 125 mcg on 3/21/19.     #5 Verbal and memory impairments  #6 Myoclonic jerking  She is currently seeing her neurologist, Dr. Malloy. Her recent LP showed non specific findings including a glucose of 45 and protein of 104. Her brain lesions on MRI are quite small and would not explain her myoclonic jerks and memory impairments. Paraneoplastic testing is still pending. For now will continue to monitor.      Scribed by Percy Hawley, MS4 for Dr. Raza Person M.D.   of Medicine  Hematology, Oncology and Transplantation

## 2019-03-29 NOTE — PROGRESS NOTES
[facility] [id number]  OP[facility]3hr Video EEG  [facility] [place] [facility] [place]  Dr.Patel pan

## 2019-04-01 NOTE — PROGRESS NOTES
Pt called triage and reported loose stools off Imodium. Per Dr. Murphy Valentine, if diarrhea continues to check c-diff and enteric panel. Pt wants to go to [place] for testing. Orders faxed to [phone], confirmed as received via [employer] Fax.

## 2019-04-02 NOTE — PROCEDURES
Procedure Date: 03/29/2019      EEG #:  [id number].   This is a 3-hour EEG on 03/29/2019.      SOURCE FILE DURATION:  Three hours.      PATIENT INFORMATION:  The patient is a 63-year-old female who presents with abnormal movements and worsening cognition.  EEG is being done to evaluate for seizures.  The patient did receive Ativan in the morning.      TECHNICAL SUMMARY: This video EEG monitoring procedure was performed with 23 scalp electrodes in 10-20 system placements, and additional scalp, precordial and other surface electrodes used for electrical referencing and artifact detection. Video was reviewed intermittently by EEG technologist and physician for electroclinical seizures.     BACKGROUND:  In the fully awake state, the patient has an 8-9 Hz parieto-occipital rhythm that is symmetric and reactive, well-modulated.  There is a lot of movement artifact throughout the entire recording.  In the fully awake state, the patient does have high voltage generalized delta slowing that is 2-4 Hz in frequency.  This is seen throughout the entire record.  She is awake for the entire record.      ACTIVATION PROCEDURES:  Photic stimulation was completed with no significant abnormalities identified. Hyperventilation was not done due to patient's medical history.      EPILEPTIFORM DISCHARGES:  None.      EVENTS:  The patient has multiple body jerks throughout the recording and tremoring.  Sometimes there were leg jerks also.  This was seen persistently throughout the entire recording when patient is awake.  The patient does get drowsy towards the end of the record.  She reaches stage I.  Stage II sleep is not achieved.  The patient does state that she continues to have these leg movements through sleep, but we again did not record stage II sleep.  These jerks were not correlated with video EEG changes to suggest these are seizures.      IMPRESSION:  The 3-hour video EEG is abnormal due to the presence of intermittent  generalized delta slowing consistent with a mild encephalopathy.  Multiple episodes of whole body jerks or tremors were recorded with no EEG correlation to suggest these are seizures.  No epileptiform discharges or electrographic seizures are seen.  Clinical correlation is advised.         ELLE MCNEIL MD             D: 2019   T: 2019   MT: lindy      Name:     ROXANNA ASTUDILLO   MRN:      [id number]        Account:        [id number]   :      1955           Procedure Date: 2019      Document: [id number]

## 2019-04-04 NOTE — PROGRESS NOTES
Patient was at clinic today for other appointment and  call this RN to advise us that patient was having trouble urinating, but sometimes felt that her bladder was full.   Labs and UA/UC ordered.  Bladder scan done which showed an empty bladder  Creatinine normal, UA/UC revealed infection.  Results reviewed with Meka Payne PA-C.  Cipro 500 BID x 5 days ordered.  Patient notified and Rx sent to pharmacy of choice.    Linda Sanchez MSN, RN, OCN  RN Care Coordinator  [facility]  [phone]

## 2019-04-04 NOTE — NURSING NOTE
Chief Complaint   Patient presents with     Blood Draw     Labs drawn via  by RN in lab.      Judy Oliveira RN

## 2019-04-04 NOTE — LETTER
4/4/2019       RE: Khloe Tamez  [address]  [place] [address]     Dear Colleague,    Thank you for referring your patient, Khloe Tamez, to the [facility] EMG at [facility]. Please see a copy of my visit note below.      [facility]  Electrodiagnostic Laboratory    Nerve Conduction & EMG Report    Patient:       Khloe Tamez  Patient ID:    [id number]  Gender:        Female  YOB: 1955  Age:           63 Years 3 Months      History & Examination: This is a 63-year-old female referred by Dr. Malloy for evaluation for nerve hyperexcitability.    Techniques:  Sensory and Motor conduction studies were done with surface recording electrodes. EMG was done with a concentric needle electrode.     Results: Median ulnar and sural sensory nerve action potentials were normal.  Median, peroneal and tibial motor conduction studies were normal.  No after discharges were seen.  On needle exam several muscles had abundant fasciculation.  No neuromyotonia or myokymia was seen.  No fibrillation was recorded.  Motor unit potentials appeared normal.    Interpretation: The EMG is abnormal.  Fasciculation is seen in multiple muscles.  This can be an indicator of nerve hyperexcitability.  No neuromyotonia or myokymia was seen.    EMG Physician: Bebo Groves MD    Sensory NCS      Nerve / Sites Rec. Site Onset Peak NP Amp Ref. PP Amp Dist Will Ref. Temp     ms ms  V  V  V cm m/s m/s  C   R MEDIAN - Dig II Anti      Wrist Dig II 2.66 3.49 32.3 10.0 45.3 14 52.7 48.0 33.3   R ULNAR - Dig V Anti      Wrist Dig V 2.45 3.28 28.2 8.0 44.2 12.5 51.1 48.0 33.4   R SURAL - Lat Mall 60      Calf Ankle 3.18 4.22 12.2 5.0 15.0 14 44.1 38.0 31.1       Motor NCS      Nerve / Sites Rec. Site Lat Ref. Amp Ref. Rel Amp Dist Will Ref. Dur. Area Temp.     ms ms mV mV % cm m/s m/s ms %  C   R MEDIAN - APB      Wrist APB 3.02 4.40 13.4 5.0 100 8   6.09 100 33.4      Elbow APB  6.88  11.6  86.1 23 59.7 48.0 6.30 86.8 33.4   R ULNAR - ADM      Wrist ADM 2.50 3.50 11.1 5.0 100 8   6.82 100 32.3      B.Elbow ADM 5.52  11.2  100 19 62.9 48.0 6.93 98.7 32.3      A.Elbow ADM 7.19  11.2  101 10.5 63.0 48.0 6.98 98 32.2   R DEEP PERONEAL - EDB 60      Ankle EDB 4.38 6.00 2.8 2.0 100 8   5.36 100 31      FibHead EDB 11.30  2.2  76.3 30 43.3 38.0 6.04 75.7 31.1      Pop Fos EDB 13.28  2.0  71.7 9.5 48.0 38.0 5.31 57 31.1   R TIBIAL - AH      Ankle AH 4.17 6.00 12.7 4.0 100 8   5.99 100 31.1      Pop Fos AH 12.97  1.7  13.5 40.5 46.0 38.0 6.56 31.2 31.1       Needle EMG (W)      EMG Summary Table     Spontaneous MUAP Recruitment    IA Fib/PSW Fasc H.F. Amp Dur. PPP Pattern   R. TIB ANTERIOR N None 2+ None N N N N   R. VAST LATERALIS N None None None N N N N   R. FIRST D INTEROSS N None 2+ None N N N N   R. BICEPS N None 1+ None N N N N   R. TRAPEZIUS (U) N None None None N N N N                      Again, thank you for allowing me to participate in the care of your patient.      Sincerely,    Bebo Groves MD

## 2019-04-04 NOTE — PROGRESS NOTES
[facility]  Electrodiagnostic Laboratory    Nerve Conduction & EMG Report          Patient:       Khloe Tamez  Patient ID:    [id number]  Gender:        Female  YOB: 1955  Age:           63 Years 3 Months      History & Examination: This is a 63-year-old female referred by Dr. Malloy for evaluation for nerve hyperexcitability.      Techniques:  Sensory and Motor conduction studies were done with surface recording electrodes. EMG was done with a concentric needle electrode.     Results: Median ulnar and sural sensory nerve action potentials were normal.  Median, peroneal and tibial motor conduction studies were normal.  No after discharges were seen.  On needle exam several muscles had abundant fasciculation.  No neuromyotonia or myokymia was seen.  No fibrillation was recorded.  Motor unit potentials appeared normal.    Interpretation: The EMG is abnormal.  Fasciculation is seen in multiple muscles.  This can be an indicator of nerve hyperexcitability.  No neuromyotonia or myokymia was seen.    EMG Physician: Bebo Groves MD            Sensory NCS      Nerve / Sites Rec. Site Onset Peak NP Amp Ref. PP Amp Dist Wlil Ref. Temp     ms ms  V  V  V cm m/s m/s  C   R MEDIAN - Dig II Anti      Wrist Dig II 2.66 3.49 32.3 10.0 45.3 14 52.7 48.0 33.3   R ULNAR - Dig V Anti      Wrist Dig V 2.45 3.28 28.2 8.0 44.2 12.5 51.1 48.0 33.4   R SURAL - Lat Mall 60      Calf Ankle 3.18 4.22 12.2 5.0 15.0 14 44.1 38.0 31.1       Motor NCS      Nerve / Sites Rec. Site Lat Ref. Amp Ref. Rel Amp Dist Will Ref. Dur. Area Temp.     ms ms mV mV % cm m/s m/s ms %  C   R MEDIAN - APB      Wrist APB 3.02 4.40 13.4 5.0 100 8   6.09 100 33.4      Elbow APB 6.88  11.6  86.1 23 59.7 48.0 6.30 86.8 33.4   R ULNAR - ADM      Wrist ADM 2.50 3.50 11.1 5.0 100 8   6.82 100 32.3      B.Elbow ADM 5.52  11.2  100 19 62.9 48.0 6.93 98.7 32.3      A.Elbow ADM 7.19  11.2  101 10.5 63.0 48.0 6.98 98 32.2   R DEEP PERONEAL  - EDB 60      Ankle EDB 4.38 6.00 2.8 2.0 100 8   5.36 100 31      FibHead EDB 11.30  2.2  76.3 30 43.3 38.0 6.04 75.7 31.1      Pop Fos EDB 13.28  2.0  71.7 9.5 48.0 38.0 5.31 57 31.1   R TIBIAL - AH      Ankle AH 4.17 6.00 12.7 4.0 100 8   5.99 100 31.1      Pop Fos AH 12.97  1.7  13.5 40.5 46.0 38.0 6.56 31.2 31.1       Needle EMG (W)      EMG Summary Table     Spontaneous MUAP Recruitment    IA Fib/PSW Fasc H.F. Amp Dur. PPP Pattern   R. TIB ANTERIOR N None 2+ None N N N N   R. VAST LATERALIS N None None None N N N N   R. FIRST D INTEROSS N None 2+ None N N N N   R. BICEPS N None 1+ None N N N N   R. TRAPEZIUS (U) N None None None N N N N

## 2019-04-05 NOTE — TELEPHONE ENCOUNTER
Date of appointment: 4/10/19   Diagnosis/reason for appointment:Cons-Melanoma of anus-  Referring provider/facility:Ref Dr. Valentine  Who called:Pool message    Recent Studies  Imaging:  Pathology:  Labs:  Previous radiation (if known):No    Records in Epic/CE    Additional information:

## 2019-04-10 NOTE — PROGRESS NOTES
DEPARTMENT OF NEUROLOGY  Patient Name: Khloe Tamez  MRN: [id number]  : 1955  Date of Clinic Visit: April 10, 2019  Primary Care Provider: Aide Robertson    FOLLOW-UP FOR: cognitive decline    INTERVAL HISTORY:  Khloe Tamez is a 63 year old female presenting for follow-up for cognitive decline. I last saw her with her fmaily about 4 weeks ago. We ran a slew of tests including MRI Brain and C-spine, EEG, EMG, CSF, and blood tests. CSF demonstrated elevated protein and reduced glucose, unfortunately do not have cell count and differential results. CSF and blood paraneoplastic panels (PAVAL1 and PAC1) were negative. RT-QUiC was not sent but does not need to be resent. EEG did not have any evidence for seizures or irritated brain areas; it did capture some abnormal limb movements and there was no electrographic correlate. EMG was normal but there were fasciculations observed. Blood vitamin levels were normal. MRI was the most revealing, demonstrating enhancing 4 punctate foci in the cerebral hemispheres that are suspicious for new mets. This would also explain the elevated protein count.   Since last seen, symptoms have been generally stable with perhaps some worsening in the last two days. She's experienced auditory and visual hallucinations over the past 2 days but is aware they're hallucinations. She is not bothered by them. They can occur during the day or evening. Her confusion persists as well, though no more speech troubles like her daughter previously mentioned.   She also hasn't been sleeping very well. She wakes up multiple times throughout the night and has trouble returning to sleep. Then during the day she gets fatigued extremely easy and takes a nap for a few hours.   She saw her Oncologist team today. They've been holding her chemotherapy because they were concerned that her symptoms have been getting worse because of it. Family estimates last dose about 6 weeks ago. Since it was  "stopped, she has not improved and has probably gotten worse.    ASSESSMENT AND PLAN:  63F presenting with family with concerns about confusion. MRI had evidence of brain mets and Iuspect these are causing neuronal dysfunction from focal irritation. With the negative paraneoplastic panel, suspect this elevated protein is secondary to brain mets with blood-brain barrier breakdown.   Low suspicion for intracranial infectoin as she'd be much sicker if it went untreated this long. Her symptoms are unlikely to be caused by chemotherapy as she's had continued symptoms despite no chemo for > 4 weeks. With the EEG lacking electrographic correlate for her abnormal movements, we suspect these are not seizures and perhaps are peripheral in nature. It's possible that some of her cognitive difficulties are related to neuronal irritation from the putative mets, and she might therefore benefit from a medication like Keppra, intended to \"settle down\" the brain. If this does not improve her cognition over the next 4 weeks, ok to stop. Discussed side-effects with her and her family.    Plan:  - Keppra 500mg BID to stabilize neuronal activity  - referral to Dr. Dorsey  - continue chemotherapy per oncology team    Patient was seen and discussed with Dr. Christianson.    Javy Malloy MD  Neurology PGY4  [facility]      This is a supervisory note for Dr Mallyo. I have met the patient and confirmed history and pertinent examination findings. I agree with the plan to Keppra and referral to Dr. Dorsey for her expertise in this arena.  Meka Christianson MD FAAN  Department of Neurology  Pager [phone]        PHYSICAL EXAMINATION:  Vitals: /74   Pulse 111   Temp 98.4  F (36.9  C) (Oral)   Resp 16   Ht 1.702 m (5' 7\")   Wt 79.8 kg (176 lb)   SpO2 96%   BMI 27.57 kg/m    General: sitting in wheelchair, , daughter, and son are present  HEENT: normocephalic  Neuro: quiet, soft-spoken, reduced prosody in voice; no " facial asymmetry, conjugate gaze, no gaze deviation, hearing intact to conversation, moderately hypophonia, no dysarthria; no tremor or myoclonus observed, moves all limbs spontaneously, did not formally test strength, reflexes, sensation, cerebellum, or gait.    INVESTIGATIONS:  MRI Brain WOW 3/28/19:  Impression:  1. Subtle punctate foci over the right cerebral hemisphere are suspicious for intracranial metastatic disease. Largest lesion measures up to 4 mm in diameter.  2. Unchanged right frontal calvarial lesion likely represents a venous lake.    MRI Cervical Spine WOW 3/28/19:  Impression:  1. Multilevel cervical spondylosis most pronounced at C4-C7 with mild neural foraminal stenosis bilaterally. Mild spinal canal stenosis at C4-5.  2. No abnormal enhancement.    Lumbar Puncture: opening pressure < [id number]  CSF protein 104  CSF glucose 45    [place] PAC1: negative  [place] PAVAL1: negative    B12 = 685  MMA = 0.24    EEG 4/1/19:  IMPRESSION:  The 3-hour video EEG is abnormal due to the presence of intermittent generalized delta slowing consistent with a mild encephalopathy.  Multiple episodes of whole body jerks or tremors were recorded with no EEG correlation to suggest these are seizures.  No epileptiform discharges or electrographic seizures are seen.  Clinical correlation is advised.     EMG 4/4/19:  Interpretation: The EMG is abnormal. Fasciculation is seen in multiple muscles. This can be an indicator of nerve hyperexcitability. No neuromyotonia or myokymia was seen.      REVIEW OF SYSTEMS: 12-point RoS negative except as per HPI.    MEDICATIONS:  Current Outpatient Medications   Medication Sig Dispense Refill     benzonatate (TESSALON) 100 MG capsule TAKE ONE CAPSULE BY MOUTH TWICE A DAY AS NEEDED FOR COUGH  3     calcium carbonate (OS-JANAY 500 MG [place]. [place]) 1250 MG tablet Take 1 tablet by mouth every evening        ciprofloxacin (CIPRO) 500 MG tablet        DIPHENHYDRAMINE HCL PO Take 25 mg by mouth nightly  as needed       guaiFENesin (MUCINEX) 600 MG 12 hr tablet Take 1,200 mg by mouth 2 times daily as needed        levETIRAcetam (KEPPRA) 500 MG tablet Take 1 tablet (500 mg) by mouth 2 times daily 60 tablet 11     levothyroxine (SYNTHROID/LEVOTHROID) 125 MCG tablet Take 1 tablet (125 mcg) by mouth daily 30 tablet 1     loperamide (IMODIUM) 2 MG capsule Take 2 mg by mouth 4 times daily as needed for diarrhea       MAGNESIUM PO Take by mouth At Bedtime       oxyCODONE (ROXICODONE) 5 MG tablet        polyethylene glycol (MIRALAX/GLYCOLAX) packet Take 1 packet by mouth daily       VITAMIN D, CHOLECALCIFEROL, PO Take by mouth every evening        LORazepam (ATIVAN) 0.5 MG tablet Take 1 tablet (0.5 mg) by mouth every 4 hours as needed (Anxiety, Nausea/Vomiting or Sleep) (Patient not taking: Reported on 4/10/2019) 30 tablet 3     prochlorperazine (COMPAZINE) 10 MG tablet Take 1 tablet (10 mg) by mouth every 6 hours as needed (Nausea/Vomiting) (Patient not taking: Reported on 4/10/2019) 30 tablet 3       This note was written with the assistance of the Dragon voice-dictation technology software. The final document, although reviewed, may contain errors. For corrections, please contact the office.

## 2019-04-10 NOTE — LETTER
4/10/2019       RE: Khloe Tamez  [address]  [place] [address]     Dear Colleague,    Thank you for referring your patient, Khloe Tamez, to the [facility]. Please see a copy of my visit note below.      HPI  INITIAL PATIENT ASSESSMENT    Diagnosis: Melanoma of the Anus    Prior radiation therapy: None    Prior chemotherapy: None    Prior hormonal therapy:No    Pain Eval:  Denies    Psychosocial  Living arrangements: Lives with family  Fall Risk: wheelchair/stretcher bound   referral needs: Not needed    Advanced Directive: Yes - Location: On file  Implantable Cardiac Device? No    Nurse face-to-face time: Level 5:  over 15 min face to face time  Review of Systems   Constitutional: Negative for chills, diaphoresis, fever, malaise/fatigue and weight loss.   HENT: Positive for sore throat. Negative for congestion, ear discharge, ear pain, hearing loss, nosebleeds, sinus pain and tinnitus.    Eyes: Positive for blurred vision and double vision. Negative for photophobia, pain, discharge and redness.   Respiratory: Positive for cough. Negative for hemoptysis, sputum production, shortness of breath, wheezing and stridor.    Cardiovascular: Negative for chest pain, palpitations, orthopnea, claudication, leg swelling and PND.   Gastrointestinal: Positive for constipation, nausea and vomiting. Negative for abdominal pain, blood in stool, diarrhea, heartburn and melena.   Genitourinary: Positive for dysuria, frequency and urgency. Negative for flank pain and hematuria.   Musculoskeletal: Positive for back pain, joint pain and neck pain. Negative for myalgias.   Skin: Negative for itching and rash.   Neurological: Positive for dizziness, tremors, speech change, weakness and headaches. Negative for tingling, sensory change, focal weakness, seizures and loss of consciousness.   Endo/Heme/Allergies: Negative for environmental allergies and polydipsia. Does not bruise/bleed easily.    Psychiatric/Behavioral: Positive for hallucinations. Negative for depression, memory loss, substance abuse and suicidal ideas. The patient is nervous/anxious. The patient does not have insomnia.                  RADIATION ONCOLOGY CONSULTATION NOTE  DATE OF ENCOUNTER: April 10, 2019    Khloe Tamez was seen in consultation at the request of Dr. Najera for consideration of palliative radiation for metastatic melanoma to the central nervous system.    HISTORY OF PRESENT ILLNESS:  Khloe Tamez is a 63-year-old woman with metastatic melanoma of the anal canal.  She was initially found to have a perianal lesion in March 2018.  Biopsy was positive for malignant melanoma consistent with pathologic T3a.  Staging PET CT scan showed a perianal FDG avid lesion and an FDG positive left inguinal lymph node.  She underwent wide local excision of a 2 x 2.5 cm deep perianal melanoma. Pathology showed a nodular melanoma, Breslow depth 6.0 mm to mark's level IV with more than 10 mitoses per mm2, with ulceration, and lymphovascular invasion present. No perineural invasion or microsatellitosis. Tumor infiltrating lymphocytes were present -consistent with. AJCC microstage: pT4b.  In April 2018, MRI brain was negative for metastatic disease.  In May 2018, she underwent a left inguinal sentinel lymph node biopsy with 2 of 3 involved lymph nodes without extranodal extension.  She started treatment with adjuvant nivolumab. (FoundationOne testing shows PD-L1 0% by IHC, with microsatellite stability (MARY LOU) and tumor mutational burden low at 4 per Mb DNA. Mutations seen in STK11 V4fs*160, BRAF D594G, SF3B1 R625H and KIT amplification.)  She did well until December 2018 when she experienced blood per rectum.  Anoscopy demonstrated recurrent melanoma.  MRI of the pelvis in January 2019 showed a 4 cm mass involving the rectum and anal verge with abutment of the internal anal sphincter with associated scattered perirectal lymph  nodes suspicious for metastatic disease.  Follow-up PET CT scan showed the 4 cm rectal mass, an FDG avid right inguinal lymph node, a nodular focus of FDG uptake within the gallbladder, and a 5 mm solid nodule in the right lower lobe.  She underwent transanal excision of recurrent anal melanoma on January 25.  Pathology showed an ulcerated melanoma lesion to depth 7 mm without evidence of lymphovascular invasion. The lesion appeared completely resected.  On 2/27/19, she received her first dose of nivolumab plus ipilimumab.  Follow-up brain MRI showed several contrast enhancing lesions in her right cerebral hemisphere.   Follow-up PET-CT showed evidence of disease progression in lung and intra-abdominal implants as well as a new right adrenal gland lesion and intense FDG uptake over the entire span of the spinal cord, suspicious for leptomeningeal disease.  She has also recently developed myoclonic jerks which she associates with recent urinary tract infection although lately they have become worse again.  She also feels that her short-term memory is problematic.  She was recently evaluated by neurology who performed an EEG which demonstrated no electrographic correlate for her myoclonic movements suggesting that they were peripheral in etiology.  They recommended Keppra with follow-up in 1 month.  A paraneoplastic syndrome work-up was also negative.  Given that the symptoms arose following her first dose of nivolumab and ipilimumab, oncology felt that it was possible these enhancing lesions and FDG uptake throughout the spinal course represent pseudo-progression from T cells infiltrating tumor and could explain why she had no evidence of brain mets in her brain MRI on 2/11/19 which was prior to her first cycle of immunotherapy.    Ms. Tamez presents today with her family to discuss possible palliative radiation to the brain and spinal cord for possible leptomeningeal metastasis.  She continues to have myoclonic  jerking intermittently which has not improved with Keppra.  She denies headaches, nausea, vomiting, weakness, numbness.  She is not ambulatory due to the myoclonic jerking and balance difficulties.    PAST MEDICAL HISTORY  1.  Metastatic melanoma, see history of present illness  2.  Osteoarthritis    PAST SURGICAL HISTORY  1.  Status post excision anal melanoma   2.  Status post  section    RADIATION THERAPY HISTORY: None    PAST CHEMOTHERAPY HISTORY: None    ELECTRONIC CARDIAC DEVICE: None    PREGNANCY STATUS: Postmenopausal     ALLERGIES: Bactrim, contrast dye, Bee venom     CURRENT OUTPATIENT MEDICATIONS   Medication     acetaminophen (TYLENOL) 325 MG tablet     calcium carbonate (OS-JANAY 500 MG [place]. [place]) 1250 MG tablet     DIPHENHYDRAMINE HCL PO     levothyroxine (SYNTHROID/LEVOTHROID) 125 MCG tablet     loperamide (IMODIUM) 2 MG capsule     LORazepam (ATIVAN) 0.5 MG tablet     prochlorperazine (COMPAZINE) 10 MG tablet     benzonatate (TESSALON) 100 MG capsule     ciprofloxacin (CIPRO) 500 MG tablet     guaiFENesin (MUCINEX) 600 MG 12 hr tablet     MAGNESIUM PO     oxyCODONE (ROXICODONE) 5 MG tablet     polyethylene glycol (MIRALAX/GLYCOLAX) packet     VITAMIN D, CHOLECALCIFEROL, PO      REVIEW OF SYSTEMS: A 12-point ROS negative except as in history of present illness    SOCIAL HISTORY:  She has worked as a caregiver, currently works at assisted living facility. Lifelong nonsmoker. No significant alcohol. She has twins.     FAMILY HISTORY:  Father had a skin cancer, unsure if melanoma. Paternal aunt had stomach cancer.      ECOG PERFORMANCE STATUS: 3    PHYSICAL EXAMINATION   General: Alert, oriented, in wheelchair, requires much prompting from family members regarding what has just been discussed   Vital signs: /74, pulse 111, respirations 16, weight 79.833 kg, pain 5/10 generalized aches  Mouth/Throat: Oropharynx is clear and moist.   Eyes: EOM are normal. Pupils are equal, round, and  reactive to light. No scleral icterus.   Neck: Normal range of motion. Neck supple.   Cardiovascular: Tachycardic. Regular rhythm. .   Musculoskeletal: Normal range of motion. She exhibits no edema or tenderness.   Neurological: She is alert. CN II - XII intact. 5/5 strength in bilateral upper and lower extremities. Sensation intact to light touch in bilateral upper and lower extremities. Intermittent myoclonic jerks.  Skin: Skin is warm and dry. No rash noted. No pallor.   Psychiatric: She has a normal mood and affect. Thought content normal.      Imaging  MR BRAIN W/O & W CONTRAST 3/28/2019  Impression  1. Subtle punctate foci over the right cerebral hemisphere are suspicious for intracranial metastatic disease. Largest lesion measures up to 4 mm in diameter.  2. Unchanged right frontal calvarial lesion likely represents a venous lake.     MR CERVICAL SPINE W/O & W CONTRAST 3/28/2019   Impression:  1. Multilevel cervical spondylosis most pronounced at C4-C7 with mild neural foraminal stenosis bilaterally. Mild spinal canal stenosis at C4-5.  2. No abnormal enhancement.     PET-CT 3/28/2019  1. Continued increased size of a right lower lobe nodule. This nodule is too small to catheterize with FDG PET.  2. Increased size, number and metabolic activity of metastatic foci within the gallbladder.  3. Multiple new FDG avid presumed intra-abdominal implants.  4. Increased size/metabolic activity of right inguinal lymphadenopathy.  5. New FDG avid right adrenal gland lesion.  6. Intense FDG uptake throughout the entire course of spinal cord, suspicious for leptomeningeal disease.   7. New intense FDG uptake in the pituitary gland could represent hypophysitis  in the setting of immune check point inhibitor usage versus pituitary metastases.     ASSESSMENT AND PLAN:  Khloe Tamez is a 63-year-old woman with metastatic anal melanoma.  She presents today with neurologic symptoms thought to be either from new small  contrast-enhancing lesions in the right cerebral hemisphere on MRI scan or possibly from immunotherapy.  I reviewed her imaging with neuroradiology and there is no clear area of tumor involvement in the spinal cord (cervical spine MRI 3/28/2019).  The areas of enhancement in the recent brain MRI scan are without associated edema and are unlikely to be causing the symptoms she is experiencing, especially as the myoclonic jerking does not appear to be a central etiology from EEG.  I had a long conversation with the patient and her family.  I specifically discussed that palliative radiation would not likely be of benefit in this situation as there is no particular lesion to treat and delivering craniospinal irradiation on the off chance that she has leptomeningeal disease would be of little benefit and cause significant side effects.  Patient and family had many questions which were answered such that they verbalized understanding of her particular situation.  She plans to follow-up with Dr. Valentine to discuss further immunotherapy.  She and her family also understand that her disease progression has a poor prognosis and plan to discuss possible supportive care options with Dr. Valentine when they see him next appointment.    Emani Montero MD   Department of Radiation Oncology  [facility]    CC  Patient Care Team:  Aide Robertson MD as PCP - General  [place], BAR Woodard EVIDIO

## 2019-04-10 NOTE — LETTER
4/10/2019       RE: Khloe Tamez  [address]  [place] [address]     Dear Colleague,    Thank you for referring your patient, Khloe Tamez, to the [facility] NEUROLOGY at [facility]. Please see a copy of my visit note below.      DEPARTMENT OF NEUROLOGY  Patient Name: Khloe Tamez  MRN: [id number]  : 1955  Date of Clinic Visit: April 10, 2019  Primary Care Provider: Aide Robertson    FOLLOW-UP FOR: cognitive decline    INTERVAL HISTORY:  Khloe Tamez is a 63 year old female presenting for follow-up for cognitive decline. I last saw her with her fmaily about 4 weeks ago. We ran a slew of tests including MRI Brain and C-spine, EEG, EMG, CSF, and blood tests. CSF demonstrated elevated protein and reduced glucose, unfortunately do not have cell count and differential results. CSF and blood paraneoplastic panels (PAVAL1 and PAC1) were negative. RT-QUiC was not sent but does not need to be resent. EEG did not have any evidence for seizures or irritated brain areas; it did capture some abnormal limb movements and there was no electrographic correlate. EMG was normal but there were fasciculations observed. Blood vitamin levels were normal. MRI was the most revealing, demonstrating enhancing 4 punctate foci in the cerebral hemispheres that are suspicious for new mets. This would also explain the elevated protein count.   Since last seen, symptoms have been generally stable with perhaps some worsening in the last two days. She's experienced auditory and visual hallucinations over the past 2 days but is aware they're hallucinations. She is not bothered by them. They can occur during the day or evening. Her confusion persists as well, though no more speech troubles like her daughter previously mentioned.   She also hasn't been sleeping very well. She wakes up multiple times throughout the night and has trouble returning to sleep. Then during the day she gets  "fatigued extremely easy and takes a nap for a few hours.   She saw her Oncologist team today. They've been holding her chemotherapy because they were concerned that her symptoms have been getting worse because of it. Family estimates last dose about 6 weeks ago. Since it was stopped, she has not improved and has probably gotten worse.    ASSESSMENT AND PLAN:  63F presenting with family with concerns about confusion. MRI had evidence of brain mets and Iuspect these are causing neuronal dysfunction from focal irritation. With the negative paraneoplastic panel, suspect this elevated protein is secondary to brain mets with blood-brain barrier breakdown.   Low suspicion for intracranial infectoin as she'd be much sicker if it went untreated this long. Her symptoms are unlikely to be caused by chemotherapy as she's had continued symptoms despite no chemo for > 4 weeks. With the EEG lacking electrographic correlate for her abnormal movements, we suspect these are not seizures and perhaps are peripheral in nature. It's possible that some of her cognitive difficulties are related to neuronal irritation from the putative mets, and she might therefore benefit from a medication like Keppra, intended to \"settle down\" the brain. If this does not improve her cognition over the next 4 weeks, ok to stop. Discussed side-effects with her and her family.    Plan:  - Keppra 500mg BID to stabilize neuronal activity  - referral to Dr. Dorsey  - continue chemotherapy per oncology team    Patient was seen and discussed with Dr. Christianson.    This is a supervisory note for Dr Malloy. I have met the patient and confirmed history and pertinent examination findings. I agree with the plan to Keppra and referral to Dr. Dorsey for her expertise in this arena.  Meka Christianson MD [facility]  Department of Neurology  Pager [phone]        PHYSICAL EXAMINATION:  Vitals: /74   Pulse 111   Temp 98.4  F (36.9  C) (Oral)   Resp 16   Ht 1.702 m (5' 7\")   " Wt 79.8 kg (176 lb)   SpO2 96%   BMI 27.57 kg/m     General: sitting in wheelchair, , daughter, and son are present  HEENT: normocephalic  Neuro: quiet, soft-spoken, reduced prosody in voice; no facial asymmetry, conjugate gaze, no gaze deviation, hearing intact to conversation, moderately hypophonia, no dysarthria; no tremor or myoclonus observed, moves all limbs spontaneously, did not formally test strength, reflexes, sensation, cerebellum, or gait.    INVESTIGATIONS:  MRI Brain WOW 3/28/19:  Impression:  1. Subtle punctate foci over the right cerebral hemisphere are suspicious for intracranial metastatic disease. Largest lesion measures up to 4 mm in diameter.  2. Unchanged right frontal calvarial lesion likely represents a venous lake.    MRI Cervical Spine WOW 3/28/19:  Impression:  1. Multilevel cervical spondylosis most pronounced at C4-C7 with mild neural foraminal stenosis bilaterally. Mild spinal canal stenosis at C4-5.  2. No abnormal enhancement.    Lumbar Puncture: opening pressure < [id number]  CSF protein 104  CSF glucose 45    [place] PAC1: negative  [place] PAVAL1: negative    B12 = 685  MMA = 0.24    EEG 4/1/19:  IMPRESSION:  The 3-hour video EEG is abnormal due to the presence of intermittent generalized delta slowing consistent with a mild encephalopathy.  Multiple episodes of whole body jerks or tremors were recorded with no EEG correlation to suggest these are seizures.  No epileptiform discharges or electrographic seizures are seen.  Clinical correlation is advised.     EMG 4/4/19:  Interpretation: The EMG is abnormal. Fasciculation is seen in multiple muscles. This can be an indicator of nerve hyperexcitability. No neuromyotonia or myokymia was seen.      REVIEW OF SYSTEMS: 12-point RoS negative except as per HPI.    MEDICATIONS:  Current Outpatient Medications   Medication Sig Dispense Refill     benzonatate (TESSALON) 100 MG capsule TAKE ONE CAPSULE BY MOUTH TWICE A DAY AS NEEDED FOR COUGH   3     calcium carbonate (OS-JANAY 500 MG [place]. [place]) 1250 MG tablet Take 1 tablet by mouth every evening        ciprofloxacin (CIPRO) 500 MG tablet        DIPHENHYDRAMINE HCL PO Take 25 mg by mouth nightly as needed       guaiFENesin (MUCINEX) 600 MG 12 hr tablet Take 1,200 mg by mouth 2 times daily as needed        levETIRAcetam (KEPPRA) 500 MG tablet Take 1 tablet (500 mg) by mouth 2 times daily 60 tablet 11     levothyroxine (SYNTHROID/LEVOTHROID) 125 MCG tablet Take 1 tablet (125 mcg) by mouth daily 30 tablet 1     loperamide (IMODIUM) 2 MG capsule Take 2 mg by mouth 4 times daily as needed for diarrhea       MAGNESIUM PO Take by mouth At Bedtime       oxyCODONE (ROXICODONE) 5 MG tablet        polyethylene glycol (MIRALAX/GLYCOLAX) packet Take 1 packet by mouth daily       VITAMIN D, CHOLECALCIFEROL, PO Take by mouth every evening        LORazepam (ATIVAN) 0.5 MG tablet Take 1 tablet (0.5 mg) by mouth every 4 hours as needed (Anxiety, Nausea/Vomiting or Sleep) (Patient not taking: Reported on 4/10/2019) 30 tablet 3     prochlorperazine (COMPAZINE) 10 MG tablet Take 1 tablet (10 mg) by mouth every 6 hours as needed (Nausea/Vomiting) (Patient not taking: Reported on 4/10/2019) 30 tablet 3       This note was written with the assistance of the Dragon voice-dictation technology software. The final document, although reviewed, may contain errors. For corrections, please contact the office.    Again, thank you for allowing me to participate in the care of your patient.      Sincerely,    Javy Malloy MD

## 2019-04-10 NOTE — PROGRESS NOTES
HPI  INITIAL PATIENT ASSESSMENT    Diagnosis: Melanoma of the Anus    Prior radiation therapy: None    Prior chemotherapy: None    Prior hormonal therapy:No    Pain Eval:  Denies    Psychosocial  Living arrangements: Lives with family  Fall Risk: wheelchair/stretcher bound   referral needs: Not needed    Advanced Directive: Yes - Location: On file  Implantable Cardiac Device? No    Nurse face-to-face time: Level 5:  over 15 min face to face time  Review of Systems   Constitutional: Negative for chills, diaphoresis, fever, malaise/fatigue and weight loss.   HENT: Positive for sore throat. Negative for congestion, ear discharge, ear pain, hearing loss, nosebleeds, sinus pain and tinnitus.    Eyes: Positive for blurred vision and double vision. Negative for photophobia, pain, discharge and redness.   Respiratory: Positive for cough. Negative for hemoptysis, sputum production, shortness of breath, wheezing and stridor.    Cardiovascular: Negative for chest pain, palpitations, orthopnea, claudication, leg swelling and PND.   Gastrointestinal: Positive for constipation, nausea and vomiting. Negative for abdominal pain, blood in stool, diarrhea, heartburn and melena.   Genitourinary: Positive for dysuria, frequency and urgency. Negative for flank pain and hematuria.   Musculoskeletal: Positive for back pain, joint pain and neck pain. Negative for myalgias.   Skin: Negative for itching and rash.   Neurological: Positive for dizziness, tremors, speech change, weakness and headaches. Negative for tingling, sensory change, focal weakness, seizures and loss of consciousness.   Endo/Heme/Allergies: Negative for environmental allergies and polydipsia. Does not bruise/bleed easily.   Psychiatric/Behavioral: Positive for hallucinations. Negative for depression, memory loss, substance abuse and suicidal ideas. The patient is nervous/anxious. The patient does not have insomnia.

## 2019-04-10 NOTE — TELEPHONE ENCOUNTER
ONCOLOGY INTAKE: Records Information      APPT INFORMATION: 4/19/19 at 10:30AM  Referring provider:  Dr. Javy Malloy  Referring provider s clinic:  [facility]  Reason for visit/diagnosis:  Melanoma with Mets to Brain    RECORDS INFORMATION:  Were the records received with the referral (via [employer])? No    Has patient been seen for any external appt for this diagnosis? No    Has patient had any imaging or procedures outside of [facility] for this condition? No    ADDITIONAL INFORMATION:  Per [place] at clinic, pt has not been seen or treated for this outside of [facility].

## 2019-04-18 ENCOUNTER — CARE COORDINATION (OUTPATIENT)
Dept: ONCOLOGY | Facility: CLINIC | Age: 64
End: 2019-04-18

## 2019-04-19 ENCOUNTER — PRE VISIT (OUTPATIENT)
Dept: ONCOLOGY | Facility: CLINIC | Age: 64
End: 2019-04-19

## 2019-04-29 NOTE — PROGRESS NOTES
RADIATION ONCOLOGY CONSULTATION NOTE  DATE OF ENCOUNTER: April 10, 2019    Khloe Tamez was seen in consultation at the request of Dr. Najera for consideration of palliative radiation for metastatic melanoma to the central nervous system.    HISTORY OF PRESENT ILLNESS:  Khloe Tamez is a 63-year-old woman with metastatic melanoma of the anal canal.  She was initially found to have a perianal lesion in March 2018.  Biopsy was positive for malignant melanoma consistent with pathologic T3a.  Staging PET CT scan showed a perianal FDG avid lesion and an FDG positive left inguinal lymph node.  She underwent wide local excision of a 2 x 2.5 cm deep perianal melanoma. Pathology showed a nodular melanoma, Breslow depth 6.0 mm to mark's level IV with more than 10 mitoses per mm2, with ulceration, and lymphovascular invasion present. No perineural invasion or microsatellitosis. Tumor infiltrating lymphocytes were present -consistent with. AJCC microstage: pT4b.  In April 2018, MRI brain was negative for metastatic disease.  In May 2018, she underwent a left inguinal sentinel lymph node biopsy with 2 of 3 involved lymph nodes without extranodal extension.  She started treatment with adjuvant nivolumab. ([employer] testing shows PD-L1 0% by IHC, with microsatellite stability (MARY LOU) and tumor mutational burden low at 4 per Mb DNA. Mutations seen in STK11 V4fs*160, BRAF D594G, SF3B1 R625H and KIT amplification.)  She did well until December 2018 when she experienced blood per rectum.  Anoscopy demonstrated recurrent melanoma.  MRI of the pelvis in January 2019 showed a 4 cm mass involving the rectum and anal verge with abutment of the internal anal sphincter with associated scattered perirectal lymph nodes suspicious for metastatic disease.  Follow-up PET CT scan showed the 4 cm rectal mass, an FDG avid right inguinal lymph node, a nodular focus of FDG uptake within the gallbladder, and a 5 mm solid nodule in the  right lower lobe.  She underwent transanal excision of recurrent anal melanoma on January 25.  Pathology showed an ulcerated melanoma lesion to depth 7 mm without evidence of lymphovascular invasion. The lesion appeared completely resected.  On 2/27/19, she received her first dose of nivolumab plus ipilimumab.  Follow-up brain MRI showed several contrast enhancing lesions in her right cerebral hemisphere.   Follow-up PET-CT showed evidence of disease progression in lung and intra-abdominal implants as well as a new right adrenal gland lesion and intense FDG uptake over the entire span of the spinal cord, suspicious for leptomeningeal disease.  She has also recently developed myoclonic jerks which she associates with recent urinary tract infection although lately they have become worse again.  She also feels that her short-term memory is problematic.  She was recently evaluated by neurology who performed an EEG which demonstrated no electrographic correlate for her myoclonic movements suggesting that they were peripheral in etiology.  They recommended Keppra with follow-up in 1 month.  A paraneoplastic syndrome work-up was also negative.  Given that the symptoms arose following her first dose of nivolumab and ipilimumab, oncology felt that it was possible these enhancing lesions and FDG uptake throughout the spinal course represent pseudo-progression from T cells infiltrating tumor and could explain why she had no evidence of brain mets in her brain MRI on 2/11/19 which was prior to her first cycle of immunotherapy.    Ms. Tamez presents today with her family to discuss possible palliative radiation to the brain and spinal cord for possible leptomeningeal metastasis.  She continues to have myoclonic jerking intermittently which has not improved with Keppra.  She denies headaches, nausea, vomiting, weakness, numbness.  She is not ambulatory due to the myoclonic jerking and balance difficulties.    PAST MEDICAL  HISTORY  1.  Metastatic melanoma, see history of present illness  2.  Osteoarthritis    PAST SURGICAL HISTORY  1.  Status post excision anal melanoma   2.  Status post  section    RADIATION THERAPY HISTORY: None    PAST CHEMOTHERAPY HISTORY: None    ELECTRONIC CARDIAC DEVICE: None    PREGNANCY STATUS: Postmenopausal     ALLERGIES: Bactrim, contrast dye, Bee venom     CURRENT OUTPATIENT MEDICATIONS   Medication     acetaminophen (TYLENOL) 325 MG tablet     calcium carbonate (OS-JANAY 500 MG [place]. [place]) 1250 MG tablet     DIPHENHYDRAMINE HCL PO     levothyroxine (SYNTHROID/LEVOTHROID) 125 MCG tablet     loperamide (IMODIUM) 2 MG capsule     LORazepam (ATIVAN) 0.5 MG tablet     prochlorperazine (COMPAZINE) 10 MG tablet     benzonatate (TESSALON) 100 MG capsule     ciprofloxacin (CIPRO) 500 MG tablet     guaiFENesin (MUCINEX) 600 MG 12 hr tablet     MAGNESIUM PO     oxyCODONE (ROXICODONE) 5 MG tablet     polyethylene glycol (MIRALAX/GLYCOLAX) packet     VITAMIN D, CHOLECALCIFEROL, PO      REVIEW OF SYSTEMS: A 12-point ROS negative except as in history of present illness    SOCIAL HISTORY:  She has worked as a caregiver, currently works at assisted living facility. Lifelong nonsmoker. No significant alcohol. She has twins.     FAMILY HISTORY:  Father had a skin cancer, unsure if melanoma. Paternal aunt had stomach cancer.      ECOG PERFORMANCE STATUS: 3    PHYSICAL EXAMINATION   General: Alert, oriented, in wheelchair, requires much prompting from family members regarding what has just been discussed   Vital signs: /74, pulse 111, respirations 16, weight 79.833 kg, pain 5/10 generalized aches  Mouth/Throat: Oropharynx is clear and moist.   Eyes: EOM are normal. Pupils are equal, round, and reactive to light. No scleral icterus.   Neck: Normal range of motion. Neck supple.   Cardiovascular: Tachycardic. Regular rhythm. .   Musculoskeletal: Normal range of motion. She exhibits no edema or tenderness.    Neurological: She is alert. CN II - XII intact. 5/5 strength in bilateral upper and lower extremities. Sensation intact to light touch in bilateral upper and lower extremities. Intermittent myoclonic jerks.  Skin: Skin is warm and dry. No rash noted. No pallor.   Psychiatric: She has a normal mood and affect. Thought content normal.      Imaging  MR BRAIN W/O & W CONTRAST 3/28/2019  Impression  1. Subtle punctate foci over the right cerebral hemisphere are suspicious for intracranial metastatic disease. Largest lesion measures up to 4 mm in diameter.  2. Unchanged right frontal calvarial lesion likely represents a venous lake.     MR CERVICAL SPINE W/O & W CONTRAST 3/28/2019   Impression:  1. Multilevel cervical spondylosis most pronounced at C4-C7 with mild neural foraminal stenosis bilaterally. Mild spinal canal stenosis at C4-5.  2. No abnormal enhancement.     PET-CT 3/28/2019  1. Continued increased size of a right lower lobe nodule. This nodule is too small to catheterize with FDG PET.  2. Increased size, number and metabolic activity of metastatic foci within the gallbladder.  3. Multiple new FDG avid presumed intra-abdominal implants.  4. Increased size/metabolic activity of right inguinal lymphadenopathy.  5. New FDG avid right adrenal gland lesion.  6. Intense FDG uptake throughout the entire course of spinal cord, suspicious for leptomeningeal disease.   7. New intense FDG uptake in the pituitary gland could represent hypophysitis  in the setting of immune check point inhibitor usage versus pituitary metastases.     ASSESSMENT AND PLAN:  Khloe Tamez is a 63-year-old woman with metastatic anal melanoma.  She presents today with neurologic symptoms thought to be either from new small contrast-enhancing lesions in the right cerebral hemisphere on MRI scan or possibly from immunotherapy.  I reviewed her imaging with neuroradiology and there is no clear area of tumor involvement in the spinal cord  (cervical spine MRI 3/28/2019).  The areas of enhancement in the recent brain MRI scan are without associated edema and are unlikely to be causing the symptoms she is experiencing, especially as the myoclonic jerking does not appear to be a central etiology from EEG.  I had a long conversation with the patient and her family.  I specifically discussed that palliative radiation would not likely be of benefit in this situation as there is no particular lesion to treat and delivering craniospinal irradiation on the off chance that she has leptomeningeal disease would be of little benefit and cause significant side effects.  Patient and family had many questions which were answered such that they verbalized understanding of her particular situation.  She plans to follow-up with Dr. Valentine to discuss further immunotherapy.  She and her family also understand that her disease progression has a poor prognosis and plan to discuss possible supportive care options with Dr. Valentine when they see him next appointment.    Emani Montero MD   Department of Radiation Oncology  [facility]    CC  Patient Care Team:  Aide Robertson MD as PCP - Meka Camara PA-C DOMINGO MUSIBAY, JEREMY

## 2019-11-30 NOTE — MR AVS SNAPSHOT
After Visit Summary   11/15/2018    Khloe Tamez    MRN: [id number]           Patient Information     Date Of Birth          1955        Visit Information        Provider Department      11/15/2018 1:30 PM JORDYN 22 ATC;  ONCOLOGY INFUSION [facility]        Today's Diagnoses     Melanoma of anus (H)    -  1    Metastatic malignant melanoma (H)        Hyponatremia        Fatigue, unspecified type          [facility] [place] [facility]: [phone]    Call triage nurse with chills and/or temperature greater than or equal to 100.4, uncontrolled nausea/vomiting, diarrhea, constipation, dizziness, shortness of breath, chest pain, bleeding, unexplained bruising, or any new/concerning symptoms, questions/concerns.   If you are having any concerning symptoms or wish to speak to a provider before your next infusion visit, please call your care coordinator or triage to notify them so we can adequately serve you.   Triage Nurse Line: [phone]    If after hours, weekends, or holidays [phone]               November 2018 Sunday Monday Tuesday Wednesday Thursday Friday Saturday                       1     2     3       4     5     6     7     8     9     10       11     12     13     14     15     CT CHEST ABDOMEN PELVIS WWO    9:20 AM   (20 min.)   UCCT1   [facility] CT     [facility] DRAW    9:30 AM   (15 min.)   [facility] LAB DRAW   [facility] Lab Draw     [facility] RETURN   11:55 AM   (50 min.)   Racheal Pelayo PA   [facility] ONC INFUSION 60    1:30 PM   (60 min.)    ONCOLOGY INFUSION   [facility] 16     17       18     19     20     21     22     23     24       25     26     27     28     29     30 December 2018 Sunday Monday Tuesday Wednesday Thursday Friday Saturday                                 1       2     3     4     5     6     7     8        9     10     11     12     13     [facility] LAB DRAW    1:30 PM   (15 min.)   [facility] LAB DRAW   [facility] Lab Draw     [facility] ONC INFUSION 60    2:00 PM   (60 min.)    ONCOLOGY INFUSION   [facility] 14     15       16     17     18     19     20     21     22       23     24     25     26     27     28     29  Happy Birthday!       30     31                                           Lab Results:  Recent Results (from the past 12 hour(s))   Comprehensive metabolic panel    Collection Time: 11/15/18 10:05 AM   Result Value Ref Range    Sodium 132 (L) 133 - 144 mmol/L    Potassium 4.2 3.4 - 5.3 mmol/L    Chloride 102 94 - 109 mmol/L    Carbon Dioxide 23 20 - 32 mmol/L    Anion Gap 7 3 - 14 mmol/L    Glucose 93 70 - 99 mg/dL    Urea Nitrogen 19 7 - 30 mg/dL    Creatinine 0.94 0.52 - 1.04 mg/dL    GFR Estimate 61 >60 mL/min/1.7m2    GFR Estimate If Black 74 >60 mL/min/1.7m2    Calcium 8.2 (L) 8.5 - 10.1 mg/dL    Bilirubin Total 0.4 0.2 - 1.3 mg/dL    Albumin 3.3 (L) 3.4 - 5.0 g/dL    Protein Total 6.9 6.8 - 8.8 g/dL    Alkaline Phosphatase 87 40 - 150 U/L    ALT 20 0 - 50 U/L    AST 13 0 - 45 U/L   TSH with free T4 reflex    Collection Time: 11/15/18 10:05 AM   Result Value Ref Range    TSH 24.67 (H) 0.40 - 4.00 mU/L   CBC with platelets differential    Collection Time: 11/15/18 10:05 AM   Result Value Ref Range    WBC 7.7 4.0 - 11.0 10e9/L    RBC Count 4.44 3.8 - 5.2 10e12/L    Hemoglobin 13.2 11.7 - 15.7 g/dL    Hematocrit 40.3 35.0 - 47.0 %    MCV 91 78 - 100 fl    MCH 29.7 26.5 - 33.0 pg    MCHC 32.8 31.5 - 36.5 g/dL    RDW 13.7 10.0 - 15.0 %    Platelet Count 299 150 - 450 10e9/L    Diff Method Automated Method     % Neutrophils 71.9 %    % Lymphocytes 17.5 %    % Monocytes 6.2 %    % Eosinophils 3.2 %    % Basophils 0.8 %    % Immature Granulocytes 0.4 %    Nucleated RBCs 0 0 /100    Absolute Neutrophil 5.5 1.6 - 8.3 10e9/L    Absolute Lymphocytes 1.4 0.8 - 5.3 10e9/L    Absolute  Monocytes 0.5 0.0 - 1.3 10e9/L    Absolute Eosinophils 0.3 0.0 - 0.7 10e9/L    Absolute Basophils 0.1 0.0 - 0.2 10e9/L    Abs Immature Granulocytes 0.0 0 - 0.4 10e9/L    Absolute Nucleated RBC 0.0    T4 free    Collection Time: 11/15/18 10:05 AM   Result Value Ref Range    T4 Free 1.09 0.76 - 1.46 ng/dL             Follow-ups after your visit        Your next 10 appointments already scheduled     Dec 13, 2018  1:30 PM CST   [facility] Draw with [facility] DRAW   [facility] Lab Draw ([facility])    [address] [place] [address]  [address]  [facility] [address] [phone]            Dec 13, 2018  2:00 PM CST   Infusion 60 with UC ONCOLOGY INFUSION, UC 15 ATC   [facility] ([facility])    [address] [place] [address]  [address]  [facility] [address] [phone]              Who to contact     If you have questions or need follow up information about today's clinic visit or your schedule please contact [facility] directly at [phone].  Normal or non-critical lab and imaging results will be communicated to you by "[employer]"hart, letter or phone within 4 business days after the clinic has received the results. If you do not hear from us within 7 days, please contact the clinic through "[employer]"hart or phone. If you have a critical or abnormal lab result, we will notify you by phone as soon as possible.  Submit refill requests through [employer] or call your pharmacy and they will forward the refill request to us. Please allow 3 business days for your refill to be completed.          Additional Information About Your Visit        "[employer]"hart Information     [employer] gives you secure access to your electronic health record. If you see a primary care provider, you can also send messages to your care team and make appointments. If you have questions, please call your primary care clinic.  If you do not have a primary care provider, please call  [facility] [phone] and they will assist you.        Care EveryWhere ID     This is your Care EveryWhere ID. This could be used by other organizations to access your [facility] medical records  [id number]         Blood Pressure from Last 3 Encounters:   11/15/18 135/85   10/18/18 140/90   09/20/18 148/83    Weight from Last 3 Encounters:   11/15/18 93.9 kg (207 lb)   10/18/18 93.2 kg (205 lb 6.4 oz)   09/20/18 92.9 kg (204 lb 11.2 oz)              We Performed the Following     CBC with platelets differential     Comprehensive metabolic panel     Cortisol     Osmolality     T4 free     T4 free     TSH with free T4 reflex          Where to get your medicines      Some of these will need a paper prescription and others can be bought over the counter.  Ask your nurse if you have questions.     Bring a paper prescription for each of these medications     LORazepam 0.5 MG tablet          Primary Care Provider Office Phone # Fax #    Aide Robertson [phone]       [place] [address]        Equal Access to Services     [facility]: Hadii katelyn Cox, waaxda luqlayo, qaybta kaalbarby lraa, silvia guzmán. So [facility] [phone].    ATENCIÓN: Si habla español, tiene a tim disposición servicios gratuitos de asistencia lingüística. [facility] [phone].    We comply with applicable federal civil rights laws and [place] laws. We do not discriminate on the basis of race, color, national origin, age, disability, sex, sexual orientation, or gender identity.            Thank you!     Thank you for choosing [facility]  for your care. Our goal is always to provide you with excellent care. Hearing back from our patients is one way we can continue to improve our services. Please take a few minutes to complete the written survey that you may receive in the mail after your visit with us. Thank you!             Your  Updated Medication List - Protect others around you: Learn how to safely use, store and throw away your medicines at www.disposemymeds.org.          This list is accurate as of 11/15/18  3:27 PM.  Always use your most recent med list.                   Brand Name Dispense Instructions for use Diagnosis    benzonatate 100 MG capsule    TESSALON    60 capsule    Take 1 capsule (100 mg) by mouth 2 times daily as needed for cough    Melanoma of anus (H)       calcium carbonate 500 mg (elemental) 500 MG tablet    OS-JANAY     Take 1 tablet by mouth every evening        DIPHENHYDRAMINE HCL PO      Take 25 mg by mouth nightly as needed        FLUZONE QUADRIVALENT injection   Generic drug:  influenza quadrivalent (w/PRESERVATIVE) vacc age 3 yrs and older       Melanoma of anus (H)       guaiFENesin 600 MG 12 hr tablet    MUCINEX     Take 1,200 mg by mouth 2 times daily        levothyroxine 100 MCG tablet    SYNTHROID/LEVOTHROID    30 tablet    Take 1 tablet (100 mcg) by mouth daily    Drug-induced hypothyroidism       LORazepam 0.5 MG tablet    ATIVAN    30 tablet    Take 1 tablet (0.5 mg) by mouth every 4 hours as needed (Anxiety, Nausea/Vomiting or Sleep)    Melanoma of anus (H)       MAGNESIUM PO      Take by mouth At Bedtime        METAMUCIL PO      Take 1 packet by mouth        prochlorperazine 10 MG tablet    COMPAZINE    30 tablet    Take 1 tablet (10 mg) by mouth every 6 hours as needed (Nausea/Vomiting)    Melanoma of anus (H)       TYLENOL ARTHRITIS PAIN 650 MG CR tablet   Generic drug:  acetaminophen      Take 650 mg by mouth every 6 hours as needed for mild pain or fever        VITAMIN D (CHOLECALCIFEROL) PO      Take by mouth every evening

## 2024-12-26 NOTE — OR NURSING
Patient came into pre-op early. She stated that she was unable to complete two Fleets Enemas at home secondary to inability to access anus. Dr. Wright consented patient and stated to administer two Fleets Enemas in pre-op area. This RN and patient completed two enemas without issue. Soft stool expelled with both enemas. Patient denied cramping/pain, and/or weakness.   
Report to Damari GASPAR at this time.  
Text paged Dr. Wright to inquire about enema result desired outcomes.       1412: Dr. Wright returned page. No further interventions at this time.  
99

## (undated) DEVICE — GOWN IMPERVIOUS BREATHABLE SMART XLG 89045

## (undated) DEVICE — PACK RECTAL UMMC

## (undated) DEVICE — SUCTION MANIFOLD NEPTUNE 2 SYS 1 PORT 702-025-000

## (undated) DEVICE — SU SILK 3-0 SH 30" K832H

## (undated) DEVICE — DRSG TELFA 3X8" 1238

## (undated) DEVICE — BNDG COBAN 2"X5YDS CO-FLEX UNSTERILE ASSRTD CLRS LF 5200CP

## (undated) DEVICE — GLOVE PROTEXIS POWDER FREE SMT 8.0  2D72PT80X

## (undated) DEVICE — ANOSCOPE PLASTIC CLEAR UNSTERILE 82420

## (undated) DEVICE — SPECIMEN CONTAINER W/10% BUFFERED FORMALIN 120ML 591201

## (undated) DEVICE — ESU GROUND PAD ADULT W/CORD E7507

## (undated) DEVICE — LINEN TOWEL PACK X5 5464

## (undated) DEVICE — PACK MINOR CUSTOM ASC

## (undated) DEVICE — SU VICRYL 2-0 TIE 12X18" J905T

## (undated) DEVICE — SU VICRYL 2-0 SH 27" UND J417H

## (undated) DEVICE — GLOVE PROTEXIS MICRO 7.0  2D73PM70

## (undated) DEVICE — SU SILK 2-0 TIE 12X30" A305H

## (undated) DEVICE — RETR RING LONE STAR 14.1X14.1CM 3307G

## (undated) DEVICE — SU VICRYL 3-0 SH 27" J316H

## (undated) DEVICE — SOL WATER IRRIG 500ML BOTTLE 2F7113

## (undated) DEVICE — DRSG GAUZE 4X4" TRAY 6939

## (undated) DEVICE — TAPE DURAPORE 3" SILK 1538-3

## (undated) DEVICE — SU PDS II 4-0 FS-2 27" Z422H

## (undated) DEVICE — GLOVE PROTEXIS BLUE W/NEU-THERA 7.5  2D73EB75

## (undated) DEVICE — DRAPE IOBAN INCISE 23X17" 6650EZ

## (undated) DEVICE — STRAP KNEE/BODY 31143004

## (undated) DEVICE — SYR 10ML FINGER CONTROL W/O NDL 309695

## (undated) DEVICE — SU VICRYL 3-0 SH 27" UND J416H

## (undated) DEVICE — TAPE CLOTH 3" CARDINAL 3TRCL03

## (undated) DEVICE — SU DERMABOND ADVANCED .7ML DNX12

## (undated) DEVICE — PAD CHUX UNDERPAD 30X30"

## (undated) DEVICE — PREP TECHNI-CARE CHLOROXYLENOL 3% 4OZ BOTTLE C222-4ZWO

## (undated) DEVICE — NDL 25GA 2"  8881200441

## (undated) DEVICE — PREP PAD ALCOHOL 6818

## (undated) DEVICE — SOL WATER IRRIG 1000ML BOTTLE 2F7114

## (undated) DEVICE — PREP CHLORAPREP 26ML TINTED ORANGE  260815

## (undated) DEVICE — DRAPE GYN/UROLOGY FLUID POUCH TUR 29455

## (undated) DEVICE — ESU ELEC BLADE 6" COATED E1450-6

## (undated) DEVICE — DRAPE LAP TRANSVERSE 29421

## (undated) DEVICE — DRAPE LAP NEONATAL 72X113.5"

## (undated) DEVICE — NDL 25GA 5/8" 305122

## (undated) DEVICE — TAPE DURAPORE 2"X1.5YD SILK 1538S-2

## (undated) DEVICE — NDL BLUNT 18GA 1" W/O FILTER 305181

## (undated) DEVICE — SWAB PROCTO OR

## (undated) DEVICE — RETR ELASTIC STAYS LONE STAR SHARP 5MM 8/PACK 3311-8G

## (undated) DEVICE — TUBING SMOKE EVAC 2.2CMX3M SEA3715

## (undated) DEVICE — SOL NACL 0.9% IRRIG 500ML BOTTLE 2F7123

## (undated) DEVICE — SU SILK 3-0 TIE 12X30" A304H

## (undated) DEVICE — ESU ELEC BLADE 2.75" COATED/INSULATED E1455

## (undated) DEVICE — MARKER MARGIN MARKER STD 6 COLOR SGL USE MMS6

## (undated) DEVICE — CLIP HORIZON SM RED WIDE SLOT 001201

## (undated) DEVICE — PACK RECTAL KIT CUSTOM ASC

## (undated) DEVICE — CLIP HORIZON MED BLUE 002200

## (undated) RX ORDER — PROPOFOL 10 MG/ML
INJECTION, EMULSION INTRAVENOUS
Status: DISPENSED
Start: 2018-04-13

## (undated) RX ORDER — PROPOFOL 10 MG/ML
INJECTION, EMULSION INTRAVENOUS
Status: DISPENSED
Start: 2018-01-01

## (undated) RX ORDER — ONDANSETRON 2 MG/ML
INJECTION INTRAMUSCULAR; INTRAVENOUS
Status: DISPENSED
Start: 2019-01-01

## (undated) RX ORDER — ISOSULFAN BLUE 50 MG/5ML
INJECTION, SOLUTION SUBCUTANEOUS
Status: DISPENSED
Start: 2018-01-01

## (undated) RX ORDER — GABAPENTIN 300 MG/1
CAPSULE ORAL
Status: DISPENSED
Start: 2018-01-01

## (undated) RX ORDER — DEXAMETHASONE SODIUM PHOSPHATE 4 MG/ML
INJECTION, SOLUTION INTRA-ARTICULAR; INTRALESIONAL; INTRAMUSCULAR; INTRAVENOUS; SOFT TISSUE
Status: DISPENSED
Start: 2018-04-13

## (undated) RX ORDER — LIDOCAINE HYDROCHLORIDE 10 MG/ML
INJECTION, SOLUTION EPIDURAL; INFILTRATION; INTRACAUDAL; PERINEURAL
Status: DISPENSED
Start: 2018-04-13

## (undated) RX ORDER — ACETAMINOPHEN 325 MG/1
TABLET ORAL
Status: DISPENSED
Start: 2018-01-01

## (undated) RX ORDER — BUPIVACAINE HYDROCHLORIDE AND EPINEPHRINE 5; 5 MG/ML; UG/ML
INJECTION, SOLUTION EPIDURAL; INTRACAUDAL; PERINEURAL
Status: DISPENSED
Start: 2019-01-01

## (undated) RX ORDER — FENTANYL CITRATE 50 UG/ML
INJECTION, SOLUTION INTRAMUSCULAR; INTRAVENOUS
Status: DISPENSED
Start: 2019-01-01

## (undated) RX ORDER — FERRIC SUBSULFATE 0.21 G/G
LIQUID TOPICAL
Status: DISPENSED
Start: 2019-01-01

## (undated) RX ORDER — LIDOCAINE HYDROCHLORIDE 10 MG/ML
INJECTION, SOLUTION EPIDURAL; INFILTRATION; INTRACAUDAL; PERINEURAL
Status: DISPENSED
Start: 2018-01-01

## (undated) RX ORDER — DEXAMETHASONE SODIUM PHOSPHATE 4 MG/ML
INJECTION, SOLUTION INTRA-ARTICULAR; INTRALESIONAL; INTRAMUSCULAR; INTRAVENOUS; SOFT TISSUE
Status: DISPENSED
Start: 2019-01-01

## (undated) RX ORDER — PHENYLEPHRINE HCL IN 0.9% NACL 1 MG/10 ML
SYRINGE (ML) INTRAVENOUS
Status: DISPENSED
Start: 2018-01-01

## (undated) RX ORDER — FENTANYL CITRATE 50 UG/ML
INJECTION, SOLUTION INTRAMUSCULAR; INTRAVENOUS
Status: DISPENSED
Start: 2018-04-13

## (undated) RX ORDER — OXYCODONE HYDROCHLORIDE 5 MG/1
TABLET ORAL
Status: DISPENSED
Start: 2019-01-01

## (undated) RX ORDER — ACETIC ACID 5 %
LIQUID (ML) MISCELLANEOUS
Status: DISPENSED
Start: 2018-04-13

## (undated) RX ORDER — ACETAMINOPHEN 325 MG/1
TABLET ORAL
Status: DISPENSED
Start: 2018-04-13

## (undated) RX ORDER — CEFOTETAN DISODIUM 2 G/20ML
INJECTION, POWDER, FOR SOLUTION INTRAMUSCULAR; INTRAVENOUS
Status: DISPENSED
Start: 2018-04-13

## (undated) RX ORDER — BUPIVACAINE HYDROCHLORIDE 2.5 MG/ML
INJECTION, SOLUTION EPIDURAL; INFILTRATION; INTRACAUDAL
Status: DISPENSED
Start: 2018-01-01

## (undated) RX ORDER — PHENYLEPHRINE HCL IN 0.9% NACL 1 MG/10 ML
SYRINGE (ML) INTRAVENOUS
Status: DISPENSED
Start: 2018-04-13

## (undated) RX ORDER — GLYCOPYRROLATE 0.2 MG/ML
INJECTION, SOLUTION INTRAMUSCULAR; INTRAVENOUS
Status: DISPENSED
Start: 2019-01-01

## (undated) RX ORDER — BUPIVACAINE HYDROCHLORIDE 2.5 MG/ML
INJECTION, SOLUTION EPIDURAL; INFILTRATION; INTRACAUDAL
Status: DISPENSED
Start: 2018-04-13

## (undated) RX ORDER — LIDOCAINE HYDROCHLORIDE 10 MG/ML
INJECTION, SOLUTION EPIDURAL; INFILTRATION; INTRACAUDAL; PERINEURAL
Status: DISPENSED
Start: 2019-01-01

## (undated) RX ORDER — FENTANYL CITRATE 50 UG/ML
INJECTION, SOLUTION INTRAMUSCULAR; INTRAVENOUS
Status: DISPENSED
Start: 2018-01-01

## (undated) RX ORDER — ACETAMINOPHEN 325 MG/1
TABLET ORAL
Status: DISPENSED
Start: 2019-01-01

## (undated) RX ORDER — IBUPROFEN 600 MG/1
TABLET, FILM COATED ORAL
Status: DISPENSED
Start: 2019-01-01

## (undated) RX ORDER — ONDANSETRON 2 MG/ML
INJECTION INTRAMUSCULAR; INTRAVENOUS
Status: DISPENSED
Start: 2018-04-13

## (undated) RX ORDER — GABAPENTIN 300 MG/1
CAPSULE ORAL
Status: DISPENSED
Start: 2018-04-13